# Patient Record
Sex: FEMALE | Race: WHITE | Employment: FULL TIME | URBAN - METROPOLITAN AREA
[De-identification: names, ages, dates, MRNs, and addresses within clinical notes are randomized per-mention and may not be internally consistent; named-entity substitution may affect disease eponyms.]

---

## 2017-01-03 RX ORDER — METOPROLOL SUCCINATE 25 MG/1
TABLET, EXTENDED RELEASE ORAL
Qty: 90 TABLET | Refills: 3 | Status: SHIPPED | OUTPATIENT
Start: 2017-01-03 | End: 2017-12-29 | Stop reason: SDUPTHER

## 2017-01-26 ENCOUNTER — ANTI-COAG VISIT (OUTPATIENT)
Dept: PHARMACY | Facility: CLINIC | Age: 57
End: 2017-01-26

## 2017-01-26 LAB — INR BLD: 3

## 2017-02-10 ENCOUNTER — OFFICE VISIT (OUTPATIENT)
Dept: FAMILY MEDICINE CLINIC | Age: 57
End: 2017-02-10

## 2017-02-10 VITALS
SYSTOLIC BLOOD PRESSURE: 118 MMHG | BODY MASS INDEX: 34.15 KG/M2 | TEMPERATURE: 98.3 F | WEIGHT: 200 LBS | DIASTOLIC BLOOD PRESSURE: 60 MMHG | HEIGHT: 64 IN | HEART RATE: 62 BPM | RESPIRATION RATE: 16 BRPM

## 2017-02-10 DIAGNOSIS — J06.9 VIRAL UPPER RESPIRATORY TRACT INFECTION: ICD-10-CM

## 2017-02-10 DIAGNOSIS — H10.31 ACUTE BACTERIAL CONJUNCTIVITIS OF RIGHT EYE: Primary | ICD-10-CM

## 2017-02-10 PROCEDURE — 99214 OFFICE O/P EST MOD 30 MIN: CPT | Performed by: FAMILY MEDICINE

## 2017-02-10 RX ORDER — SULFACETAMIDE SODIUM 100 MG/ML
1 SOLUTION/ DROPS OPHTHALMIC 4 TIMES DAILY
Qty: 1 BOTTLE | Refills: 0 | Status: SHIPPED | OUTPATIENT
Start: 2017-02-10 | End: 2017-02-15

## 2017-02-23 ENCOUNTER — ANTI-COAG VISIT (OUTPATIENT)
Dept: PHARMACY | Facility: CLINIC | Age: 57
End: 2017-02-23

## 2017-02-23 LAB — INR BLD: 3.6

## 2017-03-02 ENCOUNTER — OFFICE VISIT (OUTPATIENT)
Dept: FAMILY MEDICINE CLINIC | Age: 57
End: 2017-03-02

## 2017-03-02 VITALS
WEIGHT: 202 LBS | BODY MASS INDEX: 34.49 KG/M2 | HEART RATE: 72 BPM | HEIGHT: 64 IN | DIASTOLIC BLOOD PRESSURE: 70 MMHG | TEMPERATURE: 98.1 F | RESPIRATION RATE: 18 BRPM | SYSTOLIC BLOOD PRESSURE: 120 MMHG

## 2017-03-02 DIAGNOSIS — E78.5 HYPERLIPIDEMIA WITH TARGET LDL LESS THAN 130: Primary | ICD-10-CM

## 2017-03-02 DIAGNOSIS — Z79.01 CHRONIC ANTICOAGULATION: ICD-10-CM

## 2017-03-02 DIAGNOSIS — R73.9 HYPERGLYCEMIA: ICD-10-CM

## 2017-03-02 DIAGNOSIS — Z95.2 S/P AVR (AORTIC VALVE REPLACEMENT): ICD-10-CM

## 2017-03-02 PROCEDURE — 99214 OFFICE O/P EST MOD 30 MIN: CPT | Performed by: FAMILY MEDICINE

## 2017-03-16 DIAGNOSIS — E78.5 HYPERLIPIDEMIA WITH TARGET LDL LESS THAN 130: ICD-10-CM

## 2017-03-16 DIAGNOSIS — Z79.01 CHRONIC ANTICOAGULATION: ICD-10-CM

## 2017-03-16 DIAGNOSIS — R73.9 HYPERGLYCEMIA: ICD-10-CM

## 2017-03-16 LAB
A/G RATIO: 2.1 (ref 1.1–2.2)
ALBUMIN SERPL-MCNC: 4.2 G/DL (ref 3.4–5)
ALP BLD-CCNC: 66 U/L (ref 40–129)
ALT SERPL-CCNC: 15 U/L (ref 10–40)
ANION GAP SERPL CALCULATED.3IONS-SCNC: 14 MMOL/L (ref 3–16)
AST SERPL-CCNC: 17 U/L (ref 15–37)
BASOPHILS ABSOLUTE: 0.1 K/UL (ref 0–0.2)
BASOPHILS RELATIVE PERCENT: 1.4 %
BILIRUB SERPL-MCNC: 1.2 MG/DL (ref 0–1)
BUN BLDV-MCNC: 13 MG/DL (ref 7–20)
CALCIUM SERPL-MCNC: 9.3 MG/DL (ref 8.3–10.6)
CHLORIDE BLD-SCNC: 105 MMOL/L (ref 99–110)
CHOLESTEROL, TOTAL: 138 MG/DL (ref 0–199)
CO2: 27 MMOL/L (ref 21–32)
CREAT SERPL-MCNC: 0.8 MG/DL (ref 0.6–1.1)
EOSINOPHILS ABSOLUTE: 0.9 K/UL (ref 0–0.6)
EOSINOPHILS RELATIVE PERCENT: 11.2 %
ESTIMATED AVERAGE GLUCOSE: 105.4 MG/DL
GFR AFRICAN AMERICAN: >60
GFR NON-AFRICAN AMERICAN: >60
GLOBULIN: 2 G/DL
GLUCOSE BLD-MCNC: 86 MG/DL (ref 70–99)
HBA1C MFR BLD: 5.3 %
HCT VFR BLD CALC: 45.7 % (ref 36–48)
HDLC SERPL-MCNC: 50 MG/DL (ref 40–60)
HEMOGLOBIN: 14.8 G/DL (ref 12–16)
LDL CHOLESTEROL CALCULATED: 59 MG/DL
LYMPHOCYTES ABSOLUTE: 2 K/UL (ref 1–5.1)
LYMPHOCYTES RELATIVE PERCENT: 25.7 %
MCH RBC QN AUTO: 28.4 PG (ref 26–34)
MCHC RBC AUTO-ENTMCNC: 32.3 G/DL (ref 31–36)
MCV RBC AUTO: 87.9 FL (ref 80–100)
MONOCYTES ABSOLUTE: 0.5 K/UL (ref 0–1.3)
MONOCYTES RELATIVE PERCENT: 6.5 %
NEUTROPHILS ABSOLUTE: 4.4 K/UL (ref 1.7–7.7)
NEUTROPHILS RELATIVE PERCENT: 55.2 %
PDW BLD-RTO: 14.5 % (ref 12.4–15.4)
PLATELET # BLD: 206 K/UL (ref 135–450)
PMV BLD AUTO: 10.3 FL (ref 5–10.5)
POTASSIUM SERPL-SCNC: 4.5 MMOL/L (ref 3.5–5.1)
RBC # BLD: 5.2 M/UL (ref 4–5.2)
SODIUM BLD-SCNC: 146 MMOL/L (ref 136–145)
TOTAL PROTEIN: 6.2 G/DL (ref 6.4–8.2)
TRIGL SERPL-MCNC: 145 MG/DL (ref 0–150)
VLDLC SERPL CALC-MCNC: 29 MG/DL
WBC # BLD: 7.9 K/UL (ref 4–11)

## 2017-03-17 RX ORDER — WARFARIN SODIUM 5 MG/1
TABLET ORAL
Qty: 30 TABLET | Refills: 6 | Status: SHIPPED | OUTPATIENT
Start: 2017-03-17 | End: 2017-06-22 | Stop reason: SDUPTHER

## 2017-03-29 ENCOUNTER — ANTI-COAG VISIT (OUTPATIENT)
Dept: PHARMACY | Facility: CLINIC | Age: 57
End: 2017-03-29

## 2017-03-29 LAB — INR BLD: 3.2

## 2017-04-28 ENCOUNTER — ANTI-COAG VISIT (OUTPATIENT)
Dept: PHARMACY | Facility: CLINIC | Age: 57
End: 2017-04-28

## 2017-04-28 LAB — INR BLD: 2.7

## 2017-05-25 ENCOUNTER — ANTI-COAG VISIT (OUTPATIENT)
Dept: PHARMACY | Facility: CLINIC | Age: 57
End: 2017-05-25

## 2017-05-25 LAB — INR BLD: 3.3

## 2017-06-12 ENCOUNTER — OFFICE VISIT (OUTPATIENT)
Dept: CARDIOLOGY CLINIC | Age: 57
End: 2017-06-12

## 2017-06-12 VITALS
HEIGHT: 64 IN | SYSTOLIC BLOOD PRESSURE: 122 MMHG | OXYGEN SATURATION: 96 % | WEIGHT: 210 LBS | DIASTOLIC BLOOD PRESSURE: 78 MMHG | HEART RATE: 76 BPM | BODY MASS INDEX: 35.85 KG/M2

## 2017-06-12 DIAGNOSIS — Z98.890 S/P AORTIC ANEURYSM REPAIR: ICD-10-CM

## 2017-06-12 DIAGNOSIS — E78.2 MIXED HYPERLIPIDEMIA: ICD-10-CM

## 2017-06-12 DIAGNOSIS — Z95.2 S/P AVR (AORTIC VALVE REPLACEMENT): ICD-10-CM

## 2017-06-12 DIAGNOSIS — Z86.79 S/P AORTIC ANEURYSM REPAIR: ICD-10-CM

## 2017-06-12 DIAGNOSIS — Q23.1 BICUSPID AORTIC VALVE: ICD-10-CM

## 2017-06-12 DIAGNOSIS — I35.0 NONRHEUMATIC AORTIC VALVE STENOSIS: Primary | ICD-10-CM

## 2017-06-12 DIAGNOSIS — I10 BENIGN ESSENTIAL HTN: ICD-10-CM

## 2017-06-12 DIAGNOSIS — I71.21 ASCENDING AORTIC ANEURYSM: ICD-10-CM

## 2017-06-12 PROCEDURE — 99214 OFFICE O/P EST MOD 30 MIN: CPT | Performed by: INTERNAL MEDICINE

## 2017-06-12 PROCEDURE — 93000 ELECTROCARDIOGRAM COMPLETE: CPT | Performed by: INTERNAL MEDICINE

## 2017-06-16 ENCOUNTER — HOSPITAL ENCOUNTER (OUTPATIENT)
Dept: CT IMAGING | Age: 57
Discharge: HOME | End: 2017-06-16
Attending: INTERNAL MEDICINE | Admitting: INTERNAL MEDICINE

## 2017-06-16 DIAGNOSIS — I71.21 ASCENDING AORTIC ANEURYSM: ICD-10-CM

## 2017-06-16 DIAGNOSIS — Z95.2 S/P AVR (AORTIC VALVE REPLACEMENT): ICD-10-CM

## 2017-06-16 DIAGNOSIS — Z98.890 OTHER SPECIFIED POSTPROCEDURAL STATES: ICD-10-CM

## 2017-06-16 DIAGNOSIS — Z98.890 S/P AORTIC ANEURYSM REPAIR: ICD-10-CM

## 2017-06-16 DIAGNOSIS — Z86.79 S/P AORTIC ANEURYSM REPAIR: ICD-10-CM

## 2017-06-22 ENCOUNTER — ANTI-COAG VISIT (OUTPATIENT)
Dept: PHARMACY | Facility: CLINIC | Age: 57
End: 2017-06-22

## 2017-06-22 LAB — INR BLD: 3.1

## 2017-06-27 ENCOUNTER — TELEPHONE (OUTPATIENT)
Dept: FAMILY MEDICINE CLINIC | Age: 57
End: 2017-06-27

## 2017-07-19 ENCOUNTER — ANTI-COAG VISIT (OUTPATIENT)
Dept: PHARMACY | Facility: CLINIC | Age: 57
End: 2017-07-19

## 2017-07-19 LAB
INR BLD: 3
PROTIME: 36.5 SECONDS

## 2017-07-28 RX ORDER — ATORVASTATIN CALCIUM 40 MG/1
TABLET, FILM COATED ORAL
Qty: 90 TABLET | Refills: 3 | Status: SHIPPED | OUTPATIENT
Start: 2017-07-28 | End: 2018-07-26 | Stop reason: SDUPTHER

## 2017-08-18 ENCOUNTER — ANTI-COAG VISIT (OUTPATIENT)
Dept: PHARMACY | Facility: CLINIC | Age: 57
End: 2017-08-18

## 2017-08-18 LAB — INR BLD: 3.4

## 2017-09-07 ENCOUNTER — OFFICE VISIT (OUTPATIENT)
Dept: FAMILY MEDICINE CLINIC | Age: 57
End: 2017-09-07

## 2017-09-07 VITALS
BODY MASS INDEX: 36.37 KG/M2 | SYSTOLIC BLOOD PRESSURE: 122 MMHG | DIASTOLIC BLOOD PRESSURE: 70 MMHG | HEART RATE: 70 BPM | RESPIRATION RATE: 16 BRPM | WEIGHT: 213 LBS | TEMPERATURE: 97.9 F | HEIGHT: 64 IN

## 2017-09-07 DIAGNOSIS — R40.0 DAYTIME SLEEPINESS: ICD-10-CM

## 2017-09-07 DIAGNOSIS — R53.83 MALAISE AND FATIGUE: ICD-10-CM

## 2017-09-07 DIAGNOSIS — Z95.2 S/P AVR (AORTIC VALVE REPLACEMENT): ICD-10-CM

## 2017-09-07 DIAGNOSIS — Z00.00 WELL ADULT HEALTH CHECK: Primary | ICD-10-CM

## 2017-09-07 DIAGNOSIS — E78.5 HYPERLIPIDEMIA WITH TARGET LDL LESS THAN 130: ICD-10-CM

## 2017-09-07 DIAGNOSIS — Z79.01 CHRONIC ANTICOAGULATION: ICD-10-CM

## 2017-09-07 DIAGNOSIS — Z23 NEED FOR ZOSTER VACCINATION: ICD-10-CM

## 2017-09-07 DIAGNOSIS — J45.20 MILD INTERMITTENT ASTHMA WITHOUT COMPLICATION: ICD-10-CM

## 2017-09-07 DIAGNOSIS — R06.09 DYSPNEA ON EXERTION: ICD-10-CM

## 2017-09-07 DIAGNOSIS — R73.9 HYPERGLYCEMIA: ICD-10-CM

## 2017-09-07 DIAGNOSIS — R06.83 SNORES: ICD-10-CM

## 2017-09-07 DIAGNOSIS — R53.81 MALAISE AND FATIGUE: ICD-10-CM

## 2017-09-07 DIAGNOSIS — I69.30 HISTORY OF CVA WITH RESIDUAL DEFICIT: ICD-10-CM

## 2017-09-07 DIAGNOSIS — K21.9 GASTROESOPHAGEAL REFLUX DISEASE WITHOUT ESOPHAGITIS: ICD-10-CM

## 2017-09-07 LAB
ANION GAP SERPL CALCULATED.3IONS-SCNC: 12 MMOL/L (ref 3–16)
BASOPHILS ABSOLUTE: 0.1 K/UL (ref 0–0.2)
BASOPHILS RELATIVE PERCENT: 1.8 %
BUN BLDV-MCNC: 15 MG/DL (ref 7–20)
CALCIUM SERPL-MCNC: 8.8 MG/DL (ref 8.3–10.6)
CHLORIDE BLD-SCNC: 107 MMOL/L (ref 99–110)
CO2: 27 MMOL/L (ref 21–32)
CREAT SERPL-MCNC: 0.8 MG/DL (ref 0.6–1.1)
EOSINOPHILS ABSOLUTE: 0.8 K/UL (ref 0–0.6)
EOSINOPHILS RELATIVE PERCENT: 12.6 %
ESTIMATED AVERAGE GLUCOSE: 119.8 MG/DL
GFR AFRICAN AMERICAN: >60
GFR NON-AFRICAN AMERICAN: >60
GLUCOSE BLD-MCNC: 89 MG/DL (ref 70–99)
HBA1C MFR BLD: 5.8 %
HCT VFR BLD CALC: 43.3 % (ref 36–48)
HEMOGLOBIN: 14.2 G/DL (ref 12–16)
LYMPHOCYTES ABSOLUTE: 1.6 K/UL (ref 1–5.1)
LYMPHOCYTES RELATIVE PERCENT: 23.7 %
MCH RBC QN AUTO: 28.8 PG (ref 26–34)
MCHC RBC AUTO-ENTMCNC: 32.8 G/DL (ref 31–36)
MCV RBC AUTO: 87.6 FL (ref 80–100)
MONOCYTES ABSOLUTE: 0.5 K/UL (ref 0–1.3)
MONOCYTES RELATIVE PERCENT: 7 %
NEUTROPHILS ABSOLUTE: 3.6 K/UL (ref 1.7–7.7)
NEUTROPHILS RELATIVE PERCENT: 54.9 %
PDW BLD-RTO: 14.4 % (ref 12.4–15.4)
PLATELET # BLD: 191 K/UL (ref 135–450)
PMV BLD AUTO: 10.2 FL (ref 5–10.5)
POTASSIUM SERPL-SCNC: 4 MMOL/L (ref 3.5–5.1)
RBC # BLD: 4.94 M/UL (ref 4–5.2)
SODIUM BLD-SCNC: 146 MMOL/L (ref 136–145)
TSH SERPL DL<=0.05 MIU/L-ACNC: 2.5 UIU/ML (ref 0.27–4.2)
WBC # BLD: 6.6 K/UL (ref 4–11)

## 2017-09-07 PROCEDURE — 90736 HZV VACCINE LIVE SUBQ: CPT | Performed by: FAMILY MEDICINE

## 2017-09-07 PROCEDURE — 90471 IMMUNIZATION ADMIN: CPT | Performed by: FAMILY MEDICINE

## 2017-09-07 PROCEDURE — 99396 PREV VISIT EST AGE 40-64: CPT | Performed by: FAMILY MEDICINE

## 2017-09-07 RX ORDER — CETIRIZINE HYDROCHLORIDE 10 MG/1
10 TABLET ORAL PRN
COMMUNITY

## 2017-09-07 ASSESSMENT — PATIENT HEALTH QUESTIONNAIRE - PHQ9
2. FEELING DOWN, DEPRESSED OR HOPELESS: 0
SUM OF ALL RESPONSES TO PHQ9 QUESTIONS 1 & 2: 0
1. LITTLE INTEREST OR PLEASURE IN DOING THINGS: 0
SUM OF ALL RESPONSES TO PHQ QUESTIONS 1-9: 0

## 2017-09-15 ENCOUNTER — ANTI-COAG VISIT (OUTPATIENT)
Dept: PHARMACY | Facility: CLINIC | Age: 57
End: 2017-09-15

## 2017-09-15 LAB — INR BLD: 2.9

## 2017-09-28 ENCOUNTER — HOSPITAL ENCOUNTER (OUTPATIENT)
Dept: VASCULAR LAB | Age: 57
Discharge: HOME | End: 2017-09-28
Attending: SURGERY | Admitting: SURGERY

## 2017-10-04 ENCOUNTER — OFFICE VISIT (OUTPATIENT)
Dept: FAMILY MEDICINE CLINIC | Age: 57
End: 2017-10-04

## 2017-10-04 VITALS
HEIGHT: 64 IN | BODY MASS INDEX: 36.19 KG/M2 | DIASTOLIC BLOOD PRESSURE: 66 MMHG | WEIGHT: 212 LBS | SYSTOLIC BLOOD PRESSURE: 116 MMHG | RESPIRATION RATE: 18 BRPM | TEMPERATURE: 98 F | HEART RATE: 67 BPM

## 2017-10-04 DIAGNOSIS — M77.51 TENDONITIS OF ANKLE, RIGHT: Primary | ICD-10-CM

## 2017-10-04 DIAGNOSIS — Z23 NEEDS FLU SHOT: ICD-10-CM

## 2017-10-04 PROCEDURE — 90630 INFLUENZA, QUADV, 18-64 YRS, ID, PF, MICRO INJ, 0.1ML (FLUZONE QUADV, PF): CPT | Performed by: FAMILY MEDICINE

## 2017-10-04 PROCEDURE — 90471 IMMUNIZATION ADMIN: CPT | Performed by: FAMILY MEDICINE

## 2017-10-04 PROCEDURE — 99213 OFFICE O/P EST LOW 20 MIN: CPT | Performed by: FAMILY MEDICINE

## 2017-10-04 NOTE — PATIENT INSTRUCTIONS
facing a doorway. Tie a loop in one end of the tubing. Put your foot through the loop so that the tubing goes around the arch of your foot. Tie a knot in the other end of the tubing and shut the knot in the door. Move backward until there is tension in the tubing. Keeping your knee straight, pull your foot toward your body, stretching the tubing. Slowly return to the starting position. Do 3 sets of 10. Resisted plantar flexion: Sit with your leg outstretched and loop the middle section of the tubing around the ball of your foot. Hold the ends of the tubing in both hands. Gently press the ball of your foot down and point your toes, stretching the tubing. Return to the starting position. Do 3 sets of 10. Resisted inversion: Sit with your legs out straight and cross your uninjured leg over your injured ankle. Wrap the tubing around the ball of your injured foot and then loop it around your uninjured foot so that the tubing is anchored there at one end. Hold the other end of the tubing in your hand. Turn your injured foot inward and upward. This will stretch the tubing. Return to the starting position. Do 3 sets of 10. Resisted eversion: Sit with both legs stretched out in front of you, with your feet about a shoulder's width apart. Tie a loop in one end of the tubing. Put your injured foot through the loop so that the tubing goes around the arch of that foot and wraps around the outside of the uninjured foot. Hold onto the other end of the tubing with your hand to provide tension. Turn your injured foot up and out. Make sure you keep your uninjured foot still so that it will allow the tubing to stretch as you move your injured foot. Return to the starting position. Do 3 sets of 10. You may do the rest of the exercises when you can stand on your injured ankle without pain. Heel raises: Balance yourself while standing behind a chair or counter.  Raise your body up onto your toes and hold it for 5 seconds, then

## 2017-10-04 NOTE — PROGRESS NOTES
CHART REVIEW  Health Maintenance   Topic Date Due    Flu vaccine (1) 09/01/2017    Breast cancer screen  10/20/2018    Cervical cancer screen  11/20/2018    DTaP/Tdap/Td vaccine (2 - Td) 09/03/2019    Colon cancer screen colonoscopy  10/21/2021    Lipid screen  03/16/2022    Pneumococcal med risk  Completed    Hepatitis C screen  Completed    HIV screen  Completed      Patient Active Problem List   Diagnosis    History of CVA with residual deficit- 2010, vision change    Screening for malignant neoplasm of cervix    Postmenopausal status    Paralytic strabismus, third or oculomotor nerve palsy, total    Mild intermittent asthma without complication    Hyperlipidemia with target LDL less than 130    GERD (gastroesophageal reflux disease)    Allergic rhinitis    Screening mammogram, encounter for    Screen for colon cancer    Well adult health check    S/P AVR (aortic valve replacement) 2/16    Chronic anticoagulation for AVR- INR 2.5-3.5    Hyperglycemia    Family history of colon cancer in mother    Daytime sleepiness    Snores     Cholesterol, Total   Date Value Ref Range Status   03/16/2017 138 0 - 199 mg/dL Final     LDL Calculated   Date Value Ref Range Status   03/16/2017 59 <100 mg/dL Final     HDL   Date Value Ref Range Status   03/16/2017 50 40 - 60 mg/dL Final     Triglycerides   Date Value Ref Range Status   03/16/2017 145 0 - 150 mg/dL Final     Lab Results   Component Value Date    GLUCOSE 89 09/07/2017     Lab Results   Component Value Date     (H) 09/07/2017    K 4.0 09/07/2017    CREATININE 0.8 09/07/2017     Lab Results   Component Value Date    WBC 6.6 09/07/2017    HGB 14.2 09/07/2017    HCT 43.3 09/07/2017    MCV 87.6 09/07/2017     09/07/2017     Lab Results   Component Value Date    ALT 15 03/16/2017    AST 17 03/16/2017    ALKPHOS 66 03/16/2017    BILITOT 1.2 (H) 03/16/2017     TSH (uIU/mL)   Date Value   09/07/2017 2.50     Lab Results   Component Value Date    LABA1C 5.8 09/07/2017     The 10-year ASCVD risk score Milton Estrada DC, [facility] 2013) is: 1.9%    Values used to calculate the score:      Age: 59 years      Sex: Female      Is Non- : No      Diabetic: No      Tobacco smoker: No      Systolic Blood Pressure: 956 mmHg      Is BP treated: No      HDL Cholesterol: 50 mg/dL      Total Cholesterol: 138 mg/dL    VISIT NOTE   Subjective:     Chief Complaint   Patient presents with    Ankle Pain     R ankle pain x1 week     Hemant Jeffrey is a 59 y.o. female who presents  R ankle lateral increasing pain x 1 week, had to walk on toes so calf sore now  Associated with minimal swell, limping  Denies injury, bruise  Worse with 1st thing in AM  Better with as day goes on  Tried tylenol    Patient's medications, allergies, past medical, surgical, social and family histories were reviewed and updated as appropriate (see above)    Review of Systems  As above     Objective:    PHYSICAL EXAM   /66 (Site: Right Arm, Position: Sitting, Cuff Size: Large Adult)  Pulse 67  Temp 98 °F (36.7 °C) (Oral)   Resp 18  Ht 5' 4\" (1.626 m)  Wt 212 lb (96.2 kg)  LMP 02/09/2006  BMI 36.39 kg/m2  Blood pressure is Excellent. BP Readings from Last 5 Encounters:   10/04/17 116/66   09/07/17 122/70   06/12/17 122/78   03/02/17 120/70   02/10/17 118/60     Weight is unchanged. Wt Readings from Last 5 Encounters:   10/04/17 212 lb (96.2 kg)   09/07/17 213 lb (96.6 kg)   06/12/17 210 lb (95.3 kg)   03/02/17 202 lb (91.6 kg)   02/10/17 200 lb (90.7 kg)      GENERAL:   · well-developed, well-nourished, alert, no distress. MUSCULOSKEL:    · Gait normal, assistive device: none  · Normal ROM, non-tender to palpation, strength normal, no instability noted in right ankle     Assessment and Plan:     1. Tendonitis of ankle, right     2.  Needs flu shot  INFLUENZA, QUADV, 18-64 YRS, ID, PF, MICRO INJ, 0.1ML (FLUZONE QUADV, PF)     Use heat 20 minutes on painful

## 2017-10-04 NOTE — MR AVS SNAPSHOT
Standing soleus stretch: Stand facing a wall with your hands at about chest level. With both knees slightly bent and the injured foot back, gently lean into the wall until you feel a stretch in your lower calf. Once again, angle the toes of your injured foot slightly inward and keep your heel down on the floor. Hold this for 15 to 30 seconds. Return to the starting position. Repeat 3 times. You can do the next 5 exercises when your ankle swelling has stopped increasing. Ankle range of motion: Sitting or lying down with your legs straight and your knee toward the ceiling, move your ankle up and down, in and out, and in circles. Only move your ankle. Don't move your leg. Repeat 10 times in each direction. Push hard in all directions. Resisted dorsiflexion: Sit with your injured leg out straight and your foot facing a doorway. Tie a loop in one end of the tubing. Put your foot through the loop so that the tubing goes around the arch of your foot. Tie a knot in the other end of the tubing and shut the knot in the door. Move backward until there is tension in the tubing. Keeping your knee straight, pull your foot toward your body, stretching the tubing. Slowly return to the starting position. Do 3 sets of 10. Resisted plantar flexion: Sit with your leg outstretched and loop the middle section of the tubing around the ball of your foot. Hold the ends of the tubing in both hands. Gently press the ball of your foot down and point your toes, stretching the tubing. Return to the starting position. Do 3 sets of 10. Resisted inversion: Sit with your legs out straight and cross your uninjured leg over your injured ankle. Wrap the tubing around the ball of your injured foot and then loop it around your uninjured foot so that the tubing is anchored there at one end. Hold the other end of the tubing in your hand. Turn your injured foot inward and upward.  This will stretch the 7. Enter your Password Reset Question and Answer. This can be used at a later time if you forget your password. 8. Enter your e-mail address. You will receive e-mail notification when new information is available in [address] 9. Click Sign Up. You can now view your medical record. Additional Information  If you have questions, please contact the physician practice where you receive care. Remember, [facility] [employer] [facility] is NOT to be used for urgent needs. For medical emergencies, dial 911. For questions regarding your [employer] account call [phone]. If you have a clinical question, please call your doctor's office.

## 2017-10-13 ENCOUNTER — ANTI-COAG VISIT (OUTPATIENT)
Dept: PHARMACY | Facility: CLINIC | Age: 57
End: 2017-10-13

## 2017-10-13 LAB — INR BLD: 2.9

## 2017-11-01 ENCOUNTER — HOSPITAL ENCOUNTER (OUTPATIENT)
Dept: OTHER | Age: 57
Discharge: HOME | End: 2017-11-30
Attending: INTERNAL MEDICINE | Admitting: INTERNAL MEDICINE

## 2017-11-10 ENCOUNTER — ANTI-COAG VISIT (OUTPATIENT)
Dept: PHARMACY | Facility: CLINIC | Age: 57
End: 2017-11-10

## 2017-11-10 LAB — INR BLD: 3.5

## 2017-12-01 ENCOUNTER — HOSPITAL ENCOUNTER (OUTPATIENT)
Dept: OTHER | Age: 57
Discharge: HOME | End: 2017-12-31
Attending: INTERNAL MEDICINE | Admitting: INTERNAL MEDICINE

## 2017-12-08 ENCOUNTER — ANTI-COAG VISIT (OUTPATIENT)
Dept: PHARMACY | Facility: CLINIC | Age: 57
End: 2017-12-08

## 2017-12-08 LAB — INTERNATIONAL NORMALIZATION RATIO, POC: 3.6

## 2017-12-08 NOTE — PROGRESS NOTES
Ms. Taryn Lozano is a 59 y.o.  female with history of Heart Valve Replacement who presents today for anticoagulation monitoring and adjustment. Patient verifies current dosing regimen. Patient denies s/s bleeding/bruising/swelling/SOB. No blood in urine or stool. No dietary changes. No changes in medication/OTC agents/Herbals. No change in alcohol use. No missed doses. No Procedures scheduled in the future at this time. Lab Results   Component Value Date    INR 3.6 12/08/2017    INR 3.5 11/10/2017    INR 2.9 10/13/2017     Coumadin dose: Continue Warfarin 5mg daily EXCEPT 2.5mg every Monday, Wednesday, and Friday. Recheck INR in 4 weeks. Patient reminded to call the Anticoagulation Clinic with any signs or symptoms of bleeding or with any medication changes. Patient given instructions utilizing the teach back method. After visit summary printed and reviewed with patient.       Medications reviewed and updated on home medication list Yes  Warfarin dose updated on patient home medication list  Yes

## 2017-12-12 ENCOUNTER — OFFICE VISIT (OUTPATIENT)
Dept: CARDIOLOGY CLINIC | Age: 57
End: 2017-12-12

## 2017-12-12 VITALS
BODY MASS INDEX: 36.02 KG/M2 | WEIGHT: 211 LBS | HEIGHT: 64 IN | HEART RATE: 84 BPM | DIASTOLIC BLOOD PRESSURE: 64 MMHG | SYSTOLIC BLOOD PRESSURE: 118 MMHG | OXYGEN SATURATION: 98 %

## 2017-12-12 DIAGNOSIS — E78.2 MIXED HYPERLIPIDEMIA: ICD-10-CM

## 2017-12-12 DIAGNOSIS — Z79.01 CHRONIC ANTICOAGULATION: ICD-10-CM

## 2017-12-12 DIAGNOSIS — Z95.2 S/P AVR (AORTIC VALVE REPLACEMENT): ICD-10-CM

## 2017-12-12 DIAGNOSIS — I71.21 ASCENDING AORTIC ANEURYSM: ICD-10-CM

## 2017-12-12 DIAGNOSIS — I35.0 NONRHEUMATIC AORTIC VALVE STENOSIS: Primary | ICD-10-CM

## 2017-12-12 DIAGNOSIS — I10 ESSENTIAL HYPERTENSION: ICD-10-CM

## 2017-12-12 PROCEDURE — 99214 OFFICE O/P EST MOD 30 MIN: CPT | Performed by: INTERNAL MEDICINE

## 2017-12-12 NOTE — PROGRESS NOTES
current exam.  Mild adjacent soft tissue   thickening is likely postoperative.       Moderate hiatal hernia.       2.1 cm splenic artery aneurysm.       Emphysema.  A 2 mm lingular pulmonary nodule is new as compared to prior, for   which follow-up recommendations are as below. 2/2016 Angiogram  1. Normal right dominant coronary arteries with no evidence of atherosclerotic disease. 2. Normal left ventricular systolic function with an elevated ejection fraction of 60%. 3. Severe aortic stenosis with a mean gradient of 50.9 mmHg. The valve is heavily calcified. Event monitor 5/2016  No sustained arrhythmia. 12 beat run of PSVT. Assessment/Plan:     1) Aortic stenosis secondary to bicuspid valve s/p AVR 2/16/16. Echocardiogram EF 60%. Mechanical valve well seated. Will periodically repeat an echo (last one was 4/2016). She plans to call before the next appt to have the repeat ECHO scheduled. Continue warfarin and ASA. Warfarin managed by the coumadin clinic. 2) Ascending aortic aneurysm s/p Bentall procedure. Repeat CT chest (6/2017) shows normal caliber aorta. 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 Currently denies any new neurologic symptoms. 4) Essential hypertension. Goal BP <120/80 with aneurysm. Continue medical therapy. 5) Hyperlipidemia. Continue high intensity statin. 6) COPD. Chest CT shows emphysema. She denies dyspnea. Follow up in 1 year. Thank you very much for allowing me to participate in the care of your patient. Please do not hesitate to contact me if you have any questions. Sincerely,  Muriel Almeida.  [employer], [address] [place] [address], [employer] [address]  Ph: [phone]  Fax: [phone]

## 2017-12-29 RX ORDER — METOPROLOL SUCCINATE 25 MG/1
TABLET, EXTENDED RELEASE ORAL
Qty: 90 TABLET | Refills: 3 | Status: SHIPPED | OUTPATIENT
Start: 2017-12-29 | End: 2018-12-24 | Stop reason: SDUPTHER

## 2018-01-01 ENCOUNTER — HOSPITAL ENCOUNTER (OUTPATIENT)
Dept: OTHER | Age: 58
Discharge: HOME | End: 2018-01-31
Attending: INTERNAL MEDICINE | Admitting: INTERNAL MEDICINE

## 2018-01-04 ENCOUNTER — ANTI-COAG VISIT (OUTPATIENT)
Dept: PHARMACY | Facility: CLINIC | Age: 58
End: 2018-01-04

## 2018-01-04 LAB — INTERNATIONAL NORMALIZATION RATIO, POC: 3

## 2018-02-01 ENCOUNTER — ANTI-COAG VISIT (OUTPATIENT)
Dept: PHARMACY | Facility: CLINIC | Age: 58
End: 2018-02-01

## 2018-02-01 ENCOUNTER — HOSPITAL ENCOUNTER (OUTPATIENT)
Dept: OTHER | Age: 58
Discharge: HOME | End: 2018-02-28
Attending: INTERNAL MEDICINE | Admitting: INTERNAL MEDICINE

## 2018-02-01 LAB — INTERNATIONAL NORMALIZATION RATIO, POC: 3.3

## 2018-03-01 ENCOUNTER — HOSPITAL ENCOUNTER (OUTPATIENT)
Dept: OTHER | Age: 58
Discharge: HOME | End: 2018-03-31
Attending: INTERNAL MEDICINE | Admitting: INTERNAL MEDICINE

## 2018-03-02 ENCOUNTER — ANTI-COAG VISIT (OUTPATIENT)
Dept: PHARMACY | Facility: CLINIC | Age: 58
End: 2018-03-02

## 2018-03-02 LAB — INTERNATIONAL NORMALIZATION RATIO, POC: 4.7

## 2018-03-02 NOTE — PROGRESS NOTES
Ms. Clary Melgar is a 59 y.o.  female with history of Heart Valve Replacement who presents today for anticoagulation monitoring and adjustment. Patient verifies current dosing regimen. Patient denies s/s bleeding/bruising/swelling/SOB. No blood in urine or stool. No changes in medication/OTC agents/Herbals. No missed doses. No Procedures scheduled in the future at this time. Lab Results   Component Value Date    INR 4.7 03/02/2018    INR 3.3 02/01/2018    INR 3 01/04/2018       Patient appears well. Jaskaranelidabulmaro Antonieta reports that she went out to dinner last evening and had a drink with alcohol and cranberry juice, which would account for her INR being elevated this morning. I will have her hold her Warfarin 2 days and then resume her normal dose. Patient reminded to call the Anticoagulation Clinic with any medication changes. Patient given instructions utilizing the teach back method. After visit summary printed and reviewed with patient. Warfarin dose updated.

## 2018-03-07 ENCOUNTER — OFFICE VISIT (OUTPATIENT)
Dept: FAMILY MEDICINE CLINIC | Age: 58
End: 2018-03-07

## 2018-03-07 VITALS
HEIGHT: 64 IN | TEMPERATURE: 98.3 F | SYSTOLIC BLOOD PRESSURE: 120 MMHG | DIASTOLIC BLOOD PRESSURE: 80 MMHG | BODY MASS INDEX: 36.02 KG/M2 | RESPIRATION RATE: 16 BRPM | HEART RATE: 62 BPM | WEIGHT: 211 LBS

## 2018-03-07 DIAGNOSIS — R73.9 HYPERGLYCEMIA: ICD-10-CM

## 2018-03-07 DIAGNOSIS — Z95.2 S/P AVR (AORTIC VALVE REPLACEMENT): ICD-10-CM

## 2018-03-07 DIAGNOSIS — E78.5 HYPERLIPIDEMIA WITH TARGET LDL LESS THAN 130: ICD-10-CM

## 2018-03-07 DIAGNOSIS — Z79.01 CHRONIC ANTICOAGULATION: ICD-10-CM

## 2018-03-07 DIAGNOSIS — E78.5 HYPERLIPIDEMIA WITH TARGET LDL LESS THAN 130: Primary | ICD-10-CM

## 2018-03-07 DIAGNOSIS — J45.20 MILD INTERMITTENT ASTHMA WITHOUT COMPLICATION: ICD-10-CM

## 2018-03-07 LAB
A/G RATIO: 2.1 (ref 1.1–2.2)
ALBUMIN SERPL-MCNC: 4.2 G/DL (ref 3.4–5)
ALP BLD-CCNC: 70 U/L (ref 40–129)
ALT SERPL-CCNC: 14 U/L (ref 10–40)
ANION GAP SERPL CALCULATED.3IONS-SCNC: 14 MMOL/L (ref 3–16)
AST SERPL-CCNC: 18 U/L (ref 15–37)
BILIRUB SERPL-MCNC: 1.5 MG/DL (ref 0–1)
BUN BLDV-MCNC: 15 MG/DL (ref 7–20)
CALCIUM SERPL-MCNC: 8.7 MG/DL (ref 8.3–10.6)
CHLORIDE BLD-SCNC: 110 MMOL/L (ref 99–110)
CHOLESTEROL, TOTAL: 129 MG/DL (ref 0–199)
CO2: 26 MMOL/L (ref 21–32)
CREAT SERPL-MCNC: 0.7 MG/DL (ref 0.6–1.1)
ESTIMATED AVERAGE GLUCOSE: 108.3 MG/DL
GFR AFRICAN AMERICAN: >60
GFR NON-AFRICAN AMERICAN: >60
GLOBULIN: 2 G/DL
GLUCOSE BLD-MCNC: 98 MG/DL (ref 70–99)
HBA1C MFR BLD: 5.4 %
HDLC SERPL-MCNC: 53 MG/DL (ref 40–60)
LDL CHOLESTEROL CALCULATED: 54 MG/DL
POTASSIUM SERPL-SCNC: 4.2 MMOL/L (ref 3.5–5.1)
SODIUM BLD-SCNC: 150 MMOL/L (ref 136–145)
TOTAL PROTEIN: 6.2 G/DL (ref 6.4–8.2)
TRIGL SERPL-MCNC: 110 MG/DL (ref 0–150)
VLDLC SERPL CALC-MCNC: 22 MG/DL

## 2018-03-07 PROCEDURE — 99214 OFFICE O/P EST MOD 30 MIN: CPT | Performed by: FAMILY MEDICINE

## 2018-03-07 RX ORDER — ALBUTEROL SULFATE 90 UG/1
2 AEROSOL, METERED RESPIRATORY (INHALATION) EVERY 6 HOURS PRN
Qty: 1 INHALER | Refills: 3 | Status: SHIPPED | OUTPATIENT
Start: 2018-03-07 | End: 2021-11-03 | Stop reason: ALTCHOICE

## 2018-03-07 NOTE — PROGRESS NOTES
CARDIOVASCULAR VISIT NOTE  CHART REVIEW  Health Maintenance   Topic Date Due    Shingles Vaccine (1 of 2 - 2 Dose Series) 06/22/2008    A1C test (Diabetic or Prediabetic)  09/07/2018    Cervical cancer screen  11/20/2018    DTaP/Tdap/Td vaccine (2 - Td) 09/03/2019    Breast cancer screen  10/27/2019    Colon cancer screen colonoscopy  10/21/2021    Lipid screen  03/16/2022    Flu vaccine  Completed    Pneumococcal med risk  Completed    Hepatitis C screen  Completed    HIV screen  Completed      Patient Active Problem List   Diagnosis    History of CVA with residual deficit- 2010, vision change    Screening for malignant neoplasm of cervix    Postmenopausal status    Paralytic strabismus, third or oculomotor nerve palsy, total    Mild intermittent asthma without complication    Hyperlipidemia with target LDL less than 130    GERD (gastroesophageal reflux disease)    Allergic rhinitis    Screening mammogram, encounter for    Screen for colon cancer    Well adult health check    S/P AVR (aortic valve replacement) 2/16    Chronic anticoagulation for AVR- INR 2.5-3.5    Hyperglycemia    Family history of colon cancer in mother    Daytime sleepiness    Snores     Current Outpatient Prescriptions   Medication Sig Dispense Refill    metoprolol succinate (TOPROL XL) 25 MG extended release tablet TAKE 1 TABLET DAILY 90 tablet 3    cetirizine (ZYRTEC ALLERGY) 10 MG tablet Take 10 mg by mouth daily      atorvastatin (LIPITOR) 40 MG tablet TAKE 1 TABLET NIGHTLY 90 tablet 3    warfarin (COUMADIN) 5 MG tablet Take 5 mg by mouth daily EXCEPT 2.5mg every Monday, Wednesday and Friday or as directed by [facility] Coumadin Service [phone]      fluticasone (FLONASE) 50 MCG/ACT nasal spray 2 sprays by Nasal route daily as needed       aspirin 81 MG chewable tablet Take 81 mg by mouth nightly        No current facility-administered medications for this visit.        Cholesterol, Total   Date Value Ref Range Status   2017 138 0 - 199 mg/dL Final     LDL Calculated   Date Value Ref Range Status   2017 59 <100 mg/dL Final     HDL   Date Value Ref Range Status   2017 50 40 - 60 mg/dL Final     Triglycerides   Date Value Ref Range Status   2017 145 0 - 150 mg/dL Final     Lab Results   Component Value Date    GLUCOSE 89 2017     Lab Results   Component Value Date     (H) 2017    K 4.0 2017    CREATININE 0.8 2017     Lab Results   Component Value Date    WBC 6.6 2017    HGB 14.2 2017    HCT 43.3 2017    MCV 87.6 2017     2017     Lab Results   Component Value Date    ALT 15 2017    AST 17 2017    ALKPHOS 66 2017    BILITOT 1.2 (H) 2017     TSH (uIU/mL)   Date Value   2017 2.50     Lab Results   Component Value Date    LABA1C 5.8 2017     The 10-year ASCVD risk score (Humera Serra et al., 2013) is: 2.1%    Values used to calculate the score:      Age: 59 years      Sex: Female      Is Non- : No      Diabetic: No      Tobacco smoker: No      Systolic Blood Pressure: 595 mmHg      Is BP treated: No      HDL Cholesterol: 50 mg/dL      Total Cholesterol: 138 mg/dL     Subjective:   HPI CHRONIC:   Chief Complaint   Patient presents with    Check-Up     6 month check up     Skin Tag     under left armpit     Patient here for follow-up of multiple chronic conditions includin. Hyperlipidemia with target LDL less than 130    2. Hyperglycemia    3. Mild intermittent asthma without complication    4. Chronic anticoagulation for AVR- INR 2.5-3.5    5. S/P AVR (aortic valve replacement)       · Following appropriate diet? Yes  · Exercise: walking intermittently  · Taking medicines daily as directed? Yes  · Any side effects of medications? No    ROS:  General ROS: fever? No,    night sweats? No  Ophthalmic ROS:blurry vision or decreased vision?  No  Endocrine complication  albuterol sulfate HFA (VENTOLIN HFA) 108 (90 Base) MCG/ACT inhaler   4. Chronic anticoagulation for AVR- INR 2.5-3.5     5. S/P AVR (aortic valve replacement) 2/16       RISK FACTORS AND COUNSELLING  · Behavioral Risks- :\"None identified\". Counseling provided on the following healthy behaviors: N/A. INSTRUCTIONS  · NEXT APPOINTMENT: Please schedule annual complete physical (30 minutes) in 6 months. · PLEASE TAKE THIS FORM TO CHECK-OUT WINDOW TO SCHEDULE NEXT VISIT.   · REFILL POLICY:  If not getting refills today, then PLEASE make next appointment on way out today. Will need to see that future appointment scheudled when pharmmcy contacts Dr. Dot Lundy for a refill. · PLEASE GET BLOODWORK DRAWN TODAY ON FIRST FLOOR in 170. Take orders with you. RESULTS- most blood tests back in couple days. We will call you if any problems. If bloodwork good, you will get letter in mail or notified thru [address] (if signed up) within 2 weeks. If you do not, please call office. · Please get flu vaccine when available in fall. Can get either at this office or at stores such as [employer] and [employer].    · Can try tying base of skin tag with dental floss

## 2018-03-29 ENCOUNTER — ANTI-COAG VISIT (OUTPATIENT)
Dept: PHARMACY | Facility: CLINIC | Age: 58
End: 2018-03-29

## 2018-03-29 LAB — INTERNATIONAL NORMALIZATION RATIO, POC: 3.2

## 2018-04-01 ENCOUNTER — HOSPITAL ENCOUNTER (OUTPATIENT)
Dept: OTHER | Age: 58
Discharge: HOME | End: 2018-04-30
Attending: INTERNAL MEDICINE | Admitting: INTERNAL MEDICINE

## 2018-04-12 RX ORDER — WARFARIN SODIUM 5 MG/1
TABLET ORAL
Qty: 90 TABLET | Refills: 6 | Status: SHIPPED | OUTPATIENT
Start: 2018-04-12 | End: 2018-05-25 | Stop reason: DRUGHIGH

## 2018-04-27 ENCOUNTER — ANTI-COAG VISIT (OUTPATIENT)
Dept: PHARMACY | Facility: CLINIC | Age: 58
End: 2018-04-27

## 2018-04-27 LAB — INTERNATIONAL NORMALIZATION RATIO, POC: 3.5

## 2018-05-01 ENCOUNTER — HOSPITAL ENCOUNTER (OUTPATIENT)
Dept: OTHER | Age: 58
Discharge: HOME | End: 2018-05-31
Attending: INTERNAL MEDICINE | Admitting: INTERNAL MEDICINE

## 2018-05-25 ENCOUNTER — ANTI-COAG VISIT (OUTPATIENT)
Dept: PHARMACY | Facility: CLINIC | Age: 58
End: 2018-05-25

## 2018-05-25 LAB — INR BLD: 4

## 2018-05-25 RX ORDER — WARFARIN SODIUM 5 MG/1
2.5 TABLET ORAL DAILY
COMMUNITY
End: 2019-07-15 | Stop reason: SDUPTHER

## 2018-06-01 ENCOUNTER — HOSPITAL ENCOUNTER (OUTPATIENT)
Dept: OTHER | Age: 58
Discharge: HOME | End: 2018-06-30
Attending: INTERNAL MEDICINE | Admitting: INTERNAL MEDICINE

## 2018-06-08 ENCOUNTER — OFFICE VISIT (OUTPATIENT)
Dept: FAMILY MEDICINE CLINIC | Age: 58
End: 2018-06-08

## 2018-06-08 VITALS
WEIGHT: 207 LBS | RESPIRATION RATE: 16 BRPM | BODY MASS INDEX: 35.53 KG/M2 | SYSTOLIC BLOOD PRESSURE: 120 MMHG | DIASTOLIC BLOOD PRESSURE: 70 MMHG | HEART RATE: 62 BPM

## 2018-06-08 DIAGNOSIS — M19.072 ARTHRITIS OF LEFT FOOT: Primary | ICD-10-CM

## 2018-06-08 PROCEDURE — 99213 OFFICE O/P EST LOW 20 MIN: CPT | Performed by: FAMILY MEDICINE

## 2018-06-22 ENCOUNTER — ANTI-COAG VISIT (OUTPATIENT)
Dept: PHARMACY | Facility: CLINIC | Age: 58
End: 2018-06-22

## 2018-06-22 LAB — INTERNATIONAL NORMALIZATION RATIO, POC: 4

## 2018-07-01 ENCOUNTER — HOSPITAL ENCOUNTER (OUTPATIENT)
Dept: OTHER | Age: 58
Discharge: HOME | End: 2018-07-31
Attending: INTERNAL MEDICINE | Admitting: INTERNAL MEDICINE

## 2018-07-03 ENCOUNTER — TELEPHONE (OUTPATIENT)
Dept: PHARMACY | Facility: CLINIC | Age: 58
End: 2018-07-03

## 2018-07-03 NOTE — TELEPHONE ENCOUNTER
Called Ms. Hernandez to let her know it was okay to take the antibiotic - it should not affect her INR.     Meg Lehman, PharmD, [facility]  Anticoagulation Service  [phone]

## 2018-07-20 ENCOUNTER — ANTI-COAG VISIT (OUTPATIENT)
Dept: PHARMACY | Facility: CLINIC | Age: 58
End: 2018-07-20

## 2018-07-20 LAB — INTERNATIONAL NORMALIZATION RATIO, POC: 3.9

## 2018-07-26 RX ORDER — ATORVASTATIN CALCIUM 40 MG/1
TABLET, FILM COATED ORAL
Qty: 90 TABLET | Refills: 3 | Status: SHIPPED | OUTPATIENT
Start: 2018-07-26 | End: 2019-03-08 | Stop reason: SDUPTHER

## 2018-08-01 ENCOUNTER — HOSPITAL ENCOUNTER (OUTPATIENT)
Dept: OTHER | Age: 58
Discharge: HOME | End: 2018-08-31
Attending: INTERNAL MEDICINE | Admitting: INTERNAL MEDICINE

## 2018-08-17 ENCOUNTER — ANTI-COAG VISIT (OUTPATIENT)
Dept: PHARMACY | Facility: CLINIC | Age: 58
End: 2018-08-17

## 2018-08-17 LAB — INTERNATIONAL NORMALIZATION RATIO, POC: 2.6

## 2018-08-31 ENCOUNTER — OFFICE VISIT (OUTPATIENT)
Dept: FAMILY MEDICINE CLINIC | Age: 58
End: 2018-08-31

## 2018-08-31 VITALS
RESPIRATION RATE: 16 BRPM | WEIGHT: 202 LBS | TEMPERATURE: 97.8 F | BODY MASS INDEX: 34.49 KG/M2 | HEART RATE: 62 BPM | DIASTOLIC BLOOD PRESSURE: 78 MMHG | HEIGHT: 64 IN | SYSTOLIC BLOOD PRESSURE: 128 MMHG

## 2018-08-31 DIAGNOSIS — Z95.2 S/P AVR (AORTIC VALVE REPLACEMENT): ICD-10-CM

## 2018-08-31 DIAGNOSIS — J30.9 ALLERGIC RHINITIS, UNSPECIFIED SEASONALITY, UNSPECIFIED TRIGGER: ICD-10-CM

## 2018-08-31 DIAGNOSIS — I69.30 HISTORY OF CVA WITH RESIDUAL DEFICIT: ICD-10-CM

## 2018-08-31 DIAGNOSIS — E78.5 HYPERLIPIDEMIA WITH TARGET LDL LESS THAN 130: ICD-10-CM

## 2018-08-31 DIAGNOSIS — Z00.00 WELL ADULT HEALTH CHECK: Primary | ICD-10-CM

## 2018-08-31 DIAGNOSIS — R73.9 HYPERGLYCEMIA: ICD-10-CM

## 2018-08-31 DIAGNOSIS — Z79.01 CHRONIC ANTICOAGULATION: ICD-10-CM

## 2018-08-31 DIAGNOSIS — Z23 NEED FOR VACCINATION FOR ZOSTER: ICD-10-CM

## 2018-08-31 LAB — HBA1C MFR BLD: 5.6 %

## 2018-08-31 PROCEDURE — 90750 HZV VACC RECOMBINANT IM: CPT | Performed by: FAMILY MEDICINE

## 2018-08-31 PROCEDURE — 90471 IMMUNIZATION ADMIN: CPT | Performed by: FAMILY MEDICINE

## 2018-08-31 PROCEDURE — 83036 HEMOGLOBIN GLYCOSYLATED A1C: CPT | Performed by: FAMILY MEDICINE

## 2018-08-31 PROCEDURE — 99396 PREV VISIT EST AGE 40-64: CPT | Performed by: FAMILY MEDICINE

## 2018-08-31 ASSESSMENT — PATIENT HEALTH QUESTIONNAIRE - PHQ9
SUM OF ALL RESPONSES TO PHQ QUESTIONS 1-9: 0
SUM OF ALL RESPONSES TO PHQ9 QUESTIONS 1 & 2: 0
SUM OF ALL RESPONSES TO PHQ QUESTIONS 1-9: 0
2. FEELING DOWN, DEPRESSED OR HOPELESS: 0
1. LITTLE INTEREST OR PLEASURE IN DOING THINGS: 0

## 2018-08-31 NOTE — PROGRESS NOTES
Take 2.5 mg by mouth daily EXCEPT 5mg every Monday and Friday or as directed by [facility] Coumadin Service [phone] Yes Historical Provider, MD   albuterol sulfate HFA (VENTOLIN HFA) 108 (90 Base) MCG/ACT inhaler Inhale 2 puffs into the lungs every 6 hours as needed for Wheezing Yes Maria E Qureshi MD   metoprolol succinate (TOPROL XL) 25 MG extended release tablet TAKE 1 TABLET DAILY Yes Shilpa Frausto, APRN - CNP   cetirizine (ZYRTEC ALLERGY) 10 MG tablet Take 10 mg by mouth daily Yes Historical Provider, MD   fluticasone (FLONASE) 50 MCG/ACT nasal spray 2 sprays by Nasal route daily as needed  Yes Historical Provider, MD   aspirin 81 MG chewable tablet Take 81 mg by mouth nightly  Yes Historical Provider, MD     Past Surgical History:   Procedure Laterality Date    AORTIC VALVE REPLACEMENT  2/16/16    +asc aortic aneurysm repair - 21mm [address] [place]ato valved conduit Gill Fulling)    ARTERIAL ANEURYSM REPAIR  2001    craniotomy. internal carotid artery. clipped.  WISDOM TOOTH EXTRACTION       Social History     Social History    Marital status:      Spouse name: N/A    Number of children: N/A    Years of education: N/A     Occupational History    Not on file.      Social History Main Topics    Smoking status: Former Smoker     Packs/day: 1.00     Years: 21.00     Quit date: 1/1/1999    Smokeless tobacco: Never Used      Comment: started age 23    Alcohol use 0.6 oz/week     1 Glasses of wine per week      Comment: 1 per month     Drug use: No      Comment: never    Sexual activity: Yes     Partners: Male     Birth control/ protection: Post-menopausal     Other Topics Concern    Not on file     Social History Narrative    Self-breast exams: yes    Exercise: walking 3x/wk    Seatbelt use: Always    Living will: no, copy requested    Sexual activity: single partner, contraception - post menopausal status           Family History   Problem Relation Age of Onset    High Blood Pressure Father     Heart

## 2018-08-31 NOTE — PATIENT INSTRUCTIONS
a few minutes before you go to bed   Untucking the bed covers at the foot of your bed   Activities that might help relieve night leg cramps include:   Flexing your foot up toward your head   Massaging the cramped muscle with your hands or with ice   Walking or jiggling the leg   Taking a hot shower or warm bath

## 2018-09-01 ENCOUNTER — HOSPITAL ENCOUNTER (OUTPATIENT)
Dept: OTHER | Age: 58
Discharge: HOME | End: 2018-09-01
Attending: INTERNAL MEDICINE | Admitting: INTERNAL MEDICINE

## 2018-09-21 ENCOUNTER — ANTI-COAG VISIT (OUTPATIENT)
Dept: PHARMACY | Facility: CLINIC | Age: 58
End: 2018-09-21

## 2018-09-21 LAB — INTERNATIONAL NORMALIZATION RATIO, POC: 2.1

## 2018-10-04 ENCOUNTER — TELEPHONE (OUTPATIENT)
Dept: CARDIOLOGY CLINIC | Age: 58
End: 2018-10-04

## 2018-10-04 DIAGNOSIS — Z95.2 S/P AVR: Primary | ICD-10-CM

## 2018-10-05 ENCOUNTER — ANTI-COAG VISIT (OUTPATIENT)
Dept: PHARMACY | Age: 58
End: 2018-10-05
Payer: COMMERCIAL

## 2018-10-05 LAB — INTERNATIONAL NORMALIZATION RATIO, POC: 3.2

## 2018-10-05 PROCEDURE — 99211 OFF/OP EST MAY X REQ PHY/QHP: CPT

## 2018-10-05 PROCEDURE — 85610 PROTHROMBIN TIME: CPT

## 2018-10-05 NOTE — PROGRESS NOTES
Ms. Miguel Angel Hendrickson is a 59 y.o.  female with history of Heart Valve Replacement who presents today for anticoagulation monitoring and adjustment. Patient verifies current dosing regimen  Patient denies s/s bleeding/bruising/swelling/SOB  No blood in urine or stool. No dietary changes. No changes in medication/OTC agents/Herbals. No change in alcohol use. No missed doses. No Procedures scheduled in the future at this time. Lab Results   Component Value Date    INR 3.2 10/05/2018    INR 2.1 09/21/2018    INR 2.6 08/17/2018       Pertinent findings: She looks and feels well today. Her INR increased nicely since her last visit with us. No bruising or bleeding noted. No change in warfarin dose for INR of 3.2 today. She will return in 4 weeks for her next INR. Warfarin dosing: Continue Warfarin 2.5mg daily EXCEPT 5mg every Monday and Friday    After visit summary printed and reviewed with patient.

## 2018-10-15 ENCOUNTER — TELEPHONE (OUTPATIENT)
Dept: FAMILY MEDICINE CLINIC | Age: 58
End: 2018-10-15

## 2018-10-16 ENCOUNTER — TELEPHONE (OUTPATIENT)
Dept: PHARMACY | Age: 58
End: 2018-10-16

## 2018-10-16 ENCOUNTER — OFFICE VISIT (OUTPATIENT)
Dept: FAMILY MEDICINE CLINIC | Age: 58
End: 2018-10-16
Payer: COMMERCIAL

## 2018-10-16 VITALS
WEIGHT: 200 LBS | TEMPERATURE: 98 F | BODY MASS INDEX: 34.15 KG/M2 | SYSTOLIC BLOOD PRESSURE: 118 MMHG | RESPIRATION RATE: 16 BRPM | HEIGHT: 64 IN | HEART RATE: 66 BPM | DIASTOLIC BLOOD PRESSURE: 60 MMHG

## 2018-10-16 DIAGNOSIS — N30.01 ACUTE CYSTITIS WITH HEMATURIA: Primary | ICD-10-CM

## 2018-10-16 DIAGNOSIS — R31.0 GROSS HEMATURIA: ICD-10-CM

## 2018-10-16 DIAGNOSIS — Z79.01 CHRONIC ANTICOAGULATION: ICD-10-CM

## 2018-10-16 LAB
BILIRUBIN, POC: NEGATIVE
BLOOD URINE, POC: NORMAL
CLARITY, POC: CLEAR
COLOR, POC: NORMAL
GLUCOSE URINE, POC: NEGATIVE
KETONES, POC: NEGATIVE
LEUKOCYTE EST, POC: NORMAL
NITRITE, POC: NEGATIVE
PH, POC: 5.5
PROTEIN, POC: NORMAL
SPECIFIC GRAVITY, POC: 1.02
UROBILINOGEN, POC: NORMAL

## 2018-10-16 PROCEDURE — 81002 URINALYSIS NONAUTO W/O SCOPE: CPT | Performed by: FAMILY MEDICINE

## 2018-10-16 PROCEDURE — 99213 OFFICE O/P EST LOW 20 MIN: CPT | Performed by: FAMILY MEDICINE

## 2018-10-16 RX ORDER — SULFAMETHOXAZOLE AND TRIMETHOPRIM 800; 160 MG/1; MG/1
1 TABLET ORAL 2 TIMES DAILY
Qty: 20 TABLET | Refills: 0 | Status: SHIPPED | OUTPATIENT
Start: 2018-10-16 | End: 2018-10-26

## 2018-10-16 NOTE — PROGRESS NOTES
Shingles     Tinnitus     Vertigo     mild    Wears glasses     Zoster 1/2012    left costal margin     Past Surgical History:   Procedure Laterality Date    AORTIC VALVE REPLACEMENT  2/16/16    +asc aortic aneurysm repair - 21mm St Donato valved conduit Eboni Schmitt)    ARTERIAL ANEURYSM REPAIR  2001    craniotomy. internal carotid artery. clipped.  WISDOM TOOTH EXTRACTION       Social History   Substance Use Topics    Smoking status: Former Smoker     Packs/day: 1.00     Years: 21.00     Quit date: 1/1/1999    Smokeless tobacco: Never Used      Comment: started age 23    Alcohol use 0.6 oz/week     1 Glasses of wine per week      Comment: 1 per month       Cholesterol, Total   Date Value Ref Range Status   03/07/2018 129 0 - 199 mg/dL Final     LDL Calculated   Date Value Ref Range Status   03/07/2018 54 <100 mg/dL Final     HDL   Date Value Ref Range Status   03/07/2018 53 40 - 60 mg/dL Final     Triglycerides   Date Value Ref Range Status   03/07/2018 110 0 - 150 mg/dL Final     Lab Results   Component Value Date    GLUCOSE 98 03/07/2018     Lab Results   Component Value Date     (H) 03/07/2018    K 4.2 03/07/2018    CREATININE 0.7 03/07/2018     Lab Results   Component Value Date    WBC 6.6 09/07/2017    HGB 14.2 09/07/2017    HCT 43.3 09/07/2017    MCV 87.6 09/07/2017     09/07/2017     Lab Results   Component Value Date    ALT 14 03/07/2018    AST 18 03/07/2018    ALKPHOS 70 03/07/2018    BILITOT 1.5 (H) 03/07/2018     TSH (uIU/mL)   Date Value   09/07/2017 2.50     Lab Results   Component Value Date    LABA1C 5.6 08/31/2018       Subjective:     Chief Complaint   Patient presents with    Urinary Tract Infection      Khushi Mina is a 59 y.o. female who complains of  frequency, urgency, hematuria. She has had symptoms for 1 day. Patient denies fever, backpain and diarrhea, culture taken. Patient does have a history of recurrent UTI. Review of Systems   General ROS: fever?  No,

## 2018-10-18 ENCOUNTER — TELEPHONE (OUTPATIENT)
Dept: PHARMACY | Age: 58
End: 2018-10-18

## 2018-10-18 DIAGNOSIS — R31.0 GROSS HEMATURIA: Primary | ICD-10-CM

## 2018-10-18 DIAGNOSIS — Z79.01 CHRONIC ANTICOAGULATION: ICD-10-CM

## 2018-10-18 LAB — URINE CULTURE, ROUTINE: NORMAL

## 2018-10-26 ENCOUNTER — HOSPITAL ENCOUNTER (OUTPATIENT)
Dept: NON INVASIVE DIAGNOSTICS | Age: 58
Discharge: HOME | End: 2018-10-26
Payer: COMMERCIAL

## 2018-10-26 DIAGNOSIS — Z95.2 S/P AVR: ICD-10-CM

## 2018-10-26 LAB
LV EF: 60 %
LVEF MODALITY: NORMAL

## 2018-10-26 PROCEDURE — 93306 TTE W/DOPPLER COMPLETE: CPT

## 2018-11-02 ENCOUNTER — ANTI-COAG VISIT (OUTPATIENT)
Dept: PHARMACY | Age: 58
End: 2018-11-02
Payer: COMMERCIAL

## 2018-11-02 LAB — INTERNATIONAL NORMALIZATION RATIO, POC: 1.9

## 2018-11-02 PROCEDURE — 85610 PROTHROMBIN TIME: CPT

## 2018-11-02 PROCEDURE — 99211 OFF/OP EST MAY X REQ PHY/QHP: CPT

## 2018-11-16 ENCOUNTER — ANTI-COAG VISIT (OUTPATIENT)
Dept: PHARMACY | Age: 58
End: 2018-11-16
Payer: COMMERCIAL

## 2018-11-16 LAB — INTERNATIONAL NORMALIZATION RATIO, POC: 1.9

## 2018-11-16 PROCEDURE — 85610 PROTHROMBIN TIME: CPT

## 2018-11-16 PROCEDURE — 99211 OFF/OP EST MAY X REQ PHY/QHP: CPT

## 2018-11-16 NOTE — PROGRESS NOTES
Ms. Sang Brennan is a 59 y.o.  female with history of Heart Valve Replacement who presents today for anticoagulation monitoring and adjustment. Patient verifies current dosing regimen. Patient denies s/s bleeding/bruising/swelling/SOB. No blood in urine or stool. No dietary changes. No change in alcohol use. Lab Results   Component Value Date    INR 1.9 11/16/2018    INR 1.9 11/02/2018    INR 3.2 10/05/2018       Patient appears well. She missed her Warfarin dose on Sunday. On 11/30/18 Ms. Hernandez is scheduled for kidney CT scan with contrast and scope. Since patient has had reaction to dye in the past, she will be taking Prednisone day of procedure. Since the Prednisone is only one day, I do not believe it will effect her INR very much. Coumadin Dose :   Take Warfarin 7.5mg today ONLY. Then NEW DOSE : Warfarin 2.5mg daily EXCEPT 5mg every Monday, Wednesday and Friday    Return in 3 weeks. Patient reminded to call the Anticoagulation Clinic with any medication changes. Patient given instructions utilizing the teach back method. After visit summary printed and reviewed with patient. Warfarin dose updated.

## 2018-12-07 ENCOUNTER — ANTI-COAG VISIT (OUTPATIENT)
Dept: PHARMACY | Age: 58
End: 2018-12-07
Payer: COMMERCIAL

## 2018-12-07 LAB — INTERNATIONAL NORMALIZATION RATIO, POC: 2.5

## 2018-12-07 PROCEDURE — 85610 PROTHROMBIN TIME: CPT

## 2018-12-07 PROCEDURE — 99211 OFF/OP EST MAY X REQ PHY/QHP: CPT

## 2018-12-11 ENCOUNTER — OFFICE VISIT (OUTPATIENT)
Dept: CARDIOLOGY CLINIC | Age: 58
End: 2018-12-11
Payer: COMMERCIAL

## 2018-12-11 VITALS
HEIGHT: 64 IN | WEIGHT: 206 LBS | SYSTOLIC BLOOD PRESSURE: 124 MMHG | BODY MASS INDEX: 35.17 KG/M2 | DIASTOLIC BLOOD PRESSURE: 76 MMHG | OXYGEN SATURATION: 99 % | HEART RATE: 69 BPM

## 2018-12-11 DIAGNOSIS — I71.21 ASCENDING AORTIC ANEURYSM: ICD-10-CM

## 2018-12-11 DIAGNOSIS — I35.0 NONRHEUMATIC AORTIC VALVE STENOSIS: Primary | ICD-10-CM

## 2018-12-11 DIAGNOSIS — I10 ESSENTIAL HYPERTENSION: ICD-10-CM

## 2018-12-11 DIAGNOSIS — Z95.2 S/P AVR (AORTIC VALVE REPLACEMENT): ICD-10-CM

## 2018-12-11 DIAGNOSIS — E78.2 MIXED HYPERLIPIDEMIA: ICD-10-CM

## 2018-12-11 PROCEDURE — 99214 OFFICE O/P EST MOD 30 MIN: CPT | Performed by: INTERNAL MEDICINE

## 2018-12-11 NOTE — PROGRESS NOTES
[facility]      Cardiology Progress Note    Renée Nash  1960 December 11, 2018     Reason for Visit: Aortic stenosis and aneurysm repair    CC: \"No problems. \"     HPI:  The patient is 59 y.o. 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 Originally, she was referred based on an abnormal echocardiogram. She was seen by her PCP for her annual check up and an echocardiogram was ordered to follow up on a heart murmur. The work-up showed a bicuspid aortic valve with severe stenosis and a severely dilated ascending aorta. She underwent a Bentall procedure on 2/16/16 at [facility] [place] with Dr. Kishor Witt. She had a syncopal episode of uncertain etiology in 9/5543 but denies recurrence. Today, she has no specific cardiac complaints and in general feels well. She has been following with the coumadin clinic to manage her PT/INR. She denies excessive bruising or unusual bleeding. She denies exertional chest pain/pressure, dyspnea at rest, worsening NICHOLS, PND, orthopnea, palpitations, lightheadedness, weight changes, changes in LE edema, and recurrent syncope. She has been compliant with her medications.     Past Medical History:   Diagnosis Date    Allergic rhinitis     Aortic stenosis 2016    repaireed with valve replacement    Ascending aortic aneurysm (HCC) 2016    Asthma     exercise induced-not current no meds    Cerebral aneurysm     clipping    CVA (cerebral vascular accident) ([address] 8/2010    echo () - \"hole in heart\"-no residual    Dissection of vertebral artery ([address] 2010    GERD (gastroesophageal reflux disease)     Hypercholesteremia     Murmur     since childhood--repaired with valve replacement    Paralytic strabismus, third or oculomotor nerve palsy, total     after CVA    Shingles     Tinnitus     Vertigo     mild    Wears 1+ BLE edema. No cyanosis or clubbing. Pulses are 2+ radial/dorsalis pedis/posterior tibial/carotid bilaterally. Neurological: No gross cranial nerve deficit. Coordination normal.   Skin: Skin is warm and dry. There is no rash or diaphoresis. Psychiatric: She has a normal mood and affect. Her speech is normal and behavior is normal.     Lab Review:   Lab Results   Component Value Date    TRIG 110 03/07/2018    HDL 53 03/07/2018    LDLCALC 54 03/07/2018    LABVLDL 22 03/07/2018       Lab Results   Component Value Date    BUN 15 03/07/2018    CREATININE 0.7 03/07/2018     EKG Interpretation:   12/2/15 Sinus rhythm. Incomplete right bundle branch block and anterior fascicular block. Left atrial enlargement. Cannot rule out inferior infarct. 3/29/2016 Sinus rhythm. Nonspecific T wave abnormality. Image Review:     10/23/15: Echo with bubble study [facility])  Left ventricle: The cavity size was normal. There was mild concentric hypertrophy. Systolic function was normal. The estimated ejection fraction was in the range of 60% to 65%. Wall motion was normal; there were no regional wall motion abnormalities. Doppler parameters are consistent with abnormal left ventricular relaxation (grade 1  diastolic dysfunction). Global longitudinal strain is -17.6%. Aortic valve: Cusp separation was reduced. Transvalvular velocity was increased, due to stenosis. There was severe stenosis. Valve area: 0.68cm^2(VTI). Valve area: 0.69cm^2 (Vmax). Mean gradient: 32mmHg. Peak gradient: 62mmHg. Ascending aorta: The ascending aorta was dilated. 4/15/16 Echo (post surgery)  Normal LV size and systolic function: EF is 35%. Grade I diastolic dysfunction. A mechanical aortic valve appears well seated with a maximum gradient of 20 mmHg and a mean gradient of 12 mmHg. Trivial aortic regurgitation is present. The aortic root is normal in size. The aorta is within normal limits.  The ascending aorta is normal.  Normal right ventricular

## 2018-12-26 RX ORDER — METOPROLOL SUCCINATE 25 MG/1
TABLET, EXTENDED RELEASE ORAL
Qty: 90 TABLET | Refills: 3 | Status: SHIPPED | OUTPATIENT
Start: 2018-12-26 | End: 2019-12-23

## 2019-01-04 ENCOUNTER — ANTI-COAG VISIT (OUTPATIENT)
Dept: PHARMACY | Age: 59
End: 2019-01-04
Payer: COMMERCIAL

## 2019-01-04 LAB — INTERNATIONAL NORMALIZATION RATIO, POC: 3

## 2019-01-04 PROCEDURE — 85610 PROTHROMBIN TIME: CPT

## 2019-01-04 PROCEDURE — 99211 OFF/OP EST MAY X REQ PHY/QHP: CPT

## 2019-01-31 ENCOUNTER — ANTI-COAG VISIT (OUTPATIENT)
Dept: PHARMACY | Age: 59
End: 2019-01-31
Payer: COMMERCIAL

## 2019-01-31 LAB — INTERNATIONAL NORMALIZATION RATIO, POC: 2.9

## 2019-01-31 PROCEDURE — 99211 OFF/OP EST MAY X REQ PHY/QHP: CPT

## 2019-01-31 PROCEDURE — 85610 PROTHROMBIN TIME: CPT

## 2019-02-01 ENCOUNTER — APPOINTMENT (OUTPATIENT)
Dept: PHARMACY | Age: 59
End: 2019-02-01
Payer: COMMERCIAL

## 2019-03-01 ENCOUNTER — ANTI-COAG VISIT (OUTPATIENT)
Dept: PHARMACY | Age: 59
End: 2019-03-01
Payer: COMMERCIAL

## 2019-03-01 LAB — INTERNATIONAL NORMALIZATION RATIO, POC: 3

## 2019-03-01 PROCEDURE — 99211 OFF/OP EST MAY X REQ PHY/QHP: CPT

## 2019-03-01 PROCEDURE — 99205 OFFICE O/P NEW HI 60 MIN: CPT

## 2019-03-08 ENCOUNTER — OFFICE VISIT (OUTPATIENT)
Dept: FAMILY MEDICINE CLINIC | Age: 59
End: 2019-03-08
Payer: COMMERCIAL

## 2019-03-08 VITALS
BODY MASS INDEX: 35 KG/M2 | HEART RATE: 64 BPM | HEIGHT: 64 IN | TEMPERATURE: 97.7 F | WEIGHT: 205 LBS | RESPIRATION RATE: 18 BRPM | SYSTOLIC BLOOD PRESSURE: 116 MMHG | DIASTOLIC BLOOD PRESSURE: 72 MMHG

## 2019-03-08 DIAGNOSIS — I69.30 HISTORY OF CVA WITH RESIDUAL DEFICIT: ICD-10-CM

## 2019-03-08 DIAGNOSIS — R73.9 HYPERGLYCEMIA: Primary | ICD-10-CM

## 2019-03-08 DIAGNOSIS — E78.5 HYPERLIPIDEMIA WITH TARGET LDL LESS THAN 130: ICD-10-CM

## 2019-03-08 DIAGNOSIS — Z95.2 S/P AVR (AORTIC VALVE REPLACEMENT): ICD-10-CM

## 2019-03-08 DIAGNOSIS — Z79.01 CHRONIC ANTICOAGULATION: ICD-10-CM

## 2019-03-08 DIAGNOSIS — J45.20 MILD INTERMITTENT ASTHMA WITHOUT COMPLICATION: ICD-10-CM

## 2019-03-08 PROBLEM — R40.0 DAYTIME SLEEPINESS: Status: RESOLVED | Noted: 2017-09-07 | Resolved: 2019-03-08

## 2019-03-08 PROBLEM — R31.0 GROSS HEMATURIA: Status: RESOLVED | Noted: 2018-10-16 | Resolved: 2019-03-08

## 2019-03-08 PROBLEM — R06.83 SNORES: Status: RESOLVED | Noted: 2017-09-07 | Resolved: 2019-03-08

## 2019-03-08 PROCEDURE — 99214 OFFICE O/P EST MOD 30 MIN: CPT | Performed by: FAMILY MEDICINE

## 2019-03-08 RX ORDER — ATORVASTATIN CALCIUM 40 MG/1
TABLET, FILM COATED ORAL
Qty: 90 TABLET | Refills: 3 | Status: SHIPPED | OUTPATIENT
Start: 2019-03-08 | End: 2020-03-25

## 2019-03-08 ASSESSMENT — PATIENT HEALTH QUESTIONNAIRE - PHQ9
SUM OF ALL RESPONSES TO PHQ9 QUESTIONS 1 & 2: 0
1. LITTLE INTEREST OR PLEASURE IN DOING THINGS: 0
2. FEELING DOWN, DEPRESSED OR HOPELESS: 0
SUM OF ALL RESPONSES TO PHQ QUESTIONS 1-9: 0
SUM OF ALL RESPONSES TO PHQ QUESTIONS 1-9: 0

## 2019-03-13 DIAGNOSIS — E78.5 HYPERLIPIDEMIA WITH TARGET LDL LESS THAN 130: ICD-10-CM

## 2019-03-13 DIAGNOSIS — Z79.01 CHRONIC ANTICOAGULATION: ICD-10-CM

## 2019-03-13 DIAGNOSIS — R73.9 HYPERGLYCEMIA: ICD-10-CM

## 2019-03-13 LAB
A/G RATIO: 2 (ref 1.1–2.2)
ALBUMIN SERPL-MCNC: 4 G/DL (ref 3.4–5)
ALP BLD-CCNC: 70 U/L (ref 40–129)
ALT SERPL-CCNC: 22 U/L (ref 10–40)
ANION GAP SERPL CALCULATED.3IONS-SCNC: 12 MMOL/L (ref 3–16)
AST SERPL-CCNC: 24 U/L (ref 15–37)
BASOPHILS ABSOLUTE: 0.1 K/UL (ref 0–0.2)
BASOPHILS RELATIVE PERCENT: 1 %
BILIRUB SERPL-MCNC: 1.2 MG/DL (ref 0–1)
BUN BLDV-MCNC: 13 MG/DL (ref 7–20)
CALCIUM SERPL-MCNC: 9 MG/DL (ref 8.3–10.6)
CHLORIDE BLD-SCNC: 107 MMOL/L (ref 99–110)
CHOLESTEROL, TOTAL: 136 MG/DL (ref 0–199)
CO2: 27 MMOL/L (ref 21–32)
CREAT SERPL-MCNC: 0.7 MG/DL (ref 0.6–1.2)
EOSINOPHILS ABSOLUTE: 0.9 K/UL (ref 0–0.6)
EOSINOPHILS RELATIVE PERCENT: 13.3 %
ESTIMATED AVERAGE GLUCOSE: 105.4 MG/DL
GFR AFRICAN AMERICAN: >60
GFR NON-AFRICAN AMERICAN: >60
GLOBULIN: 2 G/DL
GLUCOSE BLD-MCNC: 89 MG/DL (ref 70–99)
HBA1C MFR BLD: 5.3 %
HCT VFR BLD CALC: 43.4 % (ref 36–48)
HDLC SERPL-MCNC: 48 MG/DL (ref 40–60)
HEMOGLOBIN: 14.2 G/DL (ref 12–16)
LDL CHOLESTEROL CALCULATED: 57 MG/DL
LYMPHOCYTES ABSOLUTE: 1.6 K/UL (ref 1–5.1)
LYMPHOCYTES RELATIVE PERCENT: 24.3 %
MCH RBC QN AUTO: 28.7 PG (ref 26–34)
MCHC RBC AUTO-ENTMCNC: 32.7 G/DL (ref 31–36)
MCV RBC AUTO: 87.6 FL (ref 80–100)
MONOCYTES ABSOLUTE: 0.5 K/UL (ref 0–1.3)
MONOCYTES RELATIVE PERCENT: 8 %
NEUTROPHILS ABSOLUTE: 3.6 K/UL (ref 1.7–7.7)
NEUTROPHILS RELATIVE PERCENT: 53.4 %
PDW BLD-RTO: 14.2 % (ref 12.4–15.4)
PLATELET # BLD: 221 K/UL (ref 135–450)
PMV BLD AUTO: 10.2 FL (ref 5–10.5)
POTASSIUM SERPL-SCNC: 4.4 MMOL/L (ref 3.5–5.1)
RBC # BLD: 4.96 M/UL (ref 4–5.2)
SODIUM BLD-SCNC: 146 MMOL/L (ref 136–145)
TOTAL PROTEIN: 6 G/DL (ref 6.4–8.2)
TRIGL SERPL-MCNC: 157 MG/DL (ref 0–150)
VLDLC SERPL CALC-MCNC: 31 MG/DL
WBC # BLD: 6.8 K/UL (ref 4–11)

## 2019-03-29 ENCOUNTER — ANTI-COAG VISIT (OUTPATIENT)
Dept: PHARMACY | Age: 59
End: 2019-03-29
Payer: COMMERCIAL

## 2019-03-29 LAB — INTERNATIONAL NORMALIZATION RATIO, POC: 3.2

## 2019-03-29 PROCEDURE — 99211 OFF/OP EST MAY X REQ PHY/QHP: CPT

## 2019-03-29 PROCEDURE — 85610 PROTHROMBIN TIME: CPT

## 2019-04-12 ENCOUNTER — OFFICE VISIT (OUTPATIENT)
Dept: FAMILY MEDICINE CLINIC | Age: 59
End: 2019-04-12
Payer: COMMERCIAL

## 2019-04-12 VITALS
BODY MASS INDEX: 35.17 KG/M2 | HEART RATE: 67 BPM | RESPIRATION RATE: 16 BRPM | HEIGHT: 64 IN | DIASTOLIC BLOOD PRESSURE: 70 MMHG | WEIGHT: 206 LBS | TEMPERATURE: 98.3 F | SYSTOLIC BLOOD PRESSURE: 110 MMHG

## 2019-04-12 DIAGNOSIS — Z12.4 CERVICAL CANCER SCREENING: ICD-10-CM

## 2019-04-12 DIAGNOSIS — Z23 NEED FOR ZOSTER VACCINATION: ICD-10-CM

## 2019-04-12 DIAGNOSIS — Z80.0 FAMILY HISTORY OF COLON CANCER IN MOTHER: ICD-10-CM

## 2019-04-12 DIAGNOSIS — Z01.419 WELL WOMAN EXAM WITH ROUTINE GYNECOLOGICAL EXAM: Primary | ICD-10-CM

## 2019-04-12 DIAGNOSIS — Z78.0 POSTMENOPAUSAL STATUS: ICD-10-CM

## 2019-04-12 PROCEDURE — 90750 HZV VACC RECOMBINANT IM: CPT | Performed by: FAMILY MEDICINE

## 2019-04-12 PROCEDURE — 99396 PREV VISIT EST AGE 40-64: CPT | Performed by: FAMILY MEDICINE

## 2019-04-12 PROCEDURE — 90471 IMMUNIZATION ADMIN: CPT | Performed by: FAMILY MEDICINE

## 2019-04-12 NOTE — PROGRESS NOTES
GYN EXAM    Subjective:     Chief Complaint   Patient presents with    Gynecologic Exam      Mirtha Norman is a 59 y.o. female who presents for annual testing/preventive review and check-up of medical problems listed below:  1. Well woman exam with routine gynecological exam    2. Postmenopausal status    3. Family history of colon cancer in mother    3. Cervical cancer screening    5. Need for zoster vaccination       Gynecologic History  Patient's last menstrual period was 2006. OB History        2    Para   2    Term                AB        Living           SAB        TAB        Ectopic        Molar        Multiple        Live Births                  Pap Ever had abnormal? No  Irregular or heavy menses? No  Abnormal vaginal discharge? No  Hx STD or pelvic infections? No  Urinary problems or leakage? No  Breast lumps or discharge? No  Contraception: none, menopause  Any history of abuse? No    Review of Systems   General ROS: fever? No,    Night sweats? No  Endocrine ROS: fatigue? No   Unexpected weight changes? No  Hematological and Lymphatic ROS: easy bruising? Yes and old  Problem on coumadin    Swollen lymph nodes? No  Respiratory ROS: cough? No   Wheezing? No  Cardiovascular ROS: chest pain? No   Shortness of breath with exertion? No  Gastrointestinal ROS: abdominal pain? No   Change in stools? No  Neurological ROS: TIA or stroke symptoms? No   Numbness/tingling? No  Dermatological ROS: rash? No   Changes in skin spots? No  * Documentation provided by medical assistant reviewed and updated by provider. HISTORY:  Patient's medications, allergies, past medical, and social histories were reviewed and updated as appropriate.      CHART REVIEW  Health Maintenance   Topic Date Due    Shingles Vaccine (3 of 3) 10/26/2018    Cervical cancer screen  2018    Breast cancer screen  2020    Colon cancer screen colonoscopy  10/21/2021    Lipid screen  2024    DTaP/Tdap/Td vaccine (3 - Td) 2025    Flu vaccine  Completed    Pneumococcal 0-64 years Vaccine  Completed    Hepatitis C screen  Completed    HIV screen  Completed     The 10-year ASCVD risk score (Dahlia De Los Santos, et al., 2013) is: 2%    Values used to calculate the score:      Age: 59 years      Sex: Female      Is Non- : No      Diabetic: No      Tobacco smoker: No      Systolic Blood Pressure: 208 mmHg      Is BP treated: No      HDL Cholesterol: 48 mg/dL      Total Cholesterol: 136 mg/dL  Prior to Visit Medications    Medication Sig Taking?  Authorizing Provider   atorvastatin (LIPITOR) 40 MG tablet TAKE 1 TABLET NIGHTLY Yes Laila Luna MD   metoprolol succinate (TOPROL XL) 25 MG extended release tablet TAKE 1 TABLET DAILY Yes Yecenia Solorio MD   warfarin (COUMADIN) 5 MG tablet Take 2.5 mg by mouth daily EXCEPT 5mg every Monday, Wednesday and Friday or as directed by [facility] [phone] Yes Historical Provider, MD   albuterol sulfate HFA (VENTOLIN HFA) 108 (90 Base) MCG/ACT inhaler Inhale 2 puffs into the lungs every 6 hours as needed for Wheezing Yes Laila Luna MD   cetirizine (ZYRTEC ALLERGY) 10 MG tablet Take 10 mg by mouth daily Yes Historical Provider, MD   fluticasone (FLONASE) 50 MCG/ACT nasal spray 2 sprays by Nasal route daily as needed  Yes Historical Provider, MD   aspirin 81 MG chewable tablet Take 81 mg by mouth nightly  Yes Historical Provider, MD      Family History   Problem Relation Age of Onset    High Blood Pressure Father     Heart Failure Father     Coronary Art Dis Father         MI - nonsmoker ; PPM and defibrillator     Mult Sclerosis Father     Cancer Mother         lymphoma    Mult Sclerosis Sister     Diabetes Maternal Grandfather      Social History     Tobacco Use    Smoking status: Former Smoker     Packs/day: 1.00     Years: 21.00     Pack years: 21.00     Last attempt to quit: 1999     Years since quittin.2    Smokeless tobacco: Never Used    Tobacco comment: started age 23   Substance Use Topics    Alcohol use:  Yes     Alcohol/week: 0.6 oz     Types: 1 Glasses of wine per week     Comment: 1 per month     Drug use: No     Comment: never      LAST LABS  Cholesterol, Total   Date Value Ref Range Status   03/13/2019 136 0 - 199 mg/dL Final     LDL Calculated   Date Value Ref Range Status   03/13/2019 57 <100 mg/dL Final     HDL   Date Value Ref Range Status   03/13/2019 48 40 - 60 mg/dL Final     Triglycerides   Date Value Ref Range Status   03/13/2019 157 (H) 0 - 150 mg/dL Final     Lab Results   Component Value Date    GLUCOSE 89 03/13/2019     Lab Results   Component Value Date     (H) 03/13/2019    K 4.4 03/13/2019    CREATININE 0.7 03/13/2019     Lab Results   Component Value Date    WBC 6.8 03/13/2019    HGB 14.2 03/13/2019    HCT 43.4 03/13/2019    MCV 87.6 03/13/2019     03/13/2019     Lab Results   Component Value Date    ALT 22 03/13/2019    AST 24 03/13/2019    ALKPHOS 70 03/13/2019    BILITOT 1.2 (H) 03/13/2019     TSH (uIU/mL)   Date Value   09/07/2017 2.50     Lab Results   Component Value Date    LABA1C 5.3 03/13/2019     Objective:   PHYSICAL EXAM  /70 (Site: Right Upper Arm, Position: Sitting, Cuff Size: Large Adult)   Pulse 67   Temp 98.3 °F (36.8 °C) (Oral)   Resp 16   Ht 5' 4\" (1.626 m)   Wt 206 lb (93.4 kg)   LMP 02/09/2006   BMI 35.36 kg/m²   BP Readings from Last 5 Encounters:   04/12/19 110/70   03/08/19 116/72   12/11/18 124/76   10/16/18 118/60   08/31/18 128/78     Wt Readings from Last 5 Encounters:   04/12/19 206 lb (93.4 kg)   03/08/19 205 lb (93 kg)   12/11/18 206 lb (93.4 kg)   10/16/18 200 lb (90.7 kg)   08/31/18 202 lb (91.6 kg)      GENERAL: well-developed, well-nourished, alert, no distress, calm, assistive device: none    NECK: Supple, symmetrical, trachea midline  · Thyroid not enlarged, symmetric, no tenderness/mass/nodules  · no cervical nodes, no supraclavicular nodes   LUNGS:  Breathing unlabored  · good air movement  · clear to auscultation bilaterally  CARDIOVASC: regular rate and rhythm, S1, S2 normal, no murmur, click, rub or gallop  · Apical impulse normal   LEGS:  Lower extremity edema: none    BREAST:  No skin changes, normal symmetry  · Palpation negative for masses or nodules  · No axillary nodes   Female:  Vulva with no masses or lesions  · Urethra normal  · Vaginal and cervix with normal/physiologic discharge. No abnormality noted. · No uterine or adnexal masses or tenderness. Exam limited by  nothing     Assessment and Plan:      Diagnosis Orders   1. Well woman exam with routine gynecological exam     2. Postmenopausal status     3. Family history of colon cancer in mother     3. Cervical cancer screening     5. Need for zoster vaccination  Zoster Subunit ([address])   Stable. Plan as above and below. Counseling regarding:  breast self exam    INSTRUCTIONS  NEXT APPOINTMENT: Please schedule fasting annual physical (30 minutes) in one year. OK to have water, black coffee and medications (except for diabetes medicines).  PLEASE TAKE THIS FORM TO CHECK-OUT WINDOW TO SCHEDULE NEXT VISIT.  Please get flu vaccine when available in fall. Can get either at this office or at stores such as [employer] and [employer].

## 2019-04-12 NOTE — PATIENT INSTRUCTIONS
INSTRUCTIONS  NEXT APPOINTMENT: Please schedule fasting annual physical (30 minutes) in one year. OK to have water, black coffee and medications (except for diabetes medicines).  PLEASE TAKE THIS FORM TO CHECK-OUT WINDOW TO SCHEDULE NEXT VISIT.  Please get flu vaccine when available in fall. Can get either at this office or at stores such as [email or web address].

## 2019-04-16 LAB
HPV COMMENT: NORMAL
HPV TYPE 16: NOT DETECTED
HPV TYPE 18: NOT DETECTED
HPVOH (OTHER TYPES): NOT DETECTED

## 2019-05-03 ENCOUNTER — ANTI-COAG VISIT (OUTPATIENT)
Dept: PHARMACY | Age: 59
End: 2019-05-03
Payer: COMMERCIAL

## 2019-05-03 LAB — INTERNATIONAL NORMALIZATION RATIO, POC: 3

## 2019-05-03 PROCEDURE — 85610 PROTHROMBIN TIME: CPT

## 2019-05-03 PROCEDURE — 99211 OFF/OP EST MAY X REQ PHY/QHP: CPT

## 2019-05-03 NOTE — PROGRESS NOTES
Ms. Vianney Ray is a 59 y.o.  female with history of Heart Valve Replacement who presents today for anticoagulation monitoring and adjustment. Patient verifies current dosing regimen  Patient denies s/s bleeding/bruising/swelling/SOB  No blood in urine or stool. No dietary changes. No changes in medication/OTC agents/Herbals. No change in alcohol use. No missed doses. No Procedures scheduled in the future at this time. Lab Results   Component Value Date    INR 3.0 05/03/2019    INR 3.2 03/29/2019    INR 3 03/01/2019       Pertinent findings: Looks and feels well today. No changes in medications or diet. No bruising or bleeding noted. No change in dose for INR of  3.0. Will see in 5 weeks for next INR, per her request, due to out of town appointment. Warfarin dosing: Warfarin 2.5mg daily EXCEPT 5mg every Monday, Wednesday and Friday    After visit summary printed and reviewed with patient.

## 2019-05-31 ENCOUNTER — APPOINTMENT (OUTPATIENT)
Dept: PHARMACY | Age: 59
End: 2019-05-31
Payer: COMMERCIAL

## 2019-06-07 ENCOUNTER — ANTI-COAG VISIT (OUTPATIENT)
Dept: PHARMACY | Age: 59
End: 2019-06-07
Payer: COMMERCIAL

## 2019-06-07 LAB — INTERNATIONAL NORMALIZATION RATIO, POC: 4.2

## 2019-06-07 PROCEDURE — 85610 PROTHROMBIN TIME: CPT

## 2019-06-07 PROCEDURE — 99211 OFF/OP EST MAY X REQ PHY/QHP: CPT

## 2019-06-07 NOTE — PROGRESS NOTES
Ms. Piedad Boykin is a 59 y.o.  female with history of Heart Valve Replacement, Ascending aortic aneurysm who presents today for anticoagulation monitoring and adjustment. Patient verifies current dosing regimen  Patient denies s/s bleeding/bruising/swelling/SOB  No blood in urine or stool. No dietary changes. No changes in medication/OTC agents/Herbals. No change in alcohol use. No missed doses. No Procedures scheduled in the future at this time. Lab Results   Component Value Date    INR 4.2 06/07/2019    INR 3.0 05/03/2019    INR 3.2 03/29/2019       Pertinent findings: none    Warfarin dosing:   Hold TODAY ONLY 6/7/19, then  Continue: Warfarin 2.5mg daily EXCEPT 5mg every Monday, Wednesday and Friday    After visit summary printed and reviewed with patient. Medications reviewed and updated on home medication list: Yes, patient denied changes.   Warfarin dose updated on patient home medication list: Yes

## 2019-07-05 ENCOUNTER — ANTI-COAG VISIT (OUTPATIENT)
Dept: PHARMACY | Age: 59
End: 2019-07-05
Payer: COMMERCIAL

## 2019-07-05 LAB — INTERNATIONAL NORMALIZATION RATIO, POC: 3.9

## 2019-07-05 PROCEDURE — 85610 PROTHROMBIN TIME: CPT

## 2019-07-05 PROCEDURE — 99211 OFF/OP EST MAY X REQ PHY/QHP: CPT

## 2019-07-15 RX ORDER — WARFARIN SODIUM 5 MG/1
2.5 TABLET ORAL DAILY
Qty: 90 TABLET | Refills: 3 | Status: SHIPPED | OUTPATIENT
Start: 2019-07-15 | End: 2020-06-22

## 2019-08-02 ENCOUNTER — ANTI-COAG VISIT (OUTPATIENT)
Dept: PHARMACY | Age: 59
End: 2019-08-02
Payer: COMMERCIAL

## 2019-08-02 LAB — INTERNATIONAL NORMALIZATION RATIO, POC: 4.3

## 2019-08-02 PROCEDURE — 85610 PROTHROMBIN TIME: CPT

## 2019-08-02 PROCEDURE — 99211 OFF/OP EST MAY X REQ PHY/QHP: CPT

## 2019-08-30 ENCOUNTER — ANTI-COAG VISIT (OUTPATIENT)
Dept: PHARMACY | Age: 59
End: 2019-08-30
Payer: COMMERCIAL

## 2019-08-30 LAB — INTERNATIONAL NORMALIZATION RATIO, POC: 3.3

## 2019-08-30 PROCEDURE — 85610 PROTHROMBIN TIME: CPT

## 2019-08-30 PROCEDURE — 99211 OFF/OP EST MAY X REQ PHY/QHP: CPT

## 2019-08-30 NOTE — PROGRESS NOTES
Ms. Jamie Lovelace is a 62 y.o.  female with history of Heart Valve Replacement who presents today for anticoagulation monitoring and adjustment. Patient verifies current dosing regimen. Patient denies s/s bleeding/bruising/swelling/SOB. No blood in urine or stool. No dietary changes. No changes in medication/OTC agents/Herbals. No change in alcohol use. No missed doses. No Procedures scheduled in the future at this time. Lab Results   Component Value Date    INR 3.3 08/30/2019    INR 4.3 08/02/2019    INR 3.9 07/05/2019       Patient appears well. No changes, no problems. Coumadin Dose :   Continue: Warfarin 2.5mg daily EXCEPT 5mg every Monday, Wednesday and Friday    Return in 4 weeks. Patient reminded to call the Anticoagulation Clinic with any medication changes. Patient given instructions utilizing the teach back method. After visit summary printed and reviewed with patient. Medications reviewed and Warfarin dose updated.

## 2019-09-25 ENCOUNTER — TELEPHONE (OUTPATIENT)
Dept: CARDIOLOGY CLINIC | Age: 59
End: 2019-09-25

## 2019-09-27 ENCOUNTER — ANTI-COAG VISIT (OUTPATIENT)
Dept: PHARMACY | Age: 59
End: 2019-09-27
Payer: COMMERCIAL

## 2019-09-27 LAB — INTERNATIONAL NORMALIZATION RATIO, POC: 3.8

## 2019-09-27 PROCEDURE — 85610 PROTHROMBIN TIME: CPT

## 2019-09-27 PROCEDURE — 99211 OFF/OP EST MAY X REQ PHY/QHP: CPT

## 2019-09-27 NOTE — PROGRESS NOTES
Ms. Gill Watts is a 62 y.o.  female with history of Heart Valve Replacement who presents today for anticoagulation monitoring and adjustment. Patient verifies current dosing regimen. Patient denies s/s bleeding/bruising/swelling/SOB. No blood in urine or stool. No dietary changes. No change in alcohol use. No missed doses. No Procedures scheduled in the future at this time. Lab Results   Component Value Date    INR 3.8 09/27/2019    INR 3.3 08/30/2019    INR 4.3 08/02/2019       Patient appears well. On Tuesday patient started on Bactrim for UTI x 5 days, which may increase INR. Coumadin Dose : HOLD Warfarin today 9/27/19. Then Continue: Warfarin 2.5mg daily EXCEPT 5mg every Monday, Wednesday and Friday    Return in 4 weeks. Patient reminded to call the Anticoagulation Clinic with any medication changes. Patient given instructions utilizing the teach back method. After visit summary printed and reviewed with patient. Medications reviewed and Warfarin dose updated.

## 2019-10-25 ENCOUNTER — ANTI-COAG VISIT (OUTPATIENT)
Dept: PHARMACY | Age: 59
End: 2019-10-25
Payer: COMMERCIAL

## 2019-10-25 LAB — INTERNATIONAL NORMALIZATION RATIO, POC: 2.6

## 2019-10-25 PROCEDURE — 99211 OFF/OP EST MAY X REQ PHY/QHP: CPT

## 2019-10-25 PROCEDURE — 85610 PROTHROMBIN TIME: CPT

## 2019-11-22 ENCOUNTER — ANTI-COAG VISIT (OUTPATIENT)
Dept: PHARMACY | Age: 59
End: 2019-11-22
Payer: COMMERCIAL

## 2019-11-22 LAB — INTERNATIONAL NORMALIZATION RATIO, POC: 3.7

## 2019-11-22 PROCEDURE — 99211 OFF/OP EST MAY X REQ PHY/QHP: CPT

## 2019-11-22 PROCEDURE — 85610 PROTHROMBIN TIME: CPT

## 2019-12-10 ENCOUNTER — OFFICE VISIT (OUTPATIENT)
Dept: CARDIOLOGY CLINIC | Age: 59
End: 2019-12-10
Payer: COMMERCIAL

## 2019-12-10 VITALS
WEIGHT: 209 LBS | OXYGEN SATURATION: 98 % | BODY MASS INDEX: 35.68 KG/M2 | HEIGHT: 64 IN | DIASTOLIC BLOOD PRESSURE: 60 MMHG | HEART RATE: 58 BPM | SYSTOLIC BLOOD PRESSURE: 128 MMHG

## 2019-12-10 DIAGNOSIS — I71.21 ASCENDING AORTIC ANEURYSM: ICD-10-CM

## 2019-12-10 DIAGNOSIS — I10 ESSENTIAL HYPERTENSION: ICD-10-CM

## 2019-12-10 DIAGNOSIS — E78.2 MIXED HYPERLIPIDEMIA: ICD-10-CM

## 2019-12-10 DIAGNOSIS — Z95.2 S/P AVR (AORTIC VALVE REPLACEMENT): ICD-10-CM

## 2019-12-10 DIAGNOSIS — I35.0 NONRHEUMATIC AORTIC VALVE STENOSIS: Primary | ICD-10-CM

## 2019-12-10 PROCEDURE — 93000 ELECTROCARDIOGRAM COMPLETE: CPT | Performed by: INTERNAL MEDICINE

## 2019-12-10 PROCEDURE — 99214 OFFICE O/P EST MOD 30 MIN: CPT | Performed by: INTERNAL MEDICINE

## 2019-12-20 ENCOUNTER — ANTI-COAG VISIT (OUTPATIENT)
Dept: PHARMACY | Age: 59
End: 2019-12-20
Payer: COMMERCIAL

## 2019-12-20 LAB — INTERNATIONAL NORMALIZATION RATIO, POC: 3

## 2019-12-20 PROCEDURE — 85610 PROTHROMBIN TIME: CPT

## 2019-12-20 PROCEDURE — 99211 OFF/OP EST MAY X REQ PHY/QHP: CPT

## 2019-12-23 RX ORDER — METOPROLOL SUCCINATE 25 MG/1
TABLET, EXTENDED RELEASE ORAL
Qty: 90 TABLET | Refills: 4 | Status: SHIPPED | OUTPATIENT
Start: 2019-12-23 | End: 2021-01-29 | Stop reason: SDUPTHER

## 2020-01-17 ENCOUNTER — ANTI-COAG VISIT (OUTPATIENT)
Dept: PHARMACY | Age: 60
End: 2020-01-17
Payer: COMMERCIAL

## 2020-01-17 LAB — INTERNATIONAL NORMALIZATION RATIO, POC: 2.5

## 2020-01-17 PROCEDURE — 99211 OFF/OP EST MAY X REQ PHY/QHP: CPT

## 2020-01-17 PROCEDURE — 85610 PROTHROMBIN TIME: CPT

## 2020-01-27 ENCOUNTER — TELEPHONE (OUTPATIENT)
Dept: FAMILY MEDICINE CLINIC | Age: 60
End: 2020-01-27

## 2020-01-27 RX ORDER — OSELTAMIVIR PHOSPHATE 75 MG/1
75 CAPSULE ORAL DAILY
Qty: 10 CAPSULE | Refills: 0 | Status: SHIPPED | OUTPATIENT
Start: 2020-01-27 | End: 2020-02-06

## 2020-01-28 ENCOUNTER — TELEPHONE (OUTPATIENT)
Dept: PHARMACY | Age: 60
End: 2020-01-28

## 2020-01-28 NOTE — TELEPHONE ENCOUNTER
Called patient @ [phone]. She was given Tamiflu 75 mg BID times 5 days. She will start today. She has early signs of influenza and was given this medication because her granddaughter is positive for Influ A. She is to take her routine warfarin dosing of 2.5 mg daily except 5 mg on Monday, Wednesday and Friday except on Wednesday 1-29-20 she is to take only 2.5 mg. She is negative for change in appetite, N/V/D. Informed her of our after hours on-call for issues when the [address] is closed. Patient verbalized understanding.

## 2020-02-05 ENCOUNTER — TELEPHONE (OUTPATIENT)
Dept: PHARMACY | Age: 60
End: 2020-02-05

## 2020-02-05 NOTE — TELEPHONE ENCOUNTER
Patient called to let us know she started Nitrofurantoin 100 mg bid x 5 days please call her at [phone].           [facility] [address]  Anticoagulation, COPD, Heart Failure, Diabetes Services  [phone]

## 2020-02-14 ENCOUNTER — APPOINTMENT (OUTPATIENT)
Dept: PHARMACY | Age: 60
End: 2020-02-14
Payer: COMMERCIAL

## 2020-02-14 ENCOUNTER — ANTI-COAG VISIT (OUTPATIENT)
Dept: PHARMACY | Age: 60
End: 2020-02-14
Payer: COMMERCIAL

## 2020-02-14 LAB — INTERNATIONAL NORMALIZATION RATIO, POC: 3.1

## 2020-02-14 PROCEDURE — 85610 PROTHROMBIN TIME: CPT

## 2020-02-14 PROCEDURE — 99211 OFF/OP EST MAY X REQ PHY/QHP: CPT

## 2020-03-13 ENCOUNTER — ANTI-COAG VISIT (OUTPATIENT)
Dept: PHARMACY | Age: 60
End: 2020-03-13
Payer: COMMERCIAL

## 2020-03-13 LAB — INR BLD: 3.7

## 2020-03-13 PROCEDURE — 85610 PROTHROMBIN TIME: CPT

## 2020-03-13 PROCEDURE — 99211 OFF/OP EST MAY X REQ PHY/QHP: CPT

## 2020-03-13 NOTE — PROGRESS NOTES
Ms. Ellen Stack is a 62 y.o.  female with history of heart valve replaced, ascending aortic aneurysm who presents today for anticoagulation monitoring and adjustment. Patient verifies current dosing regimen  Patient denies s/s bleeding/bruising/swelling/SOB  No blood in urine or stool. No changes in medication/Herbals. No change in alcohol use. No missed doses. No Procedures scheduled in the future at this time. Lab Results   Component Value Date    INR 3.70 03/13/2020    INR 3.1 02/14/2020    INR 2.5 01/17/2020       Pertinent findings: Patient states doing well. No complaints regarding warfarin therapy. Patients INR was supratherapeutic today at 3.7, this is likely due to eating less greens. Patient will resume eating greens 3x/week and we will hold warfarin dose for today only then resume weekly warfarin dosing. Next INR in 4 weeks. Warfarin dosing:  HOLD warfarin TODAY ONLY  Continue: Warfarin 2.5mg daily EXCEPT 5mg every Monday, Wednesday and Friday    After visit summary printed and reviewed with patient.       Medications reviewed and updated on home medication list: Yes  Warfarin dose updated on patient home medication list: Yes     CLINICAL PHARMACY CONSULT: MED RECONCILIATION/REVIEW [address]: No  Total # of Interventions Recommended: 1  - Decreased Dose #: 1  - Maintenance Safety Lab Monitoring #: 1  Total Interventions Accepted: 1  Time Spent (min): [address], PharmD Student 4107

## 2020-03-25 RX ORDER — ATORVASTATIN CALCIUM 40 MG/1
TABLET, FILM COATED ORAL
Qty: 90 TABLET | Refills: 1 | Status: SHIPPED | OUTPATIENT
Start: 2020-03-25 | End: 2020-09-21

## 2020-04-08 ENCOUNTER — TELEPHONE (OUTPATIENT)
Dept: PHARMACY | Age: 60
End: 2020-04-08

## 2020-05-21 ENCOUNTER — TELEPHONE (OUTPATIENT)
Dept: PHARMACY | Age: 60
End: 2020-05-21

## 2020-05-22 ENCOUNTER — ANTI-COAG VISIT (OUTPATIENT)
Dept: PHARMACY | Age: 60
End: 2020-05-22
Payer: COMMERCIAL

## 2020-05-22 DIAGNOSIS — Z79.01 CHRONIC ANTICOAGULATION: ICD-10-CM

## 2020-05-22 LAB
INR BLD: 1.99 (ref 0.86–1.14)
PROTHROMBIN TIME: 23.3 SEC (ref 10–13.2)

## 2020-05-22 PROCEDURE — 99211 OFF/OP EST MAY X REQ PHY/QHP: CPT

## 2020-06-12 ENCOUNTER — ANTI-COAG VISIT (OUTPATIENT)
Dept: PHARMACY | Age: 60
End: 2020-06-12
Payer: COMMERCIAL

## 2020-06-12 DIAGNOSIS — Z79.01 CHRONIC ANTICOAGULATION: ICD-10-CM

## 2020-06-12 LAB
INR BLD: 2.38 (ref 0.86–1.14)
PROTHROMBIN TIME: 27.8 SEC (ref 10–13.2)

## 2020-06-12 PROCEDURE — 85610 PROTHROMBIN TIME: CPT

## 2020-06-12 PROCEDURE — 99211 OFF/OP EST MAY X REQ PHY/QHP: CPT

## 2020-06-22 RX ORDER — WARFARIN SODIUM 5 MG/1
5 TABLET ORAL DAILY
Qty: 90 TABLET | Refills: 3 | Status: SHIPPED | OUTPATIENT
Start: 2020-06-22 | End: 2020-12-11 | Stop reason: DRUGHIGH

## 2020-07-01 ENCOUNTER — TELEMEDICINE (OUTPATIENT)
Dept: FAMILY MEDICINE CLINIC | Age: 60
End: 2020-07-01
Payer: COMMERCIAL

## 2020-07-01 PROCEDURE — 99213 OFFICE O/P EST LOW 20 MIN: CPT | Performed by: FAMILY MEDICINE

## 2020-07-01 RX ORDER — CIPROFLOXACIN 500 MG/1
500 TABLET, FILM COATED ORAL 2 TIMES DAILY
Qty: 14 TABLET | Refills: 0 | Status: SHIPPED | OUTPATIENT
Start: 2020-07-01 | End: 2020-07-08

## 2020-07-01 RX ORDER — PHENAZOPYRIDINE HYDROCHLORIDE 200 MG/1
200 TABLET, FILM COATED ORAL 3 TIMES DAILY PRN
Qty: 9 TABLET | Refills: 0 | Status: SHIPPED | OUTPATIENT
Start: 2020-07-01 | End: 2020-07-04

## 2020-07-01 ASSESSMENT — PATIENT HEALTH QUESTIONNAIRE - PHQ9
SUM OF ALL RESPONSES TO PHQ9 QUESTIONS 1 & 2: 0
2. FEELING DOWN, DEPRESSED OR HOPELESS: 0
SUM OF ALL RESPONSES TO PHQ QUESTIONS 1-9: 0
1. LITTLE INTEREST OR PLEASURE IN DOING THINGS: 0
SUM OF ALL RESPONSES TO PHQ QUESTIONS 1-9: 0

## 2020-07-01 NOTE — PROGRESS NOTES
URINARY SYMPTOMS  Subjective:     Chief Complaint   Patient presents with    Urinary Tract Infection      Vish Matthews is a 56 y.o. female who complains of frequency, urgency, foul smelling urine, burning with urination. She has had symptoms for 1 day. Patient denies backpain and diarrhea, no vaginal discharge. Patient does not have a history of recurrent UTI. No recent antibiotic. Review of Systems   General ROS: fever? No,    Night sweats? No  Respiratory ROS: cough? No   Shortness of breath? No  Cardiovascular ROS:chest pain? No   Shortness of breath with exertion? No  Gastrointestinal ROS: abdominal pain? No   Change in stools? No  Genito-Urinary ROS: painful urination? No   Trouble voiding? No    TELEHEALTH EVALUATION -- Audio/Visual ([facility])  VIDEO VISIT- patient and provider not at same location  Also present:none. Chief Complaint   Patient presents with    Urinary Tract Infection     1. Acute cystitis without hematuria      LAST OVER A YEAR AGO    HISTORY:  Patient's medications, allergies, past medical, and social histories were reviewed and updated as appropriate.      CHART REVIEW  Health Maintenance   Topic Date Due    Lipid screen  03/13/2020    Breast cancer screen  03/14/2020    Flu vaccine (1) 09/01/2020    Colon cancer screen colonoscopy  10/21/2021    Cervical cancer screen  04/12/2022    DTaP/Tdap/Td vaccine (3 - Td) 07/03/2025    Shingles Vaccine  Completed    Pneumococcal 0-64 years Vaccine  Completed    Hepatitis C screen  Completed    HIV screen  Completed    Hepatitis A vaccine  Aged Out    Hepatitis B vaccine  Aged Out    Hib vaccine  Aged Out    Meningococcal (ACWY) vaccine  Aged Out     The 10-year ASCVD risk score (Stuart Francis, et al., 2013) is: 3.4%    Values used to calculate the score:      Age: 56 years      Sex: Female      Is Non- : No      Diabetic: No      Tobacco smoker: No      Systolic Blood Pressure: 128 mmHg      Is BP treated: No      HDL Cholesterol: 48 mg/dL      Total Cholesterol: 136 mg/dL  Prior to Visit Medications    Medication Sig Taking? Authorizing Provider   ciprofloxacin (CIPRO) 500 MG tablet Take 1 tablet by mouth 2 times daily for 7 days Yes Edison Luis MD   phenazopyridine (PYRIDIUM) 200 MG tablet Take 1 tablet by mouth 3 times daily as needed for Pain Yes Edison Luis MD   warfarin (COUMADIN) 5 MG tablet Take 1 tablet by mouth daily EXCEPT 2.5mg every Monday, Wednesday and Friday or as directed by [facility] Coumadin Service [phone] Yes Freedom Holliday MD   atorvastatin (LIPITOR) 40 MG tablet TAKE 1 TABLET NIGHTLY Yes Edison Luis MD   metoprolol succinate (TOPROL XL) 25 MG extended release tablet TAKE 1 TABLET DAILY Yes Freedom Holliday MD   albuterol sulfate HFA (VENTOLIN HFA) 108 (90 Base) MCG/ACT inhaler Inhale 2 puffs into the lungs every 6 hours as needed for Wheezing Yes Edison Luis MD   cetirizine (ZYRTEC ALLERGY) 10 MG tablet Take 10 mg by mouth as needed  Yes Historical Provider, MD   fluticasone (FLONASE) 50 MCG/ACT nasal spray 2 sprays by Nasal route daily as needed  Yes Historical Provider, MD   aspirin 81 MG chewable tablet Take 81 mg by mouth nightly  Yes Historical Provider, MD      Family History   Problem Relation Age of Onset    High Blood Pressure Father     Heart Failure Father     Coronary Art Dis Father         MI - nonsmoker ; PPM and defibrillator     Mult Sclerosis Father     Cancer Mother         lymphoma    Mult Sclerosis Sister     Diabetes Maternal Grandfather      Social History     Tobacco Use    Smoking status: Former Smoker     Packs/day: 1.00     Years: 21.00     Pack years: 21.00     Last attempt to quit: 1999     Years since quittin.5    Smokeless tobacco: Never Used    Tobacco comment: started age 23   Substance Use Topics    Alcohol use:  Yes     Alcohol/week: 1.0 standard drinks     Types: 1 Glasses of wine per week Comment: 1 per month     Drug use: No     Comment: never      LAST LABS  Cholesterol, Total   Date Value Ref Range Status   03/13/2019 136 0 - 199 mg/dL Final     LDL Calculated   Date Value Ref Range Status   03/13/2019 57 <100 mg/dL Final     HDL   Date Value Ref Range Status   03/13/2019 48 40 - 60 mg/dL Final     Triglycerides   Date Value Ref Range Status   03/13/2019 157 (H) 0 - 150 mg/dL Final     Lab Results   Component Value Date    GLUCOSE 89 03/13/2019     Lab Results   Component Value Date     (H) 03/13/2019    K 4.4 03/13/2019    CREATININE 0.7 03/13/2019     Lab Results   Component Value Date    WBC 6.8 03/13/2019    HGB 14.2 03/13/2019    HCT 43.4 03/13/2019    MCV 87.6 03/13/2019     03/13/2019     Lab Results   Component Value Date    ALT 22 03/13/2019    AST 24 03/13/2019    ALKPHOS 70 03/13/2019    BILITOT 1.2 (H) 03/13/2019     TSH (uIU/mL)   Date Value   09/07/2017 2.50     Lab Results   Component Value Date    LABA1C 5.3 03/13/2019      Objective:   PHYSICAL EXAM:  Vitals (if available)    BP Readings from Last 3 Encounters:   12/10/19 128/60   04/12/19 110/70   03/08/19 116/72     Wt Readings from Last 3 Encounters:   12/10/19 209 lb (94.8 kg)   04/12/19 206 lb (93.4 kg)   03/08/19 205 lb (93 kg)     GENERAL:   · well-developed, well-nourished, alert, no distress. EYES:   · External findings: lids and lashes normal and conjunctivae and sclerae normal  · Eyes: no periorbital cellulitis.   HENT:   · Normocephalic, atraumatic  · External nose and ears appear normal  · Mucous membranes are moist  · Hearing grossly normal.     NECK: No visible masses  LUNGS:    · Respiratory effort normal.  · No visualized signs of difficulty breathing or respiratory distress  SKIN: warm and dry  · No significant exanthematous lesions or discoloration noted on facial skin  PSYCH:    · Alert and oriented, able to follow commands  · Normal reasoning, insight good  · Normal affect  · No memory disturbance noted  NEURO:   No Facial Asymmetry (Cranial nerve 7 motor function) (limited exam to video visit)      No gaze palsy      Assessment and Plan:      Diagnosis Orders   1. Acute cystitis without hematuria  ciprofloxacin (CIPRO) 500 MG tablet    phenazopyridine (PYRIDIUM) 200 MG tablet   Plan below. INSTRUCTIONS  Please finish taking ALL of the antibiotics. Get protime tomorrow and tell them about cipro. My need dose adjustment    Virtual Visit (video visit) encounter employed to address concerns as mentioned above. A caregiver was present when appropriate. Due to this being a TeleHealth encounter (During PDQNQ-78 public health emergency), evaluation of the following organ systems was limited. Pursuant to the emergency declaration under the [address] [place] [address] authority and the [employer] and [employer] Act, this Virtual Visit was conducted with patient's (and/or legal guardian's) consent, to reduce the patient's risk of exposure to COVID-19 and provide necessary medical care. The patient (and/or legal guardian) has also been advised to contact this office for worsening conditions or problems, and seek emergency medical treatment and/or call 911 if deemed necessary. Services were provided through a video synchronous discussion virtually to substitute for in-person clinic visit. Patient and provider were located at their individual homes. minutes: 5-10 minutes were spent on the digital evaluation and management of this patient. --Al Nowak MD on 7/1/2020 at 2:34 PM    An electronic signature was used to authenticate this note.

## 2020-07-02 ENCOUNTER — ANTI-COAG VISIT (OUTPATIENT)
Dept: PHARMACY | Age: 60
End: 2020-07-02
Payer: COMMERCIAL

## 2020-07-02 VITALS — TEMPERATURE: 96.8 F

## 2020-07-02 LAB — INTERNATIONAL NORMALIZATION RATIO, POC: 2.5

## 2020-07-02 PROCEDURE — 85610 PROTHROMBIN TIME: CPT

## 2020-07-02 PROCEDURE — 99211 OFF/OP EST MAY X REQ PHY/QHP: CPT

## 2020-07-02 NOTE — PROGRESS NOTES
Ms. Genaro Pemberton is a 56 y.o.  female with history of Heart Valve Replacement. Ms. Genaro Pemberton had an INR test today. Results were reviewed and appropriate warfarin management was completed. THIS VISIT WAS COMPLETED AS:   []   A VIRTUAL VISIT VIA TELEPHONE IN EFFORTS TO REDUCE THE SPREAD OF COVID-19.  []   A DRIVE-THRU VISIT IN EFFORTS TO REDUCE THE SPREAD OF COVID-19. [x]   AN IN PERSON VISIT. PROTOCOLS WERE FOLLOWED WITH PRECAUTIONS TO REDUCE THE SPREAD OF COVID-19. Patient verifies current dosing regimen  Patient denies s/s bleeding/bruising/swelling/SOB  No blood in urine or stool. No dietary changes. No change in alcohol use. No missed doses. No Procedures scheduled in the future at this time. Lab Results   Component Value Date    INR 2.5 07/02/2020    INR 2.38 (H) 06/12/2020    INR 1.99 (H) 05/22/2020       Pertinent findings: started a 7 day course of Cipro yesterday which can raise INR. We will reduce her dose for the next 6 days and then resume previous dosing. She will return in 2 weeks to ensure her dose returned to a therapeutic level. Warfarin dosing: Take warfarin 2.5mg daily EXCEPT 5mg on Saturday 7/4 and Tuesday 7/7.  After Cipro is finished resume Warfarin 5mg daily EXCEPT 2.5mg every Monday, Wednesday and Friday      Next INR: 7/17/20       Medications reviewed and updated on home medication list: No:   Warfarin dose reviewed and/or updated on the patient 's home medication list: No:        CLINICAL PHARMACY CONSULT: MED RECONCILIATION/REVIEW [address]    PHSO: No  Total # of Interventions Recommended: 1  - Decreased Dose #: 1  Total Interventions Accepted: 1  Time Spent (min): 15

## 2020-07-14 RX ORDER — AMOXICILLIN 500 MG/1
CAPSULE ORAL
Qty: 4 CAPSULE | Refills: 0 | Status: SHIPPED | OUTPATIENT
Start: 2020-07-14 | End: 2021-02-26 | Stop reason: SDUPTHER

## 2020-07-17 ENCOUNTER — ANTI-COAG VISIT (OUTPATIENT)
Dept: PHARMACY | Age: 60
End: 2020-07-17
Payer: COMMERCIAL

## 2020-07-17 VITALS — TEMPERATURE: 97.3 F

## 2020-07-17 LAB — INTERNATIONAL NORMALIZATION RATIO, POC: 3.6

## 2020-07-17 PROCEDURE — 85610 PROTHROMBIN TIME: CPT

## 2020-07-17 PROCEDURE — 99211 OFF/OP EST MAY X REQ PHY/QHP: CPT

## 2020-07-17 NOTE — PROGRESS NOTES
Ms. Winston Melo is a 56 y.o.  female. Ms. Winston Melo had an INR test today. Results were reviewed and appropriate warfarin management was completed. THIS VISIT WAS COMPLETED AS:   []    A VIRTUAL VISIT VIA TELEPHONE IN EFFORTS TO REDUCE THE SPREAD OF COVID-19.  []    A DRIVE-THRU VISIT IN EFFORTS TO REDUCE THE SPREAD OF COVID-19. [x]    AN IN PERSON VISIT. PROTOCOLS WERE FOLLOWED WITH PRECAUTIONS TO REDUCE THE SPREAD OF COVID-19. Patient verifies current dosing regimen: Yes     Medications reviewed and updated on the patient 's home medication list: No: no changes     Lab Results   Component Value Date    INR 3.6 07/17/2020    INR 2.5 07/02/2020    INR 2.38 (H) 06/12/2020       Patient Findings     Negatives:   Signs/symptoms of bleeding, Missed doses, Change in medications, Change in diet/appetite, Bruising    Comments:   Eats broccoli about 2-3 times per week but has cut back on it in the past week. Has agreed to continue eating 2-3 times per week. Anticoagulation Summary  As of 7/17/2020    INR goal:   2.5-3.5   TTR:   60.7 % (4.4 y)   INR used for dosing:   3.6! (7/17/2020)   Warfarin maintenance plan:   2.5 mg (5 mg x 0.5) every Mon, Wed, Fri; 5 mg (5 mg x 1) all other days   Weekly warfarin total:   27.5 mg   Plan last modified:   Kristyn Anand RPH (6/12/2020)   Next INR check:   8/13/2020   Priority:   Maintenance   Target end date:    Indefinite    Indications    Heart valve replaced (Resolved) [Z95.2]  Ascending aortic aneurysm ([address] (Resolved) [I71.2]             Anticoagulation Episode Summary     INR check location:       Preferred lab:       Send INR reminders to:   [facility]AFF    Comments:   EPIC ascending aortic aneurysm repair 2/2016          Anticoagulation Care Providers     Provider Role Specialty Phone number    Isabella Fowler MD Referring Cardiology [phone]    Quynh Garcia MD  Family Medicine [phone]          Warfarin plan: Her goal INR is 2.5-3.5 and she is 3.6 today. She wants to continue eating broccoli so we will keep her dose the same and follow up in 4 weeks. Reminded her to call with any medication changes especially antibiotics or steroids. Description    CONTINUE: Warfarin 5mg daily EXCEPT 2.5mg every Monday, Wednesday and Friday    Enjoy a small serving of greens (usually broccoli) three times a week. Call with medications changes, especially antibiotics and steroids and including any over-the-counter medications or herbal products. Call if you stop any medications.            CLINICAL PHARMACY CONSULT: MED RECONCILIATION/REVIEW ADDENDUM    For Pharmacy Admin Tracking Only    PHSO: No  Total # of Interventions Recommended: 0      Total Interventions Accepted: 0  Time Spent (min): 15

## 2020-08-13 ENCOUNTER — ANTI-COAG VISIT (OUTPATIENT)
Dept: PHARMACY | Age: 60
End: 2020-08-13
Payer: COMMERCIAL

## 2020-08-13 LAB — INTERNATIONAL NORMALIZATION RATIO, POC: 5.8

## 2020-08-13 PROCEDURE — 85610 PROTHROMBIN TIME: CPT

## 2020-08-13 PROCEDURE — 99211 OFF/OP EST MAY X REQ PHY/QHP: CPT

## 2020-08-20 ENCOUNTER — ANTI-COAG VISIT (OUTPATIENT)
Dept: PHARMACY | Age: 60
End: 2020-08-20
Payer: COMMERCIAL

## 2020-08-20 VITALS — TEMPERATURE: 97.2 F

## 2020-08-20 LAB — INTERNATIONAL NORMALIZATION RATIO, POC: 2.6

## 2020-08-20 PROCEDURE — 99211 OFF/OP EST MAY X REQ PHY/QHP: CPT

## 2020-08-20 PROCEDURE — 85610 PROTHROMBIN TIME: CPT

## 2020-08-20 NOTE — PROGRESS NOTES
Ms. Garrett Dobson is a 56 y.o.  female. Ms. Garrett Dobson had an INR test today. Results were reviewed and appropriate warfarin management was completed. THIS VISIT WAS COMPLETED AS:   []    A VIRTUAL VISIT VIA TELEPHONE IN EFFORTS TO REDUCE THE SPREAD OF COVID-19.  []    A DRIVE-THRU VISIT IN EFFORTS TO REDUCE THE SPREAD OF COVID-19. [x]    AN IN PERSON VISIT. PROTOCOLS WERE FOLLOWED WITH PRECAUTIONS TO REDUCE THE SPREAD OF COVID-19. Patient verifies current dosing regimen: Yes     Warfarin medication reviewed and updated on the patient 's home medication list: Yes   All other medications reviewed and updated on the patient 's home medication list: No: no new medications     Lab Results   Component Value Date    INR 2.6 2020    INR 5.8 2020    INR 3.6 2020       Patient Findings     Negatives:   Signs/symptoms of thrombosis, Signs/symptoms of bleeding, Laboratory test error suspected, Change in health, Change in alcohol use, Change in activity, Upcoming invasive procedure, Emergency department visit, Upcoming dental procedure, Missed doses, Extra doses, Change in medications, Change in diet/appetite, Hospital admission, Bruising, Other complaints          Anticoagulation Summary  As of 2020    INR goal:   2.5-3.5   TTR:   59.5 % (4.5 y)   INR used for dosin.6 (2020)   Warfarin maintenance plan:   2.5 mg (5 mg x 0.5) every Mon, Wed, Fri; 5 mg (5 mg x 1) all other days   Weekly warfarin total:   27.5 mg   Plan last modified:   Kristyn Anand, [address] [facility] (2020)   Next INR check:   2020   Priority:   Maintenance   Target end date:    Indefinite    Indications    Heart valve replaced (Resolved) [Z95.2]  Ascending aortic aneurysm ([facility]) (Resolved) [I71.2]             Anticoagulation Episode Summary     INR check location:       Preferred lab:       Send INR reminders to:   [facility] STAFF    Comments:   EPIC ascending aortic aneurysm repair 2/2016          Anticoagulation Care Providers     Provider Role Specialty Phone number    Beverley Ly MD Referring Cardiology [phone]    Albert Ormond, MD  Family Medicine [phone]          Warfarin plan:   Patient states doing well. No complaints regarding warfarin therapy. No bruising/bleeding. No change in medication or diet. She has been sprinkling ground flax seed meal onto her yogurt in the morning and although it has high amounts of omega-3, I do not think it would be enough to increase her INR as it did prior to this visit. Description    CONTINUE: Warfarin 5mg daily EXCEPT 2.5mg every Monday, Wednesday and Friday    Enjoy a small serving of greens (usually broccoli) three times a week. Call with medications changes, especially antibiotics and steroids and including any over-the-counter medications or herbal products. Call if you stop any medications. Reviewed AVS with patient / caregiver.       CLINICAL PHARMACY CONSULT: MED RECONCILIATION/REVIEW ADDENDUM    For Pharmacy Admin Tracking Only    PHSO: No  Total # of Interventions Recommended: 0    - Maintenance Safety Lab Monitoring #: 1  Total Interventions Accepted: 0  Time Spent (min): [facility] [place] 8/20/2020  9:21 AM

## 2020-09-15 ENCOUNTER — TELEPHONE (OUTPATIENT)
Dept: ADMINISTRATIVE | Age: 60
End: 2020-09-15

## 2020-09-18 ENCOUNTER — ANTI-COAG VISIT (OUTPATIENT)
Dept: PHARMACY | Age: 60
End: 2020-09-18
Payer: COMMERCIAL

## 2020-09-18 LAB — INR BLD: 3.7

## 2020-09-18 PROCEDURE — 85610 PROTHROMBIN TIME: CPT

## 2020-09-18 PROCEDURE — 99211 OFF/OP EST MAY X REQ PHY/QHP: CPT

## 2020-09-18 NOTE — PROGRESS NOTES
Agree with just holding dose rather than lowering regimen at this time given she was in goal range last visit. Counseled extensively on the consistency of vitamin K in her diet and how this will help stabilize her INR.  She will try adding a giovanna salad to her diet on a couple days

## 2020-09-18 NOTE — PROGRESS NOTES
Ms. Frida Porter is a 56 y.o.  female. Ms. Frida Porter had an INR test today. Results were reviewed and appropriate warfarin management was completed. THIS VISIT WAS COMPLETED AS:   []    A VIRTUAL VISIT VIA TELEPHONE IN EFFORTS TO REDUCE THE SPREAD OF COVID-19.  []    A DRIVE-THRU VISIT IN EFFORTS TO REDUCE THE SPREAD OF COVID-19. [x]    AN IN PERSON VISIT. PROTOCOLS WERE FOLLOWED WITH PRECAUTIONS TO REDUCE THE SPREAD OF COVID-19. Patient verifies current dosing regimen: Yes     Warfarin medication reviewed and updated on the patient 's home medication list: Yes   All other medications reviewed and updated on the patient 's home medication list: No: No changes      Lab Results   Component Value Date    INR 3.70 09/18/2020    INR 2.6 08/20/2020    INR 5.8 08/13/2020       Patient Findings     Negatives:   Signs/symptoms of bleeding, Missed doses, Change in medications, Change in diet/appetite, Bruising          Anticoagulation Summary  As of 9/18/2020    INR goal:   2.5-3.5   TTR:   59.9 % (4.5 y)   INR used for dosing:   3.70! (9/18/2020)   Warfarin maintenance plan:   2.5 mg (5 mg x 0.5) every Mon, Wed, Fri; 5 mg (5 mg x 1) all other days   Weekly warfarin total:   27.5 mg   Plan last modified:   Kristyn Anand, [facility] (6/12/2020)   Next INR check:   10/16/2020   Priority:   Maintenance   Target end date: Indefinite    Indications    Heart valve replaced (Resolved) [Z95.2]  Ascending aortic aneurysm ([address] (Resolved) [I71.2]             Anticoagulation Episode Summary     INR check location:   Anticoagulation Clinic    Preferred lab:       Send INR reminders to:   [address] [place] [address] 58    Comments:   EPIC ascending aortic aneurysm repair 2/2016          Anticoagulation Care Providers     Provider Role Specialty Phone number    Sylvia Laguna MD Referring Cardiology [phone]    Taryn Simpson MD  Family Medicine [phone]          Warfarin plan:   INR today above goal (3.7). Pt states she is doing well and is not experiencing any bruising or bleeding. She is eating more onions which I do not believe is attributing to her elevated INR. Instructed to hold warfarin today then continue same regimen indicated below. Scheduled f/u in 4 weeks. Description    TODAY (9/18) Hold warfarin, then   CONTINUE: Warfarin 5mg daily EXCEPT 2.5mg every Monday, Wednesday and Friday    Enjoy a small serving of greens (usually broccoli) three times a week. Call with medications changes, especially antibiotics and steroids and including any over-the-counter medications or herbal products. Call if you stop any medications. Reviewed AVS with patient / caregiver.       CLINICAL PHARMACY CONSULT: MED RECONCILIATION/REVIEW ADDENDUM    For Pharmacy Admin Tracking Only    PHSO: No  Total # of Interventions Recommended: 1  - Decreased Dose #: 1  Total Interventions Accepted: 1  Time Spent (min): 15

## 2020-09-21 RX ORDER — ATORVASTATIN CALCIUM 40 MG/1
TABLET, FILM COATED ORAL
Qty: 90 TABLET | Refills: 0 | Status: SHIPPED | OUTPATIENT
Start: 2020-09-21 | End: 2020-12-21

## 2020-10-16 ENCOUNTER — ANTI-COAG VISIT (OUTPATIENT)
Dept: PHARMACY | Age: 60
End: 2020-10-16
Payer: COMMERCIAL

## 2020-10-16 LAB — INTERNATIONAL NORMALIZATION RATIO, POC: 3.8

## 2020-10-16 PROCEDURE — 85610 PROTHROMBIN TIME: CPT

## 2020-10-16 PROCEDURE — 99211 OFF/OP EST MAY X REQ PHY/QHP: CPT

## 2020-10-16 NOTE — PROGRESS NOTES
Ms. Kelley Mcdaniel is a 56 y.o.  female with history of Heart Valve Replacement. Ms. Kelley Mcdaniel had an INR test today. Results were reviewed and appropriate warfarin management was completed. THIS VISIT WAS COMPLETED AS:   []   A VIRTUAL VISIT VIA TELEPHONE IN EFFORTS TO REDUCE THE SPREAD OF COVID-19.  []   A DRIVE-THRU VISIT IN EFFORTS TO REDUCE THE SPREAD OF COVID-19.  []   AN IN PERSON VISIT. PROTOCOLS WERE FOLLOWED WITH PRECAUTIONS TO REDUCE THE SPREAD OF COVID-19. Patient verifies current dosing regimen  Patient denies s/s bleeding/bruising/swelling/SOB  No blood in urine or stool. No dietary changes. No changes in medication/OTC agents/Herbals. No change in alcohol use. No missed doses. No Procedures scheduled in the future at this time.     Lab Results   Component Value Date    INR 3.8 10/16/2020    INR 3.70 09/18/2020    INR 2.6 08/20/2020       Pertinent findings: probably less greens in diet    Warfarin dosing: TODAY  Hold warfarin, then take 1/2 dose tomorrow(2.5mg) then  CONTINUE: Warfarin 5mg daily EXCEPT 2.5mg every Monday, Wednesday and Friday    Next INR: 11/13/20       Medications reviewed and updated on home medication list: Yes  Warfarin dose reviewed and/or updated on the patient 's home medication list: Yes       CLINICAL PHARMACY CONSULT: MED RECONCILIATION/REVIEW [address]  [address]. Tracking Only    PHSO: No  Total # of Interventions Recommended: 2  - Decreased Dose #: 1  - Maintenance Safety Lab Monitoring #: 1  Total Interventions Accepted: 2  Time Spent (min): 15

## 2020-11-12 ENCOUNTER — OFFICE VISIT (OUTPATIENT)
Dept: FAMILY MEDICINE CLINIC | Age: 60
End: 2020-11-12
Payer: COMMERCIAL

## 2020-11-12 VITALS
DIASTOLIC BLOOD PRESSURE: 68 MMHG | RESPIRATION RATE: 14 BRPM | WEIGHT: 208 LBS | OXYGEN SATURATION: 96 % | TEMPERATURE: 97 F | HEIGHT: 64 IN | BODY MASS INDEX: 35.51 KG/M2 | HEART RATE: 73 BPM | SYSTOLIC BLOOD PRESSURE: 112 MMHG

## 2020-11-12 DIAGNOSIS — E78.5 HYPERLIPIDEMIA WITH TARGET LDL LESS THAN 130: ICD-10-CM

## 2020-11-12 DIAGNOSIS — Z79.01 CHRONIC ANTICOAGULATION: ICD-10-CM

## 2020-11-12 DIAGNOSIS — I69.30 HISTORY OF CVA WITH RESIDUAL DEFICIT: ICD-10-CM

## 2020-11-12 LAB
A/G RATIO: 1.8 (ref 1.1–2.2)
ALBUMIN SERPL-MCNC: 4.1 G/DL (ref 3.4–5)
ALP BLD-CCNC: 85 U/L (ref 40–129)
ALT SERPL-CCNC: 20 U/L (ref 10–40)
ANION GAP SERPL CALCULATED.3IONS-SCNC: 11 MMOL/L (ref 3–16)
AST SERPL-CCNC: 22 U/L (ref 15–37)
BASOPHILS ABSOLUTE: 0.1 K/UL (ref 0–0.2)
BASOPHILS RELATIVE PERCENT: 1 %
BILIRUB SERPL-MCNC: 1.5 MG/DL (ref 0–1)
BUN BLDV-MCNC: 12 MG/DL (ref 7–20)
CALCIUM SERPL-MCNC: 8.9 MG/DL (ref 8.3–10.6)
CHLORIDE BLD-SCNC: 104 MMOL/L (ref 99–110)
CHOLESTEROL, TOTAL: 156 MG/DL (ref 0–199)
CO2: 26 MMOL/L (ref 21–32)
CREAT SERPL-MCNC: 0.7 MG/DL (ref 0.6–1.2)
EOSINOPHILS ABSOLUTE: 0.3 K/UL (ref 0–0.6)
EOSINOPHILS RELATIVE PERCENT: 5.2 %
GFR AFRICAN AMERICAN: >60
GFR NON-AFRICAN AMERICAN: >60
GLOBULIN: 2.3 G/DL
GLUCOSE BLD-MCNC: 88 MG/DL (ref 70–99)
HCT VFR BLD CALC: 45.5 % (ref 36–48)
HDLC SERPL-MCNC: 53 MG/DL (ref 40–60)
HEMOGLOBIN: 15 G/DL (ref 12–16)
LDL CHOLESTEROL CALCULATED: 76 MG/DL
LYMPHOCYTES ABSOLUTE: 1.6 K/UL (ref 1–5.1)
LYMPHOCYTES RELATIVE PERCENT: 24.4 %
MCH RBC QN AUTO: 29 PG (ref 26–34)
MCHC RBC AUTO-ENTMCNC: 32.9 G/DL (ref 31–36)
MCV RBC AUTO: 88.2 FL (ref 80–100)
MONOCYTES ABSOLUTE: 0.5 K/UL (ref 0–1.3)
MONOCYTES RELATIVE PERCENT: 7.3 %
NEUTROPHILS ABSOLUTE: 4 K/UL (ref 1.7–7.7)
NEUTROPHILS RELATIVE PERCENT: 62.1 %
PDW BLD-RTO: 14.3 % (ref 12.4–15.4)
PLATELET # BLD: 222 K/UL (ref 135–450)
PMV BLD AUTO: 10.2 FL (ref 5–10.5)
POTASSIUM SERPL-SCNC: 3.9 MMOL/L (ref 3.5–5.1)
RBC # BLD: 5.16 M/UL (ref 4–5.2)
SODIUM BLD-SCNC: 141 MMOL/L (ref 136–145)
TOTAL PROTEIN: 6.4 G/DL (ref 6.4–8.2)
TRIGL SERPL-MCNC: 133 MG/DL (ref 0–150)
VLDLC SERPL CALC-MCNC: 27 MG/DL
WBC # BLD: 6.4 K/UL (ref 4–11)

## 2020-11-12 PROCEDURE — 99396 PREV VISIT EST AGE 40-64: CPT | Performed by: FAMILY MEDICINE

## 2020-11-12 PROCEDURE — 90471 IMMUNIZATION ADMIN: CPT | Performed by: FAMILY MEDICINE

## 2020-11-12 PROCEDURE — 90686 IIV4 VACC NO PRSV 0.5 ML IM: CPT | Performed by: FAMILY MEDICINE

## 2020-11-12 NOTE — PROGRESS NOTES
PHYSICAL-VISIT NOTE   Subjective:     Chief Complaint   Patient presents with    Annual Exam       Surinder Paul is a 56 y.o. female who presents for annual testing/preventive review and check-up of medical problems listed below:  1. Well adult health check    2. History of CVA with residual deficit- 2010, vision change    3. 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 Chronic anticoagulation for AVR- INR 2.5-3.5    5. Hyperlipidemia with target LDL less than 130    6. Needs flu shot        Complaints: none    Working remotely    HISTORY:  Patient's medications, allergies, past medical, and social histories were reviewed and updated as appropriate. CHART REVIEW  Health Maintenance   Topic Date Due    Lipid screen  03/13/2020    Flu vaccine (1) 09/01/2020    Breast cancer screen  10/09/2021    Colon cancer screen colonoscopy  10/21/2021    Cervical cancer screen  04/12/2022    DTaP/Tdap/Td vaccine (3 - Td) 07/03/2025    Shingles Vaccine  Completed    Pneumococcal 0-64 years Vaccine  Completed    Hepatitis C screen  Completed    HIV screen  Completed    Hepatitis A vaccine  Aged Out    Hepatitis B vaccine  Aged Out    Hib vaccine  Aged Out    Meningococcal (ACWY) vaccine  Aged Out     The 10-year ASCVD risk score (Nery Rodriguez, et al., 2013) is: 2.6%    Values used to calculate the score:      Age: 56 years      Sex: Female      Is Non- : No      Diabetic: No      Tobacco smoker: No      Systolic Blood Pressure: 741 mmHg      Is BP treated: No      HDL Cholesterol: 48 mg/dL      Total Cholesterol: 136 mg/dL  Prior to Visit Medications    Medication Sig Taking? Authorizing Provider   atorvastatin (LIPITOR) 40 MG tablet TAKE 1 TABLET NIGHTLY *CALL FOR APPOINTMENT FOR PHYSICAL* Yes Chivo Mendoza MD   amoxicillin (AMOXIL) 500 MG capsule Take 4 capsules 30-60 minutes prior to dental appointment.  Yes Ryan Kay MD   warfarin (COUMADIN) 5 MG tablet Take 1 tablet by mouth daily EXCEPT 2.5mg every Monday, Wednesday and Friday or as directed by [id number] [facility] [place] Yes Annetta Reaves MD   metoprolol succinate (TOPROL XL) 25 MG extended release tablet TAKE 1 TABLET DAILY Yes Annetta Reaves MD   albuterol sulfate HFA (VENTOLIN HFA) 108 (90 Base) MCG/ACT inhaler Inhale 2 puffs into the lungs every 6 hours as needed for Wheezing Yes Singh Grande MD   cetirizine (ZYRTEC ALLERGY) 10 MG tablet Take 10 mg by mouth as needed  Yes Historical Provider, MD   fluticasone (FLONASE) 50 MCG/ACT nasal spray 2 sprays by Nasal route daily as needed  Yes Historical Provider, MD   aspirin 81 MG chewable tablet Take 81 mg by mouth nightly  Yes Historical Provider, MD      Family History   Problem Relation Age of Onset    High Blood Pressure Father     Heart Failure Father     Coronary Art Dis Father         MI - nonsmoker ; PPM and defibrillator     Mult Sclerosis Father     Cancer Mother         lymphoma    Mult Sclerosis Sister     Diabetes Maternal Grandfather      Social History     Tobacco Use    Smoking status: Former Smoker     Packs/day: 1.00     Years: 21.00     Pack years: 21.00     Last attempt to quit: 1999     Years since quittin.8    Smokeless tobacco: Never Used    Tobacco comment: started age 23   Substance Use Topics    Alcohol use:  Yes     Alcohol/week: 1.0 standard drinks     Types: 1 Glasses of wine per week     Comment: 1 per month     Drug use: No     Comment: never      LAST LABS  Cholesterol, Total   Date Value Ref Range Status   2019 136 0 - 199 mg/dL Final     LDL Calculated   Date Value Ref Range Status   2019 57 <100 mg/dL Final     HDL   Date Value Ref Range Status   2019 48 40 - 60 mg/dL Final     Triglycerides   Date Value Ref Range Status   2019 157 (H) 0 - 150 mg/dL Final     Lab Results   Component Value Date    GLUCOSE 89 2019     Lab Results   Component Value Date     (H) 03/13/2019    K 4.4 03/13/2019    CREATININE 0.7 03/13/2019     Lab Results   Component Value Date    WBC 6.8 03/13/2019    HGB 14.2 03/13/2019    HCT 43.4 03/13/2019    MCV 87.6 03/13/2019     03/13/2019     Lab Results   Component Value Date    ALT 22 03/13/2019    AST 24 03/13/2019    ALKPHOS 70 03/13/2019    BILITOT 1.2 (H) 03/13/2019     TSH (uIU/mL)   Date Value   09/07/2017 2.50     Lab Results   Component Value Date    LABA1C 5.3 03/13/2019     Objective:   PHYSICAL EXAM   /68   Pulse 73   Temp 97 °F (36.1 °C)   Resp 14   Ht 5' 4\" (1.626 m)   Wt 208 lb (94.3 kg)   LMP 02/09/2006   SpO2 96%   BMI 35.70 kg/m²   BP Readings from Last 5 Encounters:   11/12/20 112/68   12/10/19 128/60   04/12/19 110/70   03/08/19 116/72   12/11/18 124/76     Wt Readings from Last 5 Encounters:   11/12/20 208 lb (94.3 kg)   12/10/19 209 lb (94.8 kg)   04/12/19 206 lb (93.4 kg)   03/08/19 205 lb (93 kg)   12/11/18 206 lb (93.4 kg)      GENERAL:   · well-developed, well-nourished, alert, no distress. EYES:   · External findings: lids and lashes normal and conjunctivae and sclerae normal  · Eyes: no periorbital cellulitis. ENT:   · External nose and ears appear normal  · normal TM's and external ear canals both ears  · Pharynx: normal. Exudates: None  · Lips, mucosa, and tongue normal  · Hearing grossly normal.     NECK:   · Supple, symmetrical, trachea midline  · Thyroid not enlarged, symmetric, no tenderness/mass/nodules  LYMPH:  · no cervical nodes, no supraclavicular nodes  LUNGS:    · Breathing unlabored  · clear to auscultation bilaterally and good air movement  CARDIOVASC:   · regular rate and rhythm, S1, S2 normal. No murmur, click, rub or gallop  · audible from across room mechanical AVR  · Apical impulse normal  · LEGS:  Lower extremity edema: none    · No carotid bruits  ABDOMEN:   · Soft, non-tender, no masses  · No hepatosplenomegaly  · No hernias noted.   Exam limited by N/A  SKIN: warm and dry  · No rashes or suspicious lesions  · No nodules or induration  PSYCH:    · Alert and oriented  · Normal reasoning, insight good  · Facial expressions full, mood appropriate  · No memory disturbance noted  MUSCULOSKEL:    · Gait normal, assistive device: none  · No significant finger or nail findings  · Spine symmetric, no deformities, no kyphosis      Assessment and Plan:      Diagnosis Orders   1. Well adult health check     2. History of CVA with residual deficit- 2010, vision change  Comprehensive Metabolic Panel   3. 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 Chronic anticoagulation for AVR- INR 2.5-3.5  CBC Auto Differential   5. Hyperlipidemia with target LDL less than 130  LIPID PANEL    Comprehensive Metabolic Panel   6. Needs flu shot  INFLUENZA, QUADV, 3 YRS AND OLDER, IM PF, PREFILL SYR OR SDV, 0.5ML (AFLURIA QUADV, PF)   Stable. Plan as above and below. INSTRUCTIONS  NEXT APPOINTMENT: Please schedule fasting annual physical (30 minutes) in one year. OK to have water, black coffee and medications (except for diabetes medicines). · PLEASE TAKE THIS FORM TO CHECK-OUT WINDOW TO SCHEDULE NEXT VISIT. · PLEASE GET BLOODWORK DRAWN TODAY ON FIRST FLOOR in 170. Take orders with you. RESULTS- most blood tests back in couple days. We will call you if any problems. If bloodwork good, you will get letter in mail or notified thru [address] (if signed up) within 2 weeks. If you do not, please call office.    ·

## 2020-11-12 NOTE — PATIENT INSTRUCTIONS
INSTRUCTIONS  NEXT APPOINTMENT: Please schedule fasting annual physical (30 minutes) in one year. OK to have water, black coffee and medications (except for diabetes medicines). · PLEASE TAKE THIS FORM TO CHECK-OUT WINDOW TO SCHEDULE NEXT VISIT. · PLEASE GET BLOODWORK DRAWN TODAY ON FIRST FLOOR in 170. Take orders with you. RESULTS- most blood tests back in couple days. We will call you if any problems. If bloodwork good, you will get letter in mail or notified thru [address] (if signed up) within 2 weeks. If you do not, please call office.    Patient Education

## 2020-11-13 ENCOUNTER — ANTI-COAG VISIT (OUTPATIENT)
Dept: PHARMACY | Age: 60
End: 2020-11-13
Payer: COMMERCIAL

## 2020-11-13 VITALS — TEMPERATURE: 96.8 F

## 2020-11-13 LAB — INR BLD: 3.7

## 2020-11-13 PROCEDURE — 99211 OFF/OP EST MAY X REQ PHY/QHP: CPT

## 2020-11-13 PROCEDURE — 85610 PROTHROMBIN TIME: CPT

## 2020-11-13 NOTE — PROGRESS NOTES
Ms. Leo Washington is a 56 y.o.  female. Ms. Leo Washington had an INR test today. Results were reviewed and appropriate warfarin management was completed. THIS VISIT WAS COMPLETED AS:   []    A VIRTUAL VISIT VIA TELEPHONE IN EFFORTS TO REDUCE THE SPREAD OF COVID-19.  []    A DRIVE-THRU VISIT IN EFFORTS TO REDUCE THE SPREAD OF COVID-19. [x]    AN IN PERSON VISIT. PROTOCOLS WERE FOLLOWED WITH PRECAUTIONS TO REDUCE THE SPREAD OF COVID-19. Patient verifies current dosing regimen: Yes     Warfarin medication reviewed and updated on the patient 's home medication list: Yes   All other medications reviewed and updated on the patient 's home medication list: No: no changes     Lab Results   Component Value Date    INR 3.70 11/13/2020    INR 3.8 10/16/2020    INR 3.70 09/18/2020       Patient Findings     Negatives:   Signs/symptoms of bleeding, Change in medications, Change in diet/appetite, Bruising          Anticoagulation Summary  As of 11/13/2020    INR goal:   2.5-3.5   TTR:   58.0 % (4.7 y)   INR used for dosing:   3.70! (11/13/2020)   Warfarin maintenance plan:   2.5 mg (5 mg x 0.5) every Mon, Wed, Fri; 5 mg (5 mg x 1) all other days   Weekly warfarin total:   27.5 mg   Plan last modified:   Tamara Rios (11/13/2020)   Next INR check:   12/11/2020   Priority:   Maintenance   Target end date:    Indefinite    Indications    Heart valve replaced (Resolved) [Z95.2]  Ascending aortic aneurysm ([address] (Resolved) [I71.2]             Anticoagulation Episode Summary     INR check location:   Anticoagulation Clinic    Preferred lab:       Send INR reminders to:   [address] [facility]    Comments:   EPIC ascending aortic aneurysm repair 2/2016          Anticoagulation Care Providers     Provider Role Specialty Phone number    Akhil Joseph MD Referring Cardiology [phone]    Rosario Ham MD  Family Medicine [phone]          Warfarin plan:     Patient states she is doing well and reports no bruising or bleeding. Denies missed doses and reports no changes to her medications or diet. Her INR was 3.7 today, which is above her goal of 2.5-3.5. She has been above her INR goal for the last couple of visits. We will hold her warfarin dose today, then resume her previous dosing regimen. We will recheck her INR in 4 weeks. She plans to start eating spinach (in salad 2-3x/week or cooked 1x/week) instead of decreasing her weekly warfarin dose. If her INR is still high at her next visit, we may need to provide a prescription for the 2.5mg tablets to allow for smaller dose adjustments. Description    TODAY Hold warfarin, then  CONTINUE: Warfarin 5mg daily EXCEPT 2.5mg every Monday, Wednesday and Friday    Enjoy a small serving of greens (usually broccoli) three times a week. Start adding spinach to your salads 2-3 times times per week or eat cooked spinach once a week    Call with medications changes, especially antibiotics and steroids and including any over-the-counter medications or herbal products. Call if you stop any medications. Immunization History   Administered Date(s) Administered    Influenza Virus Vaccine 10/01/2016, 10/09/2018    Influenza, Intradermal, Preservative free 09/14/2012, 11/20/2015    Influenza, Intradermal, Quadrivalent, Preservative Free 10/04/2017    Influenza, Quadv, IM, PF (6 mo and older Fluzone, Flulaval, Fluarix, and 3 yrs and older Afluria) 11/12/2020    Pneumococcal Polysaccharide ([id number]) 09/18/2015    Polio IPV (IPOL) 09/03/2009    Tdap (Boostrix, Adacel) 09/03/2009, 07/03/2015    Zoster Live (Zostavax) 09/07/2017    Zoster Recombinant (Shingrix) 08/31/2018, 04/12/2019       Immunization history reviewed and updated:  Yes   Influenza vaccine given:  No: patient received 11-12-20     Reviewed AVS with patient / caregiver.       CLINICAL PHARMACY CONSULT: MED RECONCILIATION/REVIEW ADDENDUM    For Pharmacy Admin Tracking Only    PHSO:

## 2020-11-16 NOTE — PATIENT INSTRUCTIONS
Patient Education        DASH Diet: Care Instructions  Your Care Instructions     The DASH diet is an eating plan that can help lower your blood pressure. DASH stands for Dietary Approaches to Stop Hypertension. Hypertension is high blood pressure. The DASH diet focuses on eating foods that are high in calcium, potassium, and magnesium. These nutrients can lower blood pressure. The foods that are highest in these nutrients are fruits, vegetables, low-fat dairy products, nuts, seeds, and legumes. But taking calcium, potassium, and magnesium supplements instead of eating foods that are high in those nutrients does not have the same effect. The DASH diet also includes whole grains, fish, and poultry. The DASH diet is one of several lifestyle changes your doctor may recommend to lower your high blood pressure. Your doctor may also want you to decrease the amount of sodium in your diet. Lowering sodium while following the DASH diet can lower blood pressure even further than just the DASH diet alone. Follow-up care is a key part of your treatment and safety. Be sure to make and go to all appointments, and call your doctor if you are having problems. It's also a good idea to know your test results and keep a list of the medicines you take. How can you care for yourself at home? Following the DASH diet  · Eat 4 to 5 servings of fruit each day. A serving is 1 medium-sized piece of fruit, ½ cup chopped or canned fruit, 1/4 cup dried fruit, or 4 ounces (½ cup) of fruit juice. Choose fruit more often than fruit juice. · Eat 4 to 5 servings of vegetables each day. A serving is 1 cup of lettuce or raw leafy vegetables, ½ cup of chopped or cooked vegetables, or 4 ounces (½ cup) of vegetable juice. Choose vegetables more often than vegetable juice. · Get 2 to 3 servings of low-fat and fat-free dairy each day. A serving is 8 ounces of milk, 1 cup of yogurt, or 1 ½ ounces of cheese. · Eat 6 to 8 servings of grains each day. A serving is 1 slice of bread, 1 ounce of dry cereal, or ½ cup of cooked rice, pasta, or cooked cereal. Try to choose whole-grain products as much as possible. · Limit lean meat, poultry, and fish to 2 servings each day. A serving is 3 ounces, about the size of a deck of cards. · Eat 4 to 5 servings of nuts, seeds, and legumes (cooked dried beans, lentils, and split peas) each week. A serving is 1/3 cup of nuts, 2 tablespoons of seeds, or ½ cup of cooked beans or peas. · Limit fats and oils to 2 to 3 servings each day. A serving is 1 teaspoon of vegetable oil or 2 tablespoons of salad dressing. · Limit sweets and added sugars to 5 servings or less a week. A serving is 1 tablespoon jelly or jam, ½ cup sorbet, or 1 cup of lemonade. · Eat less than 2,300 milligrams (mg) of sodium a day. If you limit your sodium to 1,500 mg a day, you can lower your blood pressure even more. Tips for success  · Start small. Do not try to make dramatic changes to your diet all at once. You might feel that you are missing out on your favorite foods and then be more likely to not follow the plan. Make small changes, and stick with them. Once those changes become habit, add a few more changes. · Try some of the following:  ? Make it a goal to eat a fruit or vegetable at every meal and at snacks. This will make it easy to get the recommended amount of fruits and vegetables each day. ? Try yogurt topped with fruit and nuts for a snack or healthy dessert. ? Add lettuce, tomato, cucumber, and onion to sandwiches. ? Combine a ready-made pizza crust with low-fat mozzarella cheese and lots of vegetable toppings. Try using tomatoes, squash, spinach, broccoli, carrots, cauliflower, and onions. ? Have a variety of cut-up vegetables with a low-fat dip as an appetizer instead of chips and dip. ? Sprinkle sunflower seeds or chopped almonds over salads. Or try adding chopped walnuts or almonds to cooked vegetables.   ? Try some vegetarian meals using beans and peas. Add garbanzo or kidney beans to salads. Make burritos and tacos with mashed brooks beans or black beans. Where can you learn more? Go to https://chsarikaeb.[employer]. org and sign in to your [employer] account. Enter C965 in the [employer] box to learn more about \"DASH Diet: Care Instructions. \"     If you do not have an account, please click on the \"Sign Up Now\" link. Current as of: December 16, 2019               Content Version: 12.6  © 5659-0111 [employer]. Care instructions adapted under license by [facility] ([facility]). If you have questions about a medical condition or this instruction, always ask your healthcare professional. [address] any warranty or liability for your use of this information.

## 2020-11-16 NOTE — PROGRESS NOTES
[facility]      Cardiology Progress Note    Surinder Paul  1960 November 17, 2020     Primary care physician: Chivo Mendoza MD  Reason for Visit: Aortic stenosis and aneurysm repair    CC: \"Feeling good. \"    HPI:  The patient is 56 y.o. 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 Originally, she was referred based on an abnormal echocardiogram. She was seen by her PCP for her annual check up and an echocardiogram was ordered to follow up on a heart murmur. The work-up showed a bicuspid aortic valve with severe stenosis and a severely dilated ascending aorta. She underwent a Bentall procedure on 2/16/16 at [facility] with Dr. Loida Diaz. She had a syncopal episode of uncertain etiology in 3/8062 but denies recurrence. Today, she states overall she is doing well. She denies any new cardiac complaints and states she continues to remain active without any exertional symptoms. She denies any chest pains or worsening shortness of breath. She is following with the coumadin clinic to manage her PT/INR. She reports compliance with her medications and tolerating. She reports chronic LE edema that is unchanged. She denies excessive bruising or unusual bleeding. Patient denies exertional chest pain/pressure, dyspnea at rest, worsening NICHOLS, PND, orthopnea, palpitations, lightheadedness, weight changes, changes in LE edema, and syncope.     Past Medical History:   Diagnosis Date    Allergic rhinitis     Aortic stenosis 2016    repaireed with valve replacement    Ascending aortic aneurysm ([address] 2016    Asthma     exercise induced-not current no meds    Cerebral aneurysm     clipping    CVA (cerebral vascular accident) ([address] 8/2010    echo () - \"hole in heart\"-no residual    Dissection of vertebral artery ([address] 2010    GERD (gastroesophageal reflux disease)     Hypercholesteremia     Murmur     since childhood--repaired with valve replacement    Paralytic strabismus, third or oculomotor nerve palsy, total     after CVA    Shingles     Tinnitus     Vertigo     mild    Wears glasses     Zoster 2012    left costal margin     Past Surgical History:   Procedure Laterality Date    AORTIC VALVE REPLACEMENT  16    +asc aortic aneurysm repair - 21mm [address] valved conduit [place] [place])    ARTERIAL ANEURYSM REPAIR      craniotomy. internal carotid artery. clipped.  WISDOM TOOTH EXTRACTION       Family History   Problem Relation Age of Onset    High Blood Pressure Father     Heart Failure Father     Coronary Art Dis Father         MI - nonsmoker ; PPM and defibrillator     Mult Sclerosis Father     Cancer Mother         lymphoma    Mult Sclerosis Sister     Diabetes Maternal Grandfather      Social History     Tobacco Use    Smoking status: Former Smoker     Packs/day: 1.00     Years: 21.00     Pack years: 21.00     Last attempt to quit: 1999     Years since quittin.8    Smokeless tobacco: Never Used    Tobacco comment: started age 23   Substance Use Topics    Alcohol use: Yes     Alcohol/week: 1.0 standard drinks     Types: 1 Glasses of wine per week     Comment: 1 per month     Drug use: No     Comment: never     Allergies   Allergen Reactions    Dye [Iodides] Rash     Current Outpatient Medications   Medication Sig Dispense Refill    atorvastatin (LIPITOR) 40 MG tablet TAKE 1 TABLET NIGHTLY *CALL FOR APPOINTMENT FOR PHYSICAL* 90 tablet 0    amoxicillin (AMOXIL) 500 MG capsule Take 4 capsules 30-60 minutes prior to dental appointment.  4 capsule 0    warfarin (COUMADIN) 5 MG tablet Take 1 tablet by mouth daily EXCEPT 2.5mg every Monday, Wednesday and Friday or as directed by [facility] [phone] 90 tablet 3    metoprolol succinate (TOPROL XL) 25 MG extended release tablet TAKE 1 TABLET DAILY 90 tablet 4    albuterol sulfate HFA (VENTOLIN HFA) 108 (90 Base) MCG/ACT inhaler Inhale 2 puffs into the lungs every 6 hours as needed for Wheezing 1 Inhaler 3    cetirizine (ZYRTEC ALLERGY) 10 MG tablet Take 10 mg by mouth as needed       fluticasone (FLONASE) 50 MCG/ACT nasal spray 2 sprays by Nasal route daily as needed       aspirin 81 MG chewable tablet Take 81 mg by mouth nightly        No current facility-administered medications for this visit. Review of Systems:  · Constitutional: no unanticipated weight loss. There's been no change in energy level, sleep pattern, or activity level. No fevers, chills. · Eyes: No visual changes or diplopia. No scleral icterus. · ENT: No Headaches, hearing loss or vertigo. No mouth sores or sore throat. · Cardiovascular: as reviewed in HPI  · Respiratory: No cough or wheezing, no sputum production. No hematemesis. · Gastrointestinal: No abdominal pain, appetite loss, blood in stools. No change in bowel or bladder habits. · Genitourinary: No dysuria, trouble voiding, or hematuria. · Musculoskeletal:  No gait disturbance, no joint complaints. · Integumentary: No rash or pruritis. · Neurological: No headache, diplopia, change in muscle strength, numbness or tingling. · Psychiatric: No anxiety or depression. · Endocrine: No temperature intolerance. No excessive thirst, fluid intake, or urination. No tremor. · Hematologic/Lymphatic: No abnormal bruising or bleeding, blood clots or swollen lymph nodes. Allergic/Immunologic: No nasal congestion or hives.     Physical Exam:   /62 (Site: Left Upper Arm, Position: Sitting, Cuff Size: Medium Adult)   Pulse 80   Temp 98.4 °F (36.9 °C)   Ht 5' 4\" (1.626 m)   Wt 208 lb 6.4 oz (94.5 kg) Comment: with shoes  LMP 02/09/2006   SpO2 98%   BMI 35.77 kg/m²   Wt Readings from Last 3 Encounters:   11/17/20 208 lb 6.4 oz (94.5 kg)   11/12/20 208 lb (94.3 kg)   12/10/19 209 lb (94.8 kg)     Constitutional: She is oriented to person, place, and time. She appears well-developed and well-nourished. In no acute distress. Head: Normocephalic and atraumatic. Pupils equal and round. Neck: Neck supple. No JVP or carotid bruit appreciated. No mass and no thyromegaly present. No lymphadenopathy present. Cardiovascular: Normal rate. Exam reveals no gallop and no friction rub. Mechanical heart sounds. Pulmonary/Chest: Effort normal and breath sounds normal. No respiratory distress. She has no wheezes, rhonchi or rales. Abdominal: Soft, non-tender. Bowel sounds are normal. She exhibits no organomegaly, mass or bruit. Extremities: Trace - 1+ BLE edema. No cyanosis or clubbing. Pulses are 2+ radial/dorsalis pedis/posterior tibial/carotid bilaterally. Neurological: No gross cranial nerve deficit. Coordination normal.   Skin: Skin is warm and dry. There is no rash or diaphoresis. Psychiatric: She has a normal mood and affect. Her speech is normal and behavior is normal.     Lab Review:   Lab Results   Component Value Date    TRIG 133 11/12/2020    HDL 53 11/12/2020    LDLCALC 76 11/12/2020    LABVLDL 27 11/12/2020       Lab Results   Component Value Date    BUN 12 11/12/2020    CREATININE 0.7 11/12/2020     EKG Interpretation:   12/2/15 Sinus rhythm. Incomplete right bundle branch block and anterior fascicular block. Left atrial enlargement. Cannot rule out inferior infarct. 3/29/2016 Sinus rhythm. Nonspecific T wave abnormality. 12/10/19 Sinus bradycardia. Incomplete RBBB. Nonspecific T wave abnormality. 11/17/20 Sinus rhythm. Incomplete right bundle branch block. Left atrial enlargement. Image Review:     10/23/15: Echo with bubble study [facility])  Left ventricle: The cavity size was normal. There was mild concentric hypertrophy. Systolic function was normal. The estimated ejection fraction was in the range of 60% to 65%. Wall motion was normal; there were no regional wall motion abnormalities.  Doppler parameters are consistent with abnormal left ventricular relaxation (grade 1  diastolic dysfunction). Global longitudinal strain is -17.6%. Aortic valve: Cusp separation was reduced. Transvalvular velocity was increased, due to stenosis. There was severe stenosis. Valve area: 0.68cm^2(VTI). Valve area: 0.69cm^2 (Vmax). Mean gradient: 32mmHg. Peak gradient: 62mmHg. Ascending aorta: The ascending aorta was dilated. 4/15/16 Echo (post surgery)  Normal LV size and systolic function: EF is 51%. Grade I diastolic dysfunction. A mechanical aortic valve appears well seated with a maximum gradient of 20 mmHg and a mean gradient of 12 mmHg. Trivial aortic regurgitation is present. The aortic root is normal in size. The aorta is within normal limits. The ascending aorta is normal.  Normal right ventricular size. No evidence of tricuspid regurgitation. CT chest 6/2016  Status post repair of previously demonstrated large ascending aortic   aneurysm, with normal caliber on the current exam.  Mild adjacent soft tissue   thickening is likely postoperative. Moderate hiatal hernia. 2.1 cm splenic artery aneurysm. Emphysema.  A 2 mm lingular pulmonary nodule is new as compared to prior, for   which follow-up recommendations are as below. 2/2016 Angiogram  1. Normal right dominant coronary arteries with no evidence of atherosclerotic disease. 2. Normal left ventricular systolic function with an elevated ejection fraction of 60%. 3. Severe aortic stenosis with a mean gradient of 50.9 mmHg. The valve is heavily calcified. Event monitor 5/2016  No sustained arrhythmia. 12 beat run of PSVT. CT chest 6/16/17  Status post repair of previously demonstrated large ascending aortic   aneurysm, with normal caliber on the current exam.  Mild adjacent soft tissue   thickening is likely postoperative. Moderate hiatal hernia. 2.1 cm splenic artery aneurysm.    Emphysema.  A 2 mm lingular pulmonary nodule is new as compared to prior, for   which follow-up recommendations are as below. Echo 10/2018  Normal LV size and systolic function. Estimated ejection fraction is 60%. The left atrium is normal in size. A mechanical artificial aortic valve appears well seated with a a mean gradient of 15 mmHg. No evidence of aortic valve regurgitation. Normal right ventricular size and function. Trivial tricuspid regurgitation. Assessment/Plan:     1) Aortic stenosis secondary to bicuspid valve s/p AVR 2/16/16. Echocardiogram EF 60%. Mechanical valve well seated. Asymptomatic. Will periodically repeat an echo (last one was 10/2018). Continue warfarin and ASA. Warfarin managed by the coumadin clinic. Instructed to call with any worsening symptoms. 2) Ascending aortic aneurysm s/p Bentall procedure. Repeat CT chest (6/2017) shows normal caliber aorta. No acute intervention. 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 Currently denies any new neurologic symptoms. 4) Essential hypertension. Goal BP <120/80 with aneurysm. Continue medical therapy. 5) Hyperlipidemia. Continue high intensity statin. 6) COPD. Chest CT shows emphysema. She denies dyspnea. 7) Obesity. BMI 35.7. Encouraged weight loss and increase aerobic exercise as tolerated. Follow up in 1 year. Thank you very much for allowing me to participate in the care of your patient. Please do not hesitate to contact me if you have any questions. Sincerely,  Irlanda Cameron. Princess Stone, [address] [place] [address] Tony Hightower [place] [address]  Ph: [phone]  Fax: [phone]    This note was scribed in the presence of Dr Princess Bertha MD by Saranya Jensen RN. Physician Attestation: The scribes documentation has been prepared under my direction and personally reviewed by me in its entirety.  I confirm that the note above accurately reflects all work, treatment, procedures, and medical decision making performed by me. All portions of the note including but not limited to the chief complaint, history of present illness, physical exam, assessment and plan/medical decision making were personally reviewed, edited, and updated on the day of the visit.

## 2020-11-17 ENCOUNTER — OFFICE VISIT (OUTPATIENT)
Dept: CARDIOLOGY CLINIC | Age: 60
End: 2020-11-17
Payer: COMMERCIAL

## 2020-11-17 VITALS
HEART RATE: 80 BPM | TEMPERATURE: 98.4 F | HEIGHT: 64 IN | SYSTOLIC BLOOD PRESSURE: 118 MMHG | DIASTOLIC BLOOD PRESSURE: 62 MMHG | WEIGHT: 208.4 LBS | BODY MASS INDEX: 35.58 KG/M2 | OXYGEN SATURATION: 98 %

## 2020-11-17 PROCEDURE — 99214 OFFICE O/P EST MOD 30 MIN: CPT | Performed by: INTERNAL MEDICINE

## 2020-11-17 PROCEDURE — 93000 ELECTROCARDIOGRAM COMPLETE: CPT | Performed by: INTERNAL MEDICINE

## 2020-12-11 ENCOUNTER — ANTI-COAG VISIT (OUTPATIENT)
Dept: PHARMACY | Age: 60
End: 2020-12-11
Payer: COMMERCIAL

## 2020-12-11 VITALS — TEMPERATURE: 96.8 F

## 2020-12-11 LAB — INTERNATIONAL NORMALIZATION RATIO, POC: 4

## 2020-12-11 PROCEDURE — 85610 PROTHROMBIN TIME: CPT

## 2020-12-11 PROCEDURE — 99211 OFF/OP EST MAY X REQ PHY/QHP: CPT

## 2020-12-11 RX ORDER — WARFARIN SODIUM 2.5 MG/1
2.5 TABLET ORAL DAILY
Qty: 135 TABLET | Refills: 3 | Status: SHIPPED | OUTPATIENT
Start: 2020-12-11 | End: 2021-01-29 | Stop reason: SDUPTHER

## 2020-12-11 NOTE — PROGRESS NOTES
Ms. Yee Jimenez is a 56 y.o.  female. Ms. Yee Jimenez had an INR test today. Results were reviewed and appropriate warfarin management was completed. THIS VISIT WAS COMPLETED AS:   []    A VIRTUAL VISIT VIA TELEPHONE IN EFFORTS TO REDUCE THE SPREAD OF COVID-19.  []    A DRIVE-THRU VISIT IN EFFORTS TO REDUCE THE SPREAD OF COVID-19. [x]    AN IN PERSON VISIT. PROTOCOLS WERE FOLLOWED WITH PRECAUTIONS TO REDUCE THE SPREAD OF COVID-19. Patient verifies current dosing regimen: Yes     Warfarin medication reviewed and updated on the patient 's home medication list: Yes   All other medications reviewed and updated on the patient 's home medication list: Yes     Lab Results   Component Value Date    INR 4 2020    INR 3.70 2020    INR 3.8 10/16/2020           Anticoagulation Summary  As of 2020    INR goal:   2.5-3.5   TTR:   57.0 % (4.8 y)   INR used for dosin! (2020)   Warfarin maintenance plan:   5 mg (5 mg x 1) every Mon, Wed, Fri; 2.5 mg (5 mg x 0.5) all other days   Weekly warfarin total:   25 mg   Plan last modified:   Gustabo Mohs, [facility] (2020)   Next INR check:   2020   Priority:   Maintenance   Target end date: Indefinite    Indications    Heart valve replaced (Resolved) [Z95.2]  Ascending aortic aneurysm ([address] (Resolved) [I71.2]             Anticoagulation Episode Summary     INR check location:   Anticoagulation Clinic    Preferred lab:       Send INR reminders to:   [address] [place] [address] 60    Comments:   EPIC ascending aortic aneurysm repair 2016          Anticoagulation Care Providers     Provider Role Specialty Phone number    Annetta Reaves MD Referring Cardiology [phone]    Singh Grande MD  Family Medicine [phone]          Warfarin plan:   INR remains elevated this visit    She believes she is doing everything she can with vitamin K intake and has consistently gotten in her servings.      Other than decreased activity, cannot pinpoint why her INR is not going down. She states she walked at the park in the warmer months and is not doing it as much now. Talked about ways to enjoy utilizing her treadmill more that she admittedly does not use. May begin to use it twice weekly for 20 minutes    With her INR still running high, we will lower her dose this week to 25 mg for now. She will be due for a new refill on warfarin soon, so I sent in a new Rx for 2.5 mg tablets to allow for smaller dose changes in the future in case this drops her INR too far. Tracking board not updated to 2.5 mg yet because she still has plenty of 5 mg tabs to get her through to the next appt in 3 weeks. Update the tracking board then to reflect new 2.5 mg tabs     Description    TODAY Hold warfarin, then   NEW dose: Warfarin 2.5 mg daily EXCEPT 5 mg every Monday, Wednesday and Friday    Enjoy a small serving of greens (usually broccoli) three times a week. Start adding spinach to your salads 2-3 times times per week or eat cooked spinach once a week    Call with medications changes, especially antibiotics and steroids and including any over-the-counter medications or herbal products. Call if you stop any medications.          Immunization History   Administered Date(s) Administered    Influenza Virus Vaccine 10/01/2016, 10/09/2018    Influenza, Intradermal, Preservative free 09/14/2012, 11/20/2015    Influenza, Intradermal, Quadrivalent, Preservative Free 10/04/2017    Influenza, Quadv, IM, PF (6 mo and older Fluzone, Flulaval, Fluarix, and 3 yrs and older Afluria) 11/12/2020    Pneumococcal Polysaccharide ([id number]) 09/18/2015    Polio IPV (IPOL) 09/03/2009    Tdap (Boostrix, Adacel) 09/03/2009, 07/03/2015    Zoster Live (Zostavax) 09/07/2017    Zoster Recombinant (Shingrix) 08/31/2018, 04/12/2019       Immunization history reviewed and updated:  Yes   Influenza vaccine given:  No: recd     Reviewed AVS with patient / caregiver. CLINICAL PHARMACY CONSULT: MED RECONCILIATION/REVIEW ADDENDUM    For Pharmacy Admin Tracking Only    PHSO (orange banner): No  Total # of Interventions Recommended (warfarin changes): 2  (warfarin changes) - Decreased Dose #: 1  - Updated Order #: 1 Updated Order Reason(s): Other  (other medication updates)- Updated Order #: 0 Updated Order Reason(s):   (#1 for reviewing INR) - Maintenance Safety Lab Monitoring #: 1  Total Interventions Accepted (warfarin related):  2  Time Spent (min) (round up): 15

## 2020-12-21 RX ORDER — ATORVASTATIN CALCIUM 40 MG/1
TABLET, FILM COATED ORAL
Qty: 90 TABLET | Refills: 1 | Status: SHIPPED | OUTPATIENT
Start: 2020-12-21 | End: 2021-02-17 | Stop reason: SDUPTHER

## 2020-12-30 ENCOUNTER — ANTI-COAG VISIT (OUTPATIENT)
Dept: PHARMACY | Age: 60
End: 2020-12-30
Payer: COMMERCIAL

## 2020-12-30 VITALS — TEMPERATURE: 96.3 F

## 2020-12-30 PROBLEM — Z98.890 S/P ASCENDING AORTIC ANEURYSM REPAIR: Status: ACTIVE | Noted: 2020-12-30

## 2020-12-30 PROBLEM — Z86.79 S/P ASCENDING AORTIC ANEURYSM REPAIR: Status: ACTIVE | Noted: 2020-12-30

## 2020-12-30 LAB — INR BLD: 3

## 2020-12-30 PROCEDURE — 99211 OFF/OP EST MAY X REQ PHY/QHP: CPT

## 2020-12-30 PROCEDURE — 85610 PROTHROMBIN TIME: CPT

## 2020-12-30 NOTE — PROGRESS NOTES
Ms. Natali Michael is a 56 y.o.  female. Ms. Natali Michael had an INR test today. Results were reviewed and appropriate warfarin management was completed. THIS VISIT WAS COMPLETED AS:   []    A VIRTUAL VISIT VIA TELEPHONE IN EFFORTS TO REDUCE THE SPREAD OF COVID-19.  []    A DRIVE-THRU VISIT IN EFFORTS TO REDUCE THE SPREAD OF COVID-19. [x]    AN IN PERSON VISIT. PROTOCOLS WERE FOLLOWED WITH PRECAUTIONS TO REDUCE THE SPREAD OF COVID-19. Patient verifies current dosing regimen: Yes     Warfarin medication reviewed and updated on the patient 's home medication list: Yes   All other medications reviewed and updated on the patient 's home medication list: Yes     Lab Results   Component Value Date    INR 3.00 12/30/2020    INR 4 12/11/2020    INR 3.70 11/13/2020       Patient Findings     Negatives:  Signs/symptoms of bleeding, Change in health, Missed doses, Change in medications, Change in diet/appetite, Bruising          Anticoagulation Summary  As of 12/30/2020    INR goal:  2.5-3.5   TTR:  57.0 % (4.8 y)   INR used for dosing:  3.00 (12/30/2020)   Warfarin maintenance plan:  5 mg (5 mg x 1) every Mon, Wed, Fri; 2.5 mg (5 mg x 0.5) all other days   Weekly warfarin total:  25 mg   Plan last modified:  Kathy Goodman, [facility] (12/11/2020)   Next INR check:  2/5/2021   Priority:  Maintenance   Target end date:   Indefinite    Indications    Heart valve replaced (Resolved) [Z95.2]  Ascending aortic aneurysm ([address] (Resolved) [I71.2]             Anticoagulation Episode Summary     INR check location:  Anticoagulation Clinic    Preferred lab:      Send INR reminders to:  [facility]    Comments:  EPIC ascending aortic aneurysm repair 2/2016          Anticoagulation Care Providers     Provider Role Specialty Phone number    Nydia Hoffman MD Referring Cardiology [phone]    Torrie Meade MD  Family Medicine [phone]          Warfarin plan: Looks and feels well today. No changes in medications or diet. No bruising or bleeding noted. No change in dose for INR of  3.0. Will see her in 4 weeks for next INR. Description    CONTINUE: Warfarin 2.5 mg daily EXCEPT 5 mg every Monday, Wednesday and Friday    Enjoy a small serving of greens (usually broccoli) three times a week. Start adding spinach to your salads 2-3 times times per week or eat cooked spinach once a week    Call with medications changes, especially antibiotics and steroids and including any over-the-counter medications or herbal products. Call if you stop any medications. Immunization History   Administered Date(s) Administered    Influenza Virus Vaccine 10/01/2016, 10/09/2018    Influenza, Intradermal, Preservative free 09/14/2012, 11/20/2015    Influenza, Intradermal, Quadrivalent, Preservative Free 10/04/2017    Influenza, Quadv, IM, PF (6 mo and older Fluzone, Flulaval, Fluarix, and 3 yrs and older Afluria) 11/12/2020    Pneumococcal Polysaccharide ([id number]) 09/18/2015    Polio IPV (IPOL) 09/03/2009    Tdap (Boostrix, Adacel) 09/03/2009, 07/03/2015    Zoster Live (Zostavax) 09/07/2017    Zoster Recombinant (Shingrix) 08/31/2018, 04/12/2019       Immunization history reviewed and updated:  Yes   Influenza vaccine given:  No:      Reviewed AVS with patient / caregiver.       CLINICAL PHARMACY CONSULT: MED RECONCILIATION/REVIEW ADDENDUM    For Pharmacy Admin Tracking Only    PHSO (orange banner): No  Total # of Interventions Recommended (warfarin changes): 0  (warfarin changes)   (other medication updates)- Updated Order #: 0 Updated Order Reason(s):   (#1 for reviewing INR) - Maintenance Safety Lab Monitoring #: 1  Total Interventions Accepted (warfarin related): 0  Time Spent (min) (round up): 15

## 2021-01-29 RX ORDER — WARFARIN SODIUM 2.5 MG/1
2.5 TABLET ORAL DAILY
Qty: 135 TABLET | Refills: 3 | Status: SHIPPED | OUTPATIENT
Start: 2021-01-29 | End: 2021-02-04 | Stop reason: SDUPTHER

## 2021-01-29 RX ORDER — METOPROLOL SUCCINATE 25 MG/1
TABLET, EXTENDED RELEASE ORAL
Qty: 90 TABLET | Refills: 4 | Status: SHIPPED | OUTPATIENT
Start: 2021-01-29 | End: 2022-01-11

## 2021-02-04 RX ORDER — WARFARIN SODIUM 2.5 MG/1
TABLET ORAL
Qty: 135 TABLET | Refills: 3 | Status: SHIPPED | OUTPATIENT
Start: 2021-02-04 | End: 2021-05-07

## 2021-02-04 NOTE — TELEPHONE ENCOUNTER
Pt called now using the [employer]. They told the pt in order to use the mail service they need a call and or a fax of the order and dosage for her WARFARIN. Needs it to say brand name only.  [facility] # [phone] FAX # is [phone]      Ariana Damon # [phone]

## 2021-02-05 ENCOUNTER — ANTI-COAG VISIT (OUTPATIENT)
Dept: PHARMACY | Age: 61
End: 2021-02-05
Payer: COMMERCIAL

## 2021-02-05 DIAGNOSIS — Z95.2 S/P AVR (AORTIC VALVE REPLACEMENT): ICD-10-CM

## 2021-02-05 DIAGNOSIS — Z98.890 S/P ASCENDING AORTIC ANEURYSM REPAIR: ICD-10-CM

## 2021-02-05 DIAGNOSIS — Z86.79 S/P ASCENDING AORTIC ANEURYSM REPAIR: ICD-10-CM

## 2021-02-05 LAB — INTERNATIONAL NORMALIZATION RATIO, POC: 2.4

## 2021-02-05 PROCEDURE — 85610 PROTHROMBIN TIME: CPT

## 2021-02-05 PROCEDURE — 99211 OFF/OP EST MAY X REQ PHY/QHP: CPT

## 2021-02-05 NOTE — PROGRESS NOTES
Ms. Allan Graham is a 56 y.o.  female. Ms. Allan Graham had an INR test today. Results were reviewed and appropriate warfarin management was completed. THIS VISIT WAS COMPLETED AS:   []    A VIRTUAL VISIT VIA TELEPHONE IN EFFORTS TO REDUCE THE SPREAD OF COVID-19.  []    A DRIVE-THRU VISIT IN EFFORTS TO REDUCE THE SPREAD OF COVID-19. [x]    AN IN PERSON VISIT. PROTOCOLS WERE FOLLOWED WITH PRECAUTIONS TO REDUCE THE SPREAD OF COVID-19. Patient verifies current dosing regimen: Yes     Warfarin medication reviewed and updated on the patient 's home medication list: Yes   All other medications reviewed and updated on the patient 's home medication list: No: No Change     Lab Results   Component Value Date    INR 3.00 12/30/2020    INR 4 12/11/2020    INR 3.70 11/13/2020           Anticoagulation Summary  As of 2/5/2021    INR goal:  2.5-3.5   TTR:  57.0 % (4.8 y)   INR used for dosing:     Plan last modified:  Ayaan Manzanares, [facility] (12/11/2020)   Next INR check:     Target end date: Indefinite    Indications    S/P AVR (aortic valve replacement) 2/16 [Z95.2]  S/P ascending aortic aneurysm repair [D41.709  Z86.79]             Anticoagulation Episode Summary     INR check location:  Anticoagulation Clinic    Preferred lab:      Send INR reminders to:  [facility]    Comments:  EPIC. .. ascending aortic aneurysm repair as well as [place] mechanical AVR 2/2016. Anticoagulation Care Providers     Provider Role Specialty Phone number    Shady Ramos MD Referring Cardiology [phone]    Meir Richard MD  Family Medicine [phone]          Warfarin assessment / plan:   Looks and feels well today. No changes in medications or diet. No bruising or bleeding noted. Patient reports missing a dose on Tuesday 2/2 likely contributing to her low INR today 2/5. Instructed patient to increase today's dose to 7.5 mg from 5 mg based on INR of  2.4. Will see in 4 weeks for next INR. Immunization History   Administered Date(s) Administered    Influenza Virus Vaccine 10/01/2016, 10/09/2018    Influenza, Intradermal, Preservative free 09/14/2012, 11/20/2015    Influenza, Intradermal, Quadrivalent, Preservative Free 10/04/2017    Influenza, Quadv, IM, PF (6 mo and older Fluzone, Flulaval, Fluarix, and 3 yrs and older Afluria) 11/12/2020    Pneumococcal Polysaccharide ([id number]) 09/18/2015    Polio IPV (IPOL) 09/03/2009    Tdap (Boostrix, Adacel) 09/03/2009, 07/03/2015    Zoster Live (Zostavax) 09/07/2017    Zoster Recombinant (Shingrix) 08/31/2018, 04/12/2019       Reviewed AVS with patient / caregiver.       CLINICAL PHARMACY CONSULT: MED RECONCILIATION/REVIEW ADDENDUM    For Pharmacy Admin Tracking Only    PHSO (orange banner): No  Total # of Interventions Recommended (warfarin changes): 1  (warfarin changes) - Increased Dose #: 1  (#1 for reviewing INR) - Maintenance Safety Lab Monitoring #: 1  Total Interventions Accepted (warfarin related): 1  Time Spent (min) (round up): 15

## 2021-02-17 RX ORDER — ATORVASTATIN CALCIUM 40 MG/1
TABLET, FILM COATED ORAL
Qty: 90 TABLET | Refills: 1 | Status: SHIPPED | OUTPATIENT
Start: 2021-02-17 | End: 2021-08-03

## 2021-02-26 RX ORDER — AMOXICILLIN 500 MG/1
CAPSULE ORAL
Qty: 4 CAPSULE | Refills: 0 | Status: SHIPPED | OUTPATIENT
Start: 2021-02-26 | End: 2021-05-21

## 2021-02-26 NOTE — TELEPHONE ENCOUNTER
This is used prior to dental procedures and not routinely refilled. Please call the patient to see if she needs this.

## 2021-03-05 ENCOUNTER — ANTI-COAG VISIT (OUTPATIENT)
Dept: PHARMACY | Age: 61
End: 2021-03-05
Payer: COMMERCIAL

## 2021-03-05 VITALS — TEMPERATURE: 97 F

## 2021-03-05 DIAGNOSIS — Z98.890 S/P ASCENDING AORTIC ANEURYSM REPAIR: ICD-10-CM

## 2021-03-05 DIAGNOSIS — Z86.79 S/P ASCENDING AORTIC ANEURYSM REPAIR: ICD-10-CM

## 2021-03-05 DIAGNOSIS — Z95.2 S/P AVR (AORTIC VALVE REPLACEMENT): ICD-10-CM

## 2021-03-05 LAB — INR BLD: 3

## 2021-03-05 PROCEDURE — 85610 PROTHROMBIN TIME: CPT

## 2021-03-05 PROCEDURE — 99211 OFF/OP EST MAY X REQ PHY/QHP: CPT

## 2021-03-05 NOTE — PROGRESS NOTES
Ms. Gi Judd is a 56 y.o.  female. Ms. Gi Judd had an INR test today. Results were reviewed and appropriate warfarin management was completed. THIS VISIT WAS COMPLETED AS:   []    A VIRTUAL VISIT VIA TELEPHONE IN EFFORTS TO REDUCE THE SPREAD OF COVID-19.  []    A DRIVE-THRU VISIT IN EFFORTS TO REDUCE THE SPREAD OF COVID-19. [x]    AN IN PERSON VISIT. PROTOCOLS WERE FOLLOWED WITH PRECAUTIONS TO REDUCE THE SPREAD OF COVID-19. Patient verifies current dosing regimen: Yes     Warfarin medication reviewed and updated on the patient 's home medication list: Yes   All other medications reviewed and updated on the patient 's home medication list: Yes     Lab Results   Component Value Date    INR 3.00 03/05/2021    INR 2.4 02/05/2021    INR 3.00 12/30/2020       Patient Findings     Negatives:  Signs/symptoms of bleeding, Missed doses, Change in medications, Change in diet/appetite, Bruising          Anticoagulation Summary  As of 3/5/2021    INR goal:  2.5-3.5   TTR:  57.9 % (5 y)   INR used for dosing:  3.00 (3/5/2021)   Warfarin maintenance plan:  5 mg (2.5 mg x 2) every Mon, Wed, Fri; 2.5 mg (2.5 mg x 1) all other days   Weekly warfarin total:  25 mg   Plan last modified:  Machelle Corona, [facility] (2/5/2021)   Next INR check:  4/2/2021   Priority:  Maintenance   Target end date: Indefinite    Indications    S/P AVR (aortic valve replacement) 2/16 [Z95.2]  S/P ascending aortic aneurysm repair [S96.733  Z86.79]             Anticoagulation Episode Summary     INR check location:  Anticoagulation Clinic    Preferred lab:      Send INR reminders to:  [facility]    Comments:  EPIC. .. ascending aortic aneurysm repair as well as [place] mechanical AVR 2/2016.           Anticoagulation Care Providers     Provider Role Specialty Phone number    Kyle Ortiz MD Referring Cardiology [phone]    Lurdes Gtz MD  Family Medicine [phone]          Warfarin assessment / plan: Patient appears well, no complaints at today. Denies missing any doses or changes to her medications/diet. INR within goal at 3.0 today. No changes to warfarin therapy at this time. Will see back in 4 weeks    Description    CONTINUE: Warfarin 2.5 mg daily EXCEPT 5 mg every Monday, Wednesday and Friday     Enjoy a small serving of greens (usually broccoli) three times a week. Start adding spinach to your salads 2-3 times times per week or eat cooked spinach once a week    Call with medications changes, especially antibiotics and steroids and including any over-the-counter medications or herbal products. Call if you stop any medications. Immunization History   Administered Date(s) Administered    Influenza Virus Vaccine 10/01/2016, 10/09/2018    Influenza, Intradermal, Preservative free 09/14/2012, 11/20/2015    Influenza, Intradermal, Quadrivalent, Preservative Free 10/04/2017    Influenza, Quadv, IM, PF (6 mo and older Fluzone, Flulaval, Fluarix, and 3 yrs and older Afluria) 11/12/2020    Pneumococcal Polysaccharide ([id number]) 09/18/2015    Polio IPV (IPOL) 09/03/2009    Tdap (Boostrix, Adacel) 09/03/2009, 07/03/2015    Zoster Live (Zostavax) 09/07/2017    Zoster Recombinant (Shingrix) 08/31/2018, 04/12/2019         Reviewed AVS with patient / caregiver.       CLINICAL PHARMACY CONSULT: MED RECONCILIATION/REVIEW ADDENDUM    For Pharmacy Admin Tracking Only    PHSO (orange banner): No  Total # of Interventions Recommended (warfarin changes): 0  (other medication updates)- Updated Order #: 0 Updated Order Reason(s)  (#1 for reviewing INR) - Maintenance Safety Lab Monitoring #: 1  Total Interventions Accepted (warfarin related): 0  Time Spent (min) (round up): 15

## 2021-03-16 ENCOUNTER — IMMUNIZATION (OUTPATIENT)
Dept: PRIMARY CARE CLINIC | Age: 61
End: 2021-03-16
Payer: COMMERCIAL

## 2021-03-16 PROCEDURE — 91301 COVID-19, MODERNA VACCINE 100MCG/0.5ML DOSE: CPT

## 2021-03-16 PROCEDURE — 0011A COVID-19, MODERNA VACCINE 100MCG/0.5ML DOSE: CPT

## 2021-04-02 ENCOUNTER — ANTI-COAG VISIT (OUTPATIENT)
Dept: PHARMACY | Age: 61
End: 2021-04-02
Payer: COMMERCIAL

## 2021-04-02 DIAGNOSIS — Z86.79 S/P ASCENDING AORTIC ANEURYSM REPAIR: ICD-10-CM

## 2021-04-02 DIAGNOSIS — Z95.2 S/P AVR (AORTIC VALVE REPLACEMENT): ICD-10-CM

## 2021-04-02 DIAGNOSIS — Z98.890 S/P ASCENDING AORTIC ANEURYSM REPAIR: ICD-10-CM

## 2021-04-02 LAB — INTERNATIONAL NORMALIZATION RATIO, POC: 2.8

## 2021-04-02 PROCEDURE — 99211 OFF/OP EST MAY X REQ PHY/QHP: CPT

## 2021-04-02 PROCEDURE — G0463 HOSPITAL OUTPT CLINIC VISIT: HCPCS

## 2021-04-02 PROCEDURE — 85610 PROTHROMBIN TIME: CPT

## 2021-04-02 NOTE — PROGRESS NOTES
Ms. Jerry Laureano is a 56 y.o.  female. Ms. Jerry Laureano had an INR test today. Results were reviewed and appropriate warfarin management was completed. THIS VISIT WAS COMPLETED AS:   []    A VIRTUAL VISIT VIA TELEPHONE IN EFFORTS TO REDUCE THE SPREAD OF COVID-19.  []    A DRIVE-THRU VISIT IN EFFORTS TO REDUCE THE SPREAD OF COVID-19. [x]    AN IN PERSON VISIT. PROTOCOLS WERE FOLLOWED WITH PRECAUTIONS TO REDUCE THE SPREAD OF COVID-19. Patient verifies current dosing regimen: Yes     Warfarin medication reviewed and updated on the patient 's home medication list: Yes   All other medications reviewed and updated on the patient 's home medication list: No: no changes     Lab Results   Component Value Date    INR 2.8 2021    INR 3.00 2021    INR 2.4 2021       Patient Findings     Negatives:  Signs/symptoms of bleeding, Change in medications, Change in diet/appetite, Bruising          Anticoagulation Summary  As of 2021    INR goal:  2.5-3.5   TTR:  58.5 % (5.1 y)   INR used for dosin.8 (2021)   Warfarin maintenance plan:  5 mg (2.5 mg x 2) every Mon, Wed, Fri; 2.5 mg (2.5 mg x 1) all other days   Weekly warfarin total:  25 mg   Plan last modified:  Richelle Cortez RPH (2021)   Next INR check:  2021   Priority:  Maintenance   Target end date: Indefinite    Indications    S/P AVR (aortic valve replacement)  [Z95.2]  S/P ascending aortic aneurysm repair [H66.001  Z86.79]             Anticoagulation Episode Summary     INR check location:  Anticoagulation Clinic    Preferred lab:      Send INR reminders to:  [facility]    Comments:  EPIC. .. ascending aortic aneurysm repair as well as [place] mechanical AVR 2016.           Anticoagulation Care Providers     Provider Role Specialty Phone number    Francisco Miller MD Referring Cardiology [phone]    Emil Box MD  Family Medicine [phone]          Warfarin assessment / plan:

## 2021-04-13 ENCOUNTER — IMMUNIZATION (OUTPATIENT)
Dept: PRIMARY CARE CLINIC | Age: 61
End: 2021-04-13
Payer: COMMERCIAL

## 2021-04-13 PROCEDURE — 91301 COVID-19, MODERNA VACCINE 100MCG/0.5ML DOSE: CPT | Performed by: FAMILY MEDICINE

## 2021-04-13 PROCEDURE — 0012A COVID-19, MODERNA VACCINE 100MCG/0.5ML DOSE: CPT | Performed by: FAMILY MEDICINE

## 2021-05-07 ENCOUNTER — ANTI-COAG VISIT (OUTPATIENT)
Dept: PHARMACY | Age: 61
End: 2021-05-07
Payer: COMMERCIAL

## 2021-05-07 DIAGNOSIS — Z86.79 S/P ASCENDING AORTIC ANEURYSM REPAIR: ICD-10-CM

## 2021-05-07 DIAGNOSIS — Z98.890 S/P ASCENDING AORTIC ANEURYSM REPAIR: ICD-10-CM

## 2021-05-07 DIAGNOSIS — Z95.2 S/P AVR (AORTIC VALVE REPLACEMENT): ICD-10-CM

## 2021-05-07 LAB — INTERNATIONAL NORMALIZATION RATIO, POC: 3.7

## 2021-05-07 PROCEDURE — 85610 PROTHROMBIN TIME: CPT

## 2021-05-07 PROCEDURE — 99211 OFF/OP EST MAY X REQ PHY/QHP: CPT

## 2021-05-07 PROCEDURE — G0463 HOSPITAL OUTPT CLINIC VISIT: HCPCS

## 2021-05-07 RX ORDER — WARFARIN SODIUM 2.5 MG/1
2.5 TABLET ORAL DAILY
COMMUNITY
End: 2022-01-11

## 2021-05-07 NOTE — PROGRESS NOTES
INRs have been in range. I will have her take a lower dose today OR have a serving of cooked spinach today and take her regular 5mg dose and then resume her regular weekly warfarin dose. Description    Take warfarin 2.5mg tonight ONLY  THEN CONTINUE: Warfarin 2.5 mg daily EXCEPT 5 mg every Monday, Wednesday and Friday    If you were to have cooked spinach tonight, you could take your regular 5mg dose. Enjoy a small serving of greens (usually broccoli) three times a week. Start adding spinach to your salads 2-3 times times per week or eat cooked spinach once a week    Call with medications changes, especially antibiotics and steroids and including any over-the-counter medications or herbal products. Call if you stop any medications. Immunization History   Administered Date(s) Administered    COVID-19, Moderna, PF, 100mcg/0.5mL 03/16/2021, 04/13/2021    Influenza Virus Vaccine 10/01/2016, 10/09/2018    Influenza, Intradermal, Preservative free 09/14/2012, 11/20/2015    Influenza, Intradermal, Quadrivalent, Preservative Free 10/04/2017    Influenza, Quadv, IM, PF (6 mo and older Fluzone, Flulaval, Fluarix, and 3 yrs and older Afluria) 11/12/2020    Pneumococcal Polysaccharide ([id number]) 09/18/2015    Polio IPV (IPOL) 09/03/2009    Tdap (Boostrix, Adacel) 09/03/2009, 07/03/2015    Zoster Live (Zostavax) 09/07/2017    Zoster Recombinant (Shingrix) 08/31/2018, 04/12/2019         Reviewed AVS with patient / caregiver.       CLINICAL PHARMACY CONSULT: MED RECONCILIATION/REVIEW ADDENDUM    For Pharmacy Admin Tracking Only     Intervention Detail: Dose Adjustment: 1: reason: Therapy Optimization   Total # of Interventions Recommended: 1   Total # of Interventions Accepted: 1   Time Spent (min): 15

## 2021-05-21 ENCOUNTER — VIRTUAL VISIT (OUTPATIENT)
Dept: FAMILY MEDICINE CLINIC | Age: 61
End: 2021-05-21
Payer: COMMERCIAL

## 2021-05-21 ENCOUNTER — TELEPHONE (OUTPATIENT)
Dept: FAMILY MEDICINE CLINIC | Age: 61
End: 2021-05-21

## 2021-05-21 DIAGNOSIS — J06.9 VIRAL URI: Primary | ICD-10-CM

## 2021-05-21 PROCEDURE — 99213 OFFICE O/P EST LOW 20 MIN: CPT | Performed by: FAMILY MEDICINE

## 2021-05-21 RX ORDER — BENZONATATE 100 MG/1
100-200 CAPSULE ORAL 3 TIMES DAILY PRN
Qty: 30 CAPSULE | Refills: 0 | Status: SHIPPED | OUTPATIENT
Start: 2021-05-21 | End: 2021-05-28

## 2021-05-21 RX ORDER — DEXTROMETHORPHAN HYDROBROMIDE AND PROMETHAZINE HYDROCHLORIDE 15; 6.25 MG/5ML; MG/5ML
5 SYRUP ORAL 4 TIMES DAILY PRN
Qty: 240 ML | Refills: 0 | Status: SHIPPED | OUTPATIENT
Start: 2021-05-21 | End: 2021-05-28

## 2021-05-21 ASSESSMENT — PATIENT HEALTH QUESTIONNAIRE - PHQ9
SUM OF ALL RESPONSES TO PHQ QUESTIONS 1-9: 0
2. FEELING DOWN, DEPRESSED OR HOPELESS: 0
1. LITTLE INTEREST OR PLEASURE IN DOING THINGS: 0
SUM OF ALL RESPONSES TO PHQ QUESTIONS 1-9: 0

## 2021-05-21 NOTE — PROGRESS NOTES
TELEHEALTH EVALUATION -- Audio/Visual (During GEZJM-85 public health emergency)  VIDEO VISIT- patient and provider not at same location  Also present:none. UPPER RESPIRATORY VISIT  Subjective:      Chief Complaint   Patient presents with    URI      Jerry Laureano is a 56 y.o. female who presents for evaluation of symptoms of URI. Onset of symptoms was 4 days ago. Symptoms include left ear pressure/pain, congestion, headache described as throbbing , nasal congestion, non productive cough, post nasal drip, sneezing and sore throat and are gradually worsening since that time. Other symptoms: son is getting  tomorrow wants to take something to feel better can't take a decongest ion because of heart issues   Denies: facial pain, low grade fever, shortness of breath, and wheezing. Tried OTC: Robitussin CF with little relief of symptoms, OTC pain reliever    Review of Systems   General ROS: fever? No,    Night sweats? No  Ophthalmic ROS: change in vision? No  Endocrine ROS: fatigue? Yes   Unexpected weight changes? No  Hematological and Lymphatic ROS: easy bruising? No   Swollen lymph nodes? No  ENT ROS: headaches? Yes   Sore throat? Yes  Respiratory ROS: cough? Yes   Wheezing? No  Cardiovascular ROS: chest pain? No   Shortness of breath? No  Gastrointestinal ROS: abdominal pain? No   Change in stools?  No  *Chief complaint, HPI, History and ROS provided by the medical assistant has been reviewed and verified by provider- Emil Box MD    CHART REVIEW  Health Maintenance   Topic Date Due    Breast cancer screen  10/09/2021    Colon cancer screen colonoscopy  10/21/2021    Lipid screen  11/12/2021    Cervical cancer screen  04/12/2022    Pneumococcal 0-64 years Vaccine (2 of 2) 06/22/2023    DTaP/Tdap/Td vaccine (3 - Td) 07/03/2025    Flu vaccine  Completed    Shingles Vaccine  Completed    COVID-19 Vaccine  Completed    Hepatitis C screen  Completed    HIV screen  Completed    Hepatitis A vaccine  Aged Out    Hepatitis B vaccine  Aged Out    Hib vaccine  Aged Out    Meningococcal (ACWY) vaccine  Aged Out     The 10-year ASCVD risk score (Sandra Wilson, et al., 2013) is: 3%    Values used to calculate the score:      Age: 56 years      Sex: Female      Is Non- : No      Diabetic: No      Tobacco smoker: No      Systolic Blood Pressure: 090 mmHg      Is BP treated: No      HDL Cholesterol: 53 mg/dL      Total Cholesterol: 156 mg/dL  Prior to Visit Medications    Medication Sig Taking? Authorizing Provider   promethazine-dextromethorphan (PROMETHAZINE-DM) 6.25-15 MG/5ML syrup Take 5 mLs by mouth 4 times daily as needed for Cough Watch for sedation.  Yes Constance Parker MD   benzonatate (TESSALON PERLES) 100 MG capsule Take 1-2 capsules by mouth 3 times daily as needed for Cough Yes Constance Parker MD   warfarin (COUMADIN) 2.5 MG tablet Take 2.5 mg by mouth daily EXCEPT 5mg every Monday, Wednesday and Friday or as directed by [facility] [phone] Yes Historical Provider, MD   atorvastatin (LIPITOR) 40 MG tablet TAKE 1 TABLET NIGHTLY Yes Constance Parker MD   metoprolol succinate (TOPROL XL) 25 MG extended release tablet TAKE 1 TABLET DAILY Yes Roxana Catalan MD   albuterol sulfate HFA (VENTOLIN HFA) 108 (90 Base) MCG/ACT inhaler Inhale 2 puffs into the lungs every 6 hours as needed for Wheezing Yes Constance Parker MD   cetirizine (ZYRTEC ALLERGY) 10 MG tablet Take 10 mg by mouth as needed  Yes Historical Provider, MD   fluticasone (FLONASE) 50 MCG/ACT nasal spray 2 sprays by Nasal route daily as needed  Yes Historical Provider, MD   aspirin 81 MG chewable tablet Take 81 mg by mouth nightly  Yes Historical Provider, MD      Family History   Problem Relation Age of Onset    High Blood Pressure Father     Heart Failure Father     Coronary Art Dis Father         MI - nonsmoker ; PPM and defibrillator     Mult Sclerosis Father     Cancer Mother         lymphoma    Mult periorbital cellulitis. HENT:   · Normocephalic, atraumatic  · External nose and ears appear normal  · Mucous membranes are moist  · Hearing grossly normal.     NECK: No visible masses  LUNGS:    · Respiratory effort normal.  · No visualized signs of difficulty breathing or respiratory distress  SKIN: warm and dry  · No significant exanthematous lesions or discoloration noted on facial skin  PSYCH:    · Alert and oriented, able to follow commands  · Normal reasoning, insight good  · Normal affect  · No memory disturbance noted  NEURO:   No Facial Asymmetry (Cranial nerve 7 motor function) (limited exam to video visit)      No gaze palsy      Assessment and Plan:      Diagnosis Orders   1. Viral URI  promethazine-dextromethorphan (PROMETHAZINE-DM) 6.25-15 MG/5ML syrup    benzonatate (TESSALON PERLES) 100 MG capsule   Plan below. Virtual Visit (video visit) encounter employed to address concerns as mentioned above. A caregiver was present when appropriate. Due to this being a TeleHealth encounter (During [facility]- public [facility] emergency), evaluation of the following organ systems was limited. Pursuant to the emergency declaration under the [address] [place] [address], [address] [place] [address] authority and the [employer] Act, this Virtual Visit was conducted with patient's (and/or legal guardian's) consent, to reduce the patient's risk of exposure to COVID-19 and provide necessary medical care. The patient (and/or legal guardian) has also been advised to contact this office for worsening conditions or problems, and seek emergency medical treatment and/or call 911 if deemed necessary. Services were provided through a video synchronous discussion virtually to substitute for in-person clinic visit. Patient and provider were located at their individual homes. minutes: 5-10 minutes were spent on the digital evaluation and management of this patient.

## 2021-06-11 ENCOUNTER — ANTI-COAG VISIT (OUTPATIENT)
Dept: PHARMACY | Age: 61
End: 2021-06-11
Payer: COMMERCIAL

## 2021-06-11 DIAGNOSIS — Z95.2 S/P AVR (AORTIC VALVE REPLACEMENT): Primary | ICD-10-CM

## 2021-06-11 DIAGNOSIS — Z86.79 S/P ASCENDING AORTIC ANEURYSM REPAIR: ICD-10-CM

## 2021-06-11 DIAGNOSIS — Z98.890 S/P ASCENDING AORTIC ANEURYSM REPAIR: ICD-10-CM

## 2021-06-11 LAB — INR BLD: 3.1

## 2021-06-11 PROCEDURE — 99211 OFF/OP EST MAY X REQ PHY/QHP: CPT

## 2021-06-11 PROCEDURE — 85610 PROTHROMBIN TIME: CPT

## 2021-06-11 NOTE — PROGRESS NOTES
Ms. Neelam Henderson is a 56 y.o.  female. Ms. Neelam Henderson had an INR test today. Results were reviewed and appropriate warfarin management was completed. THIS VISIT WAS PERFORMED AS: An in person visit. Protocols were followed with precautions to reduce the spread of COVID-19. Patient verifies current dosing regimen: Yes     Warfarin medication reviewed and updated on the patient 's home medication list: Yes   All other medications reviewed and updated on the patient 's home medication list: No changes     Lab Results   Component Value Date    INR 3.10 06/11/2021    INR 3.7 05/07/2021    INR 2.8 04/02/2021       Patient Findings     Negatives:  Signs/symptoms of bleeding, Change in health, Change in activity, Missed doses, Change in medications, Change in diet/appetite, Bruising          Anticoagulation Summary  As of 6/11/2021    INR goal:  2.5-3.5   TTR:  59.0 % (5.3 y)   INR used for dosing:  3.10 (6/11/2021)   Warfarin maintenance plan:  5 mg (2.5 mg x 2) every Mon, Wed, Fri; 2.5 mg (2.5 mg x 1) all other days   Weekly warfarin total:  25 mg   Plan last modified:  Mala Berman [facility] (2/5/2021)   Next INR check:  7/16/2021   Priority:  Maintenance   Target end date: Indefinite    Indications    S/P AVR (aortic valve replacement) 2/16 [Z95.2]  S/P ascending aortic aneurysm repair [Z88.763  Z86.79]             Anticoagulation Episode Summary     INR check location:  Anticoagulation Clinic    Preferred lab:      Send INR reminders to:  [facility]    Comments:  EPIC. .. ascending aortic aneurysm repair as well as [place] mechanical AVR 2/2016. Anticoagulation Care Providers     Provider Role Specialty Phone number    David Lee MD Referring Cardiology [phone]    John Wu MD  Family Medicine [phone]          Warfarin assessment / plan:     Therapeutic INR: Appears well. No changes and no complaints. No change to warfarin therapy today.

## 2021-07-16 ENCOUNTER — ANTI-COAG VISIT (OUTPATIENT)
Dept: PHARMACY | Age: 61
End: 2021-07-16
Payer: COMMERCIAL

## 2021-07-16 DIAGNOSIS — Z86.79 S/P ASCENDING AORTIC ANEURYSM REPAIR: ICD-10-CM

## 2021-07-16 DIAGNOSIS — Z95.2 S/P AVR (AORTIC VALVE REPLACEMENT): Primary | ICD-10-CM

## 2021-07-16 DIAGNOSIS — Z98.890 S/P ASCENDING AORTIC ANEURYSM REPAIR: ICD-10-CM

## 2021-07-16 LAB — INTERNATIONAL NORMALIZATION RATIO, POC: 3.2

## 2021-07-16 PROCEDURE — 99211 OFF/OP EST MAY X REQ PHY/QHP: CPT

## 2021-07-16 PROCEDURE — 85610 PROTHROMBIN TIME: CPT

## 2021-07-16 PROCEDURE — G0463 HOSPITAL OUTPT CLINIC VISIT: HCPCS

## 2021-07-16 NOTE — PROGRESS NOTES
Ms. Mallory Tatum is a 59 y.o.  female. Ms. Mallory Tatum had an INR test today. Results were reviewed and appropriate warfarin management was completed. This visit was performed as: An in person visit. Protocols were followed with precautions to reduce the spread of COVID-19. Patient verifies current dosing regimen: Yes     Warfarin medication reviewed and updated on the patient 's home medication list: Yes   All other medications reviewed and updated on the patient 's home medication list: No: no changes. Lab Results   Component Value Date    INR 3.2 07/16/2021    INR 3.10 06/11/2021    INR 3.7 05/07/2021       Patient Findings     Negatives:  Signs/symptoms of bleeding, Missed doses, Change in medications, Change in diet/appetite, Bruising          Anticoagulation Summary  As of 7/16/2021    INR goal:  2.5-3.5   TTR:  59.8 % (5.4 y)   INR used for dosing:  3.2 (7/16/2021)   Warfarin maintenance plan:  5 mg (2.5 mg x 2) every Mon, Wed, Fri; 2.5 mg (2.5 mg x 1) all other days   Weekly warfarin total:  25 mg   Plan last modified:  Isak Mckeon, [facility] (2/5/2021)   Next INR check:     Priority:  Maintenance   Target end date: Indefinite    Indications    S/P AVR (aortic valve replacement) 2/16 [Z95.2]  S/P ascending aortic aneurysm repair [W63.340  Z86.79]             Anticoagulation Episode Summary     INR check location:  Anticoagulation Clinic    Preferred lab:      Send INR reminders to:  [facility]FF    Comments:  EPIC. .. ascending aortic aneurysm repair as well as [place] mechanical AVR 2/2016. Anticoagulation Care Providers     Provider Role Specialty Phone number    Vale Metzger MD Referring Cardiology [phone]    Mercedes Lowery MD  Family Medicine [phone]          Warfarin assessment / plan:     Appears well. No changes   No acute findings  No change to warfarin therapy today.          Description    CONTINUE: Warfarin 2.5 mg daily EXCEPT 5 mg every Monday, Wednesday and Friday     Enjoy a small serving of greens (usually broccoli) three times a week. Start adding spinach to your salads 2-3 times times per week or eat cooked spinach once a week    Call with medications changes, especially antibiotics and steroids and including any over-the-counter medications or herbal products. Call if you stop any medications. Immunization History   Administered Date(s) Administered    COVID-19, Moderna, PF, 100mcg/0.5mL 03/16/2021, 04/13/2021    Influenza Virus Vaccine 10/01/2016, 10/09/2018    Influenza, Intradermal, Preservative free 09/14/2012, 11/20/2015    Influenza, Intradermal, Quadrivalent, Preservative Free 10/04/2017    Influenza, Quadv, IM, PF (6 mo and older Fluzone, Flulaval, Fluarix, and 3 yrs and older Afluria) 11/12/2020    Pneumococcal Polysaccharide ([id number]) 09/18/2015    Polio IPV (IPOL) 09/03/2009    Tdap (Boostrix, Adacel) 09/03/2009, 07/03/2015    Zoster Live (Zostavax) 09/07/2017    Zoster Recombinant (Shingrix) 08/31/2018, 04/12/2019         Reviewed AVS with patient / caregiver.       CLINICAL PHARMACY CONSULT: MED RECONCILIATION/REVIEW ADDENDUM    For Pharmacy Admin Tracking Only     Intervention Detail:    Total # of Interventions Recommended: 0   Total # of Interventions Accepted: 0   Time Spent (min): 20

## 2021-08-03 RX ORDER — ATORVASTATIN CALCIUM 40 MG/1
TABLET, FILM COATED ORAL
Qty: 90 TABLET | Refills: 1 | Status: SHIPPED | OUTPATIENT
Start: 2021-08-03 | End: 2022-01-31

## 2021-08-20 ENCOUNTER — ANTI-COAG VISIT (OUTPATIENT)
Dept: PHARMACY | Age: 61
End: 2021-08-20
Payer: COMMERCIAL

## 2021-08-20 DIAGNOSIS — Z98.890 S/P ASCENDING AORTIC ANEURYSM REPAIR: ICD-10-CM

## 2021-08-20 DIAGNOSIS — Z95.2 S/P AVR (AORTIC VALVE REPLACEMENT): Primary | ICD-10-CM

## 2021-08-20 DIAGNOSIS — Z86.79 S/P ASCENDING AORTIC ANEURYSM REPAIR: ICD-10-CM

## 2021-08-20 LAB — INR BLD: 2.6

## 2021-08-20 PROCEDURE — 99211 OFF/OP EST MAY X REQ PHY/QHP: CPT

## 2021-08-20 PROCEDURE — 85610 PROTHROMBIN TIME: CPT

## 2021-08-20 NOTE — PROGRESS NOTES
Ms. Millie Irwin is a 59 y.o.  female. Ms. Millie Irwin had an INR test today. Results were reviewed and appropriate warfarin management was completed. This visit was performed as: An in person visit. Protocols were followed with precautions to reduce the spread of COVID-19. Patient verifies current dosing regimen: Yes     Warfarin medication reviewed and updated on the patient 's home medication list: Yes   All other medications reviewed and updated on the patient 's home medication list: No: no changes     Lab Results   Component Value Date    INR 2.60 2021    INR 3.2 2021    INR 3.10 2021       Patient Findings     Negatives:  Signs/symptoms of bleeding, Missed doses, Change in medications, Change in diet/appetite          Anticoagulation Summary  As of 2021    INR goal:  2.5-3.5   TTR:  60.5 % (5.5 y)   INR used for dosin.60 (2021)   Warfarin maintenance plan:  5 mg (2.5 mg x 2) every Mon, Wed, Fri; 2.5 mg (2.5 mg x 1) all other days   Weekly warfarin total:  25 mg   Plan last modified:  Gloria Ag, [facility] (2021)   Next INR check:  2021   Priority:  Maintenance   Target end date: Indefinite    Indications    S/P AVR (aortic valve replacement)  [Z95.2]  S/P ascending aortic aneurysm repair [M84.447  Z86.79]             Anticoagulation Episode Summary     INR check location:  Anticoagulation Clinic    Preferred lab:      Send INR reminders to:  [facility]AFF    Comments:  EPIC. .. ascending aortic aneurysm repair as well as [place] mechanical AVR 2016. Anticoagulation Care Providers     Provider Role Specialty Phone number    Karey Scott MD Referring Cardiology [phone]    Azeb Tipton MD  Family Medicine [phone]          Warfarin assessment / plan:     Appears well. No changes - still eating greens at least three times a week  No acute findings     No change to warfarin therapy today.  Plan to recheck INR in 4 weeks. Description    CONTINUE: Warfarin 2.5 mg daily EXCEPT 5 mg every Monday, Wednesday and Friday     Enjoy a small serving of greens (usually broccoli) three times a week. Start adding spinach to your salads 2-3 times times per week or eat cooked spinach once a week    Call with medications changes, especially antibiotics and steroids and including any over-the-counter medications or herbal products. Call if you stop any medications. Immunization History   Administered Date(s) Administered    COVID-19, Moderna, PF, 100mcg/0.5mL 03/16/2021, 04/13/2021    Influenza Virus Vaccine 10/01/2016, 10/09/2018    Influenza, Intradermal, Preservative free 09/14/2012, 11/20/2015    Influenza, Intradermal, Quadrivalent, Preservative Free 10/04/2017    Influenza, Quadv, IM, PF (6 mo and older Fluzone, Flulaval, Fluarix, and 3 yrs and older Afluria) 11/12/2020    Pneumococcal Polysaccharide ([id number]) 09/18/2015    Polio IPV (IPOL) 09/03/2009    Tdap (Boostrix, Adacel) 09/03/2009, 07/03/2015    Zoster Live (Zostavax) 09/07/2017    Zoster Recombinant (Shingrix) 08/31/2018, 04/12/2019         Reviewed AVS with patient / caregiver.       CLINICAL PHARMACY CONSULT: MED RECONCILIATION/REVIEW ADDENDUM    For Pharmacy Admin Tracking Only     Intervention Detail:    Total # of Interventions Recommended: 0   Total # of Interventions Accepted: 0   Time Spent (min): 15

## 2021-08-27 ENCOUNTER — PATIENT MESSAGE (OUTPATIENT)
Dept: CARDIOLOGY CLINIC | Age: 61
End: 2021-08-27

## 2021-08-27 RX ORDER — AMOXICILLIN 500 MG/1
CAPSULE ORAL
Qty: 4 CAPSULE | Refills: 0 | Status: SHIPPED | OUTPATIENT
Start: 2021-08-27 | End: 2021-11-03 | Stop reason: ALTCHOICE

## 2021-08-27 NOTE — TELEPHONE ENCOUNTER
From: Guerrero Hernandez  To: Geovanna Conklin MD  Sent: 8/27/2021 4:28 PM EDT  Subject: Prescription Question    Hello- I have a dental appointment coming up early September. Would you please call in a prescription for an antibiotic that I need to take prior to that appointment? My date of birth is [birth year] and my pharmacy is [facility] at the corner of [address] [place] [address] and [address] Their number is [phone].  Thank you

## 2021-08-28 ENCOUNTER — NURSE TRIAGE (OUTPATIENT)
Dept: OTHER | Facility: CLINIC | Age: 61
End: 2021-08-28

## 2021-08-28 NOTE — TELEPHONE ENCOUNTER
Reason for Disposition   SEVERE swelling of the entire face    Answer Assessment - Initial Assessment Questions  1. ONSET: \"When did the swelling start? \" (e.g., minutes, hours, days)      08/27/2021    2. LOCATION: \"What part of the face is swollen? \"  Face on the L side is swollen    3. SEVERITY: \"How swollen is it? \"  Hard swollen, not puffy        4. ITCHING: \"Is there any itching? \" If so, ask: \"How much? \"   (Scale 1-10; mild, moderate or severe)  No itching        5. PAIN: \"Is the swelling painful to touch? \" If so, ask: \"How painful is it? \"   (Scale 1-10; mild, moderate or severe)      Painful on the touch, but does not hurt when it is being touched    6. FEVER: \"Do you have a fever? \" If so, ask: \"What is it, how was it measured, and when did it start? \"       Does not feel feverish but neck feels warm    7. CAUSE: \"What do you think is causing the face swelling? \"      Pt has two marks just left of the chin that seems like it could be a bite and pt has been working out in her yard    8. RECURRENT SYMPTOM: \"Have you had face swelling before? \" If so, ask: \"When was the last time? \" \"What happened that time? \"      Never happened before    9. OTHER SYMPTOMS: \"Do you have any other symptoms? \" (e.g., toothache, leg swelling)     None    10. PREGNANCY: \"Is there any chance you are pregnant? \" \"When was your last menstrual period? \"       n/a    Protocols used: FACE SWELLING-ADULT-AH    Received call from [facility] with Red Flag Complaint. Brief description of triage: pt reports that she has two areas on the skin of her jawline that could be insect bites. Has swelling on L side of face and that she felt as if she had swelling in her tongue and throat yesterday (8/27/21). Got up this am (8/28) and tongue and throat feel less swollen, but face seems to be more swollen. T[place] for [place] [facility] [place]     Care advice provided, patient verbalizes understanding; denies any other questions or concerns; instructed to call back for any new or worsening symptoms.

## 2021-08-30 ENCOUNTER — TELEPHONE (OUTPATIENT)
Dept: PHARMACY | Age: 61
End: 2021-08-30

## 2021-08-30 RX ORDER — CLINDAMYCIN HYDROCHLORIDE 300 MG/1
300 CAPSULE ORAL 4 TIMES DAILY
COMMUNITY
Start: 2021-08-28 | End: 2021-09-01 | Stop reason: ALTCHOICE

## 2021-08-30 RX ORDER — LEVOFLOXACIN 750 MG/1
750 TABLET ORAL DAILY
COMMUNITY
Start: 2021-08-28 | End: 2021-11-19

## 2021-08-30 RX ORDER — PREDNISONE 20 MG/1
TABLET ORAL
COMMUNITY
Start: 2021-08-28 | End: 2021-11-03

## 2021-08-30 RX ORDER — FAMOTIDINE 20 MG/1
20 TABLET, FILM COATED ORAL 2 TIMES DAILY
COMMUNITY
Start: 2021-08-28 | End: 2022-07-15

## 2021-08-31 ENCOUNTER — NURSE TRIAGE (OUTPATIENT)
Dept: OTHER | Facility: CLINIC | Age: 61
End: 2021-08-31

## 2021-08-31 NOTE — TELEPHONE ENCOUNTER
Received call from  Natalya Crenshaw  at [facility] with Red Flag Complaint. Brief description of triage:   58 y/o with 2 bug bites on chin   Pt states she went to urgent care,    Saturday   There is a lump under her skin  A little smaller than the size of a walnut  With some pus coming out of the area white to cream in color. With some tenderness    Triage indicates for patient to  Seen in office today  Or tomorrow     Care advice provided, patient verbalizes understanding; denies any other questions or concerns; instructed to call back for any new or worsening symptoms. Writer provided warm transfer to [facility]  at [facility] for appointment scheduling. Attention Provider: Thank you for allowing me to participate in the care of your patient. The patient was connected to triage in response to information provided to the [facility]. Please do not respond through this encounter as the response is not directed to a shared pool. Reason for Disposition   Patient wants to be seen    Answer Assessment - Initial Assessment Questions  1. TYPE of INSECT: \"What type of insect was it? \"       Unsure     2. ONSET: \"When did you get bitten? \"       4 days ago     3. LOCATION: \"Where is the insect bite located? \"       Left of chin    4. REDNESS: \"Is the area red or pink? \" If so, ask \"What size is area of redness? \" (inches or cm). \"When did the redness start? \"      Denies     5. PAIN: \"Is there any pain? \" If so, ask: \"How bad is it? \"  (Scale 1-10; or mild, moderate, severe)      Denies     6. ITCHING: \"Does it itch? \" If so, ask: \"How bad is the itch? \"     - MILD: doesn't interfere with normal activities    - MODERATE-SEVERE: interferes with work, school, sleep, or other activities        Denies     7. SWELLING: \"How big is the swelling? \" (inches, cm, or compare to coins)      [address]     8. OTHER SYMPTOMS: \"Do you have any other symptoms? \"  (e.g., difficulty breathing, hives)      N/a    9.  PREGNANCY: \"Is there any

## 2021-09-01 ENCOUNTER — OFFICE VISIT (OUTPATIENT)
Dept: FAMILY MEDICINE CLINIC | Age: 61
End: 2021-09-01
Payer: COMMERCIAL

## 2021-09-01 ENCOUNTER — OFFICE VISIT (OUTPATIENT)
Dept: ENT CLINIC | Age: 61
End: 2021-09-01
Payer: COMMERCIAL

## 2021-09-01 VITALS
WEIGHT: 206.38 LBS | TEMPERATURE: 97.9 F | BODY MASS INDEX: 35.23 KG/M2 | OXYGEN SATURATION: 97 % | HEART RATE: 57 BPM | RESPIRATION RATE: 12 BRPM | SYSTOLIC BLOOD PRESSURE: 122 MMHG | HEIGHT: 64 IN | DIASTOLIC BLOOD PRESSURE: 76 MMHG

## 2021-09-01 VITALS — HEIGHT: 64 IN | WEIGHT: 206 LBS | BODY MASS INDEX: 35.17 KG/M2

## 2021-09-01 DIAGNOSIS — L03.211 CELLULITIS OF LEFT EXTERNAL CHEEK: Primary | ICD-10-CM

## 2021-09-01 DIAGNOSIS — L03.211 CELLULITIS OF FACE: ICD-10-CM

## 2021-09-01 DIAGNOSIS — L73.9 FOLLICULITIS: ICD-10-CM

## 2021-09-01 DIAGNOSIS — R22.0 FACIAL SWELLING: Primary | ICD-10-CM

## 2021-09-01 PROCEDURE — 99213 OFFICE O/P EST LOW 20 MIN: CPT | Performed by: NURSE PRACTITIONER

## 2021-09-01 PROCEDURE — 99244 OFF/OP CNSLTJ NEW/EST MOD 40: CPT | Performed by: STUDENT IN AN ORGANIZED HEALTH CARE EDUCATION/TRAINING PROGRAM

## 2021-09-01 RX ORDER — CLINDAMYCIN HYDROCHLORIDE 300 MG/1
300 CAPSULE ORAL 4 TIMES DAILY
COMMUNITY
Start: 2021-08-28 | End: 2021-09-01 | Stop reason: ALTCHOICE

## 2021-09-01 RX ORDER — SULFAMETHOXAZOLE AND TRIMETHOPRIM 800; 160 MG/1; MG/1
1 TABLET ORAL 2 TIMES DAILY
Qty: 14 TABLET | Refills: 0 | Status: SHIPPED | OUTPATIENT
Start: 2021-09-01 | End: 2021-09-08

## 2021-09-01 ASSESSMENT — ENCOUNTER SYMPTOMS
CONSTIPATION: 0
VOMITING: 0
NAUSEA: 0
DIARRHEA: 1
COUGH: 0
ABDOMINAL PAIN: 0
SHORTNESS OF BREATH: 0

## 2021-09-01 NOTE — PATIENT INSTRUCTIONS
Should take an over-the-counter probiotic daily while on the antibiotic to help prevent antibiotic associated diarrhea. Should use warm compresses on area for 20 minutes at a time and at least 4 times per day.

## 2021-09-01 NOTE — PROGRESS NOTES
9/1/2021    This is a 59 y.o. female   Chief Complaint   Patient presents with    Other     Patient went to to [facility] on saturday for what she thought was a bug bite. Patient's whole left side of face was swollen. She is on antibiotics and she completed the steroids. THe swelling has improved but she has a hard lump on left side of chin   . HPI  Patient reports that last Friday 8/27 she woke up with what she thought was two bug bites on her chin. Put neosporin on the area. On Sat when she woke up swelling was increased. Went to urgent care and was put on clindamycin 300 mg QID x 10 days and levaquin 750 mg x 7 day and prednisone 20 mg 4 for the first day, 3 for day 3, 2 for day 3, and 1 for day 4. Reports that the swelling has improved. Has a lump on her left lower cheek by chin. This is tender to palpation. Reports that this has not changed since Monday. Has not needed to take pain medications. Denies fever. Tolerating antibiotics well. Having some loose stool. Not currently taking a probiotic. History of AVR. Going to INR clinic tomorrow. On Monday she was decreased to coumadin 2.5 mg daily. She was taking 5 mg on MWF and 2.5 mg other days. Denies bleeding or blood in stool.       Patient Active Problem List   Diagnosis    History of CVA with residual deficit- 2010, vision change    Postmenopausal status    Paralytic strabismus, third or oculomotor nerve palsy, total    Mild intermittent asthma without complication    Hyperlipidemia with target LDL less than 130    GERD (gastroesophageal reflux disease)    Allergic rhinitis    S/P AVR (aortic valve replacement) 2/16    Chronic anticoagulation for AVR- INR 2.5-3.5    Hyperglycemia    Family history of colon cancer in mother    Arthritis of left foot    S/P ascending aortic aneurysm repair          Current Outpatient Medications   Medication Sig Dispense Refill    clindamycin (CLEOCIN) 300 MG capsule Take 300 mg by mouth 4 times daily      famotidine (PEPCID) 20 MG tablet Take 20 mg by mouth 2 times daily      levoFLOXacin (LEVAQUIN) 750 MG tablet Take 750 mg by mouth daily      atorvastatin (LIPITOR) 40 MG tablet TAKE 1 TABLET NIGHTLY 90 tablet 1    warfarin (COUMADIN) 2.5 MG tablet Take 2.5 mg by mouth daily EXCEPT 5mg every Monday, Wednesday and Friday or as directed by [facility]mad[facility] [phone]      cetirizine (ZYRTEC ALLERGY) 10 MG tablet Take 10 mg by mouth as needed       fluticasone (FLONASE) 50 MCG/ACT nasal spray 2 sprays by Nasal route daily as needed       aspirin 81 MG chewable tablet Take 81 mg by mouth nightly       predniSONE (DELTASONE) 20 MG tablet Take 4 tabs on day 1, 3 tabs on day 2, 2 tabs on day 3, and 1 tab on day 4 (Patient not taking: Reported on 9/1/2021)      amoxicillin (AMOXIL) 500 MG capsule Take 4 capsules 30-60 minutes prior to dental appointment. (Patient not taking: Reported on 9/1/2021) 4 capsule 0    metoprolol succinate (TOPROL XL) 25 MG extended release tablet TAKE 1 TABLET DAILY 90 tablet 4    albuterol sulfate HFA (VENTOLIN HFA) 108 (90 Base) MCG/ACT inhaler Inhale 2 puffs into the lungs every 6 hours as needed for Wheezing (Patient not taking: Reported on 9/1/2021) 1 Inhaler 3     No current facility-administered medications for this visit. Allergies   Allergen Reactions    Dye [Iodides] Rash       Review of Systems   Constitutional: Negative for activity change and fever. Respiratory: Negative for cough and shortness of breath. Cardiovascular: Negative for chest pain. Gastrointestinal: Positive for diarrhea. Negative for abdominal pain, constipation, nausea and vomiting. Neurological: Negative for dizziness and headaches. Vitals:    09/01/21 0804   BP: 122/76   Pulse: 57   Resp: 12   Temp: 97.9 °F (36.6 °C)   TempSrc: Oral   SpO2: 97%   Weight: 206 lb 6 oz (93.6 kg)   Height: 5' 4\" (1.626 m)       Body mass index is 35.42 kg/m².      Wt Readings from diarrhea. Has appt with INR clinic tomorrow. Clinic has already adjusted her coumadin dose this week since antibiotic and prednisone use. She will f/u with me if she is not able to see ENT. Return if symptoms worsen or fail to improve.

## 2021-09-02 ENCOUNTER — ANTI-COAG VISIT (OUTPATIENT)
Dept: PHARMACY | Age: 61
End: 2021-09-02
Payer: COMMERCIAL

## 2021-09-02 DIAGNOSIS — Z95.2 S/P AVR (AORTIC VALVE REPLACEMENT): Primary | ICD-10-CM

## 2021-09-02 DIAGNOSIS — Z98.890 S/P ASCENDING AORTIC ANEURYSM REPAIR: ICD-10-CM

## 2021-09-02 DIAGNOSIS — Z86.79 S/P ASCENDING AORTIC ANEURYSM REPAIR: ICD-10-CM

## 2021-09-02 LAB — INTERNATIONAL NORMALIZATION RATIO, POC: 4.3

## 2021-09-02 PROCEDURE — 85610 PROTHROMBIN TIME: CPT

## 2021-09-02 PROCEDURE — 99211 OFF/OP EST MAY X REQ PHY/QHP: CPT

## 2021-09-02 NOTE — PROGRESS NOTES
Ms. April Guerrero is a 59 y.o.  female. Ms. April Guerrero had an INR test today. Results were reviewed and appropriate warfarin management was completed. This visit was performed as: An in person visit. Protocols were followed with precautions to reduce the spread of COVID-19. Patient verifies current dosing regimen: Yes     Warfarin medication reviewed and updated on the patient 's home medication list: Yes   All other medications reviewed and updated on the patient 's home medication list: Yes     Lab Results   Component Value Date    INR 4.3 2021    INR 2.60 2021    INR 3.2 2021       Patient Findings     Positives:  Change in medications    Negatives:  Signs/symptoms of bleeding, Change in diet/appetite, Bruising    Comments:  Levaquin - - significant interaction with warfarin  Prednisone - - significant interaction w warfarin  Clindamycin  - no interaction. Bactrim started -  Took a lower warfarin dose as instructed             Anticoagulation Summary  As of 2021    INR goal:  2.5-3.5   TTR:  60.4 % (5.5 y)   INR used for dosin.3 (2021)   Warfarin maintenance plan:  5 mg (2.5 mg x 2) every Mon, Wed, Fri; 2.5 mg (2.5 mg x 1) all other days   Weekly warfarin total:  25 mg   Plan last modified:  [place], [facility] (2021)   Next INR check:  2021   Priority:  Maintenance   Target end date: Indefinite    Indications    S/P AVR (aortic valve replacement)  [Z95.2]  S/P ascending aortic aneurysm repair [R75.611  Z86.79]             Anticoagulation Episode Summary     INR check location:  Anticoagulation Clinic    Preferred lab:      Send INR reminders to:  [facility]    Comments:  EPIC. .. ascending aortic aneurysm repair as well as [place] mechanical AVR 2016.           Anticoagulation Care Providers     Provider Role Specialty Phone number    Mildred Richards MD Referring Cardiology [phone] Alexander Henry MD  Family Medicine [phone]          Warfarin assessment / plan:     Yvonne Moreno started antibiotic therapy 8/28/21 as well a a four day steroid regimen starting 8/28 as well. The antibiotics were changed to Bactrim 9/1/21. She will be on this for 7 days. We talked to her 8/30/21 and adjusted her warfarin on that day. I will hold warfarin for two days and decrease her warfarin dose while taking the Bactrim. We will reassess in 2 weeks. Description    Hold warfarin today and tomorrow, THEN Warfarin 2.5mg daily through and including 9/8/21. THEN CONTINUE: Warfarin 2.5 mg (one tablet) daily EXCEPT 5 mg (two tablets) every Monday, Wednesday and Friday    Call if they add another round of antibiotics. Enjoy a small serving of greens (usually broccoli) three times a week. Start adding spinach to your salads 2-3 times times per week or eat cooked spinach once a week    Call with medications changes, especially antibiotics and steroids and including any over-the-counter medications or herbal products. Call if you stop any medications. Immunization History   Administered Date(s) Administered    COVID-19, Moderna, PF, 100mcg/0.5mL 03/16/2021, 04/13/2021    Influenza Virus Vaccine 10/01/2016, 10/09/2018    Influenza, Intradermal, Preservative free 09/14/2012, 11/20/2015    Influenza, Intradermal, Quadrivalent, Preservative Free 10/04/2017    Influenza, Quadv, IM, PF (6 mo and older Fluzone, Flulaval, Fluarix, and 3 yrs and older Afluria) 11/12/2020    Pneumococcal Polysaccharide ([id number]) 09/18/2015    Polio IPV (IPOL) 09/03/2009    Tdap (Boostrix, Adacel) 09/03/2009, 07/03/2015    Zoster Live (Zostavax) 09/07/2017    Zoster Recombinant (Shingrix) 08/31/2018, 04/12/2019         Reviewed AVS with patient / caregiver.       CLINICAL PHARMACY CONSULT: MED RECONCILIATION/REVIEW ADDENDUM    For Pharmacy Admin Tracking Only     Intervention Detail: Dose Adjustment: 1, reason: Therapy Optimization   Total # of Interventions Recommended: 1   Total # of Interventions Accepted: 1   Time Spent (min): 20

## 2021-09-03 NOTE — PROGRESS NOTES
[address] Thad Christensen (:  1960) is a 59 y.o. female, here for evaluation of the following chief complaint(s):  Skin Problem      ASSESSMENT/PLAN:  1. Facial swelling  2. Cellulitis of face  3. Folliculitis      This is a very pleasant 59 y.o. female here today for evaluation of the the above-noted complaints. On exam, the patient has evidence of a limited area of facial cellulitis along the body of the left mandible. This is adjutant to 2 small areas of folliculitis. I would like the patient to start taking Bactrim double strength twice daily for the next 10 days. I explained to the patient that the induration associated with her facial cellulitis can take some time to resolve. Fortunately, there is no evidence of a fluid collection that requires incision and drainage today. I will plan to see her back in approximately 1 week to monitor her response to treatment or sooner should she worsen. The risks, benefits and alternatives to antibiotics were discussed with patient in detail including but not limited to the risk of GI upset, xerostomia, anaphylaxis and rash/ sun sensitivity. The patient was instructed to contact me should they develop any adverse reactions or have other concerns. SUBJECTIVE/OBJECTIVE:  DMITRY Avelar is here today for evaluation of issues related to swelling of the left face. This began approximately 3 to 4 days ago. She was seen by her primary care physician and started on clindamycin. Since that time the swelling has persisted and she does not think it is been improving. She has never had a soft tissue skin infection before and she has no history of MRSA. She denies any similar infections. She notes that this all began when she developed a small areas along her left mentum that she picked at and then after the area began to swell and become red.     REVIEW OF SYSTEMS  The following systems were reviewed and revealed the following in addition to any already discussed in the HPI:    PHYSICAL EXAM    GENERAL: No acute distress, alert and oriented, no hoarseness, strong voice  EYES: EOMI, Anti-icteric  HENT:   Head: Normocephalic and atraumatic. Face:  Symmetric, facial nerve intact, no sinus tenderness  Right Ear: Normal external ear, normal external auditory canal, intact tympanic membrane with normal mobility and aerated middle ear  Left Ear: Normal external ear, normal external auditory canal, intact tympanic membrane with normal mobility and aerated middle ear  Mouth/Oral Cavity:  normal lips, Uvula is midline, no mucosal lesions, no trismus, normal dentition, normal salivary quality/flow  Oropharynx/Larynx:  normal oropharynx, 1+ tonsils; patient did not tolerate mirror exam due to excessive gag reflex  Nose:Normal external nasal appearance. Anterior rhinoscopy shows  a deviated septum preventing view posteriorly. Normal appearing turbinates. Normal mucosa   NECK: Normal range of motion, no thyromegaly, trachea is midline, no lymphadenopathy, no neck masses, no crepitus  CHEST: Normal respiratory effort, no retractions, breathing comfortably  SKIN: No rashes, normal appearing skin, no evidence of skin lesions/tumors  Neuro:  cranial nerve II-XII intact; normal gait  Cardio:  no edema    There is a 3 x 2 cm area of induration adjutant to two 1 cm areas of folliculitis. There is no evidence of fluctuance and there is no significant erythema. There is no evidence of any underlying mass or cervical adenopathy. PROCEDURE      This note was generated completely or in part utilizing Dragon dictation speech recognition software. Occasionally, words are mistranscribed and despite editing, the text may contain inaccuracies due to incorrect word recognition. If further clarification is needed please contact the office at [phone].     An electronic signature was used to authenticate this note.     --Malena Hylton MD

## 2021-09-08 ENCOUNTER — OFFICE VISIT (OUTPATIENT)
Dept: ENT CLINIC | Age: 61
End: 2021-09-08
Payer: COMMERCIAL

## 2021-09-08 VITALS
WEIGHT: 203 LBS | SYSTOLIC BLOOD PRESSURE: 130 MMHG | HEIGHT: 64 IN | DIASTOLIC BLOOD PRESSURE: 80 MMHG | BODY MASS INDEX: 34.66 KG/M2

## 2021-09-08 DIAGNOSIS — L73.9 FOLLICULITIS: ICD-10-CM

## 2021-09-08 DIAGNOSIS — H91.93 BILATERAL HEARING LOSS, UNSPECIFIED HEARING LOSS TYPE: ICD-10-CM

## 2021-09-08 DIAGNOSIS — H93.12 TINNITUS OF LEFT EAR: ICD-10-CM

## 2021-09-08 DIAGNOSIS — R22.0 FACIAL SWELLING: Primary | ICD-10-CM

## 2021-09-08 DIAGNOSIS — L03.211 CELLULITIS OF FACE: ICD-10-CM

## 2021-09-08 PROCEDURE — 99213 OFFICE O/P EST LOW 20 MIN: CPT | Performed by: STUDENT IN AN ORGANIZED HEALTH CARE EDUCATION/TRAINING PROGRAM

## 2021-09-08 NOTE — PROGRESS NOTES
1215 Thad Christensen (:  1960) is a 59 y.o. female, here for evaluation of the following chief complaint(s):  Follow-up      ASSESSMENT/PLAN:  1. Facial swelling  2. Cellulitis of face  3. Folliculitis      This is a very pleasant 59 y.o. female here today for evaluation of the the above-noted complaints. Since I saw the patient last, she has had significant improvement of her left-sided facial cellulitis. The areas of folliculitis have now resolved. I have asked the patient to stop her Bactrim at this time. We discussed that as this is an isolated incident we can monitor it expectantly going forward. We also discussed that the area where she had the induration and cellulitis is right around the area of the perifacial lymph nodes. She may have some slight swelling of her perifacial lymph nodes for some time wall the inflammation fully resolves. Regarding the patient's new complaint today of tinnitus and hearing loss, I advised her that we should obtain an audiogram to better evaluate her type and degree of hearing loss. Pending on what this shows we can discuss further treatment options. SUBJECTIVE/OBJECTIVE:  DMITRY    Edmund Thornton is here today for evaluation of issues related to swelling of the left face. This began approximately 3 to 4 days ago. She was seen by her primary care physician and started on clindamycin. Since that time the swelling has persisted and she does not think it is been improving. She has never had a soft tissue skin infection before and she has no history of MRSA. She denies any similar infections. She notes that this all began when she developed a small areas along her left mentum that she picked at and then after the area began to swell and become red. Update 2020[de-identified]    The patient presents today for follow-up regarding her left-sided facial cellulitis.   She has noticed significant improvement in the swelling, pain and redness. She has not had anything draining from the site. The patient also has a new complaint today regarding tinnitus and hearing loss. She has never had a hearing test.  She states that the tinnitus in her left ear is nonpulsatile and pretty much constant. REVIEW OF SYSTEMS  The following systems were reviewed and revealed the following in addition to any already discussed in the HPI:    PHYSICAL EXAM    GENERAL: No acute distress, alert and oriented, no hoarseness, strong voice  EYES: EOMI, Anti-icteric  HENT:   Head: Normocephalic and atraumatic. Face:  Symmetric, facial nerve intact, no sinus tenderness  Right Ear: Normal external ear, normal external auditory canal, intact tympanic membrane with normal mobility and aerated middle ear  Left Ear: Normal external ear, normal external auditory canal, intact tympanic membrane with normal mobility and aerated middle ear  Mouth/Oral Cavity:  normal lips, Uvula is midline, no mucosal lesions, no trismus, normal dentition, normal salivary quality/flow  Oropharynx/Larynx:  normal oropharynx, 1+ tonsils; patient did not tolerate mirror exam due to excessive gag reflex  Nose:Normal external nasal appearance. Anterior rhinoscopy shows  a deviated septum preventing view posteriorly. Normal appearing turbinates. Normal mucosa   NECK: Normal range of motion, no thyromegaly, trachea is midline, no lymphadenopathy, no neck masses, no crepitus  CHEST: Normal respiratory effort, no retractions, breathing comfortably  SKIN: No rashes, normal appearing skin, no evidence of skin lesions/tumors  Neuro:  cranial nerve II-XII intact; normal gait  Cardio:  no edema    Significant improvement in the erythema and induration of the left face. There is resolution of the folliculitis adjutant to the site of her cellulitis.     PROCEDURE      This note was generated completely or in part utilizing Dragon dictation speech recognition software. Occasionally, words are mistranscribed and despite editing, the text may contain inaccuracies due to incorrect word recognition. If further clarification is needed please contact the office at [phone]. An electronic signature was used to authenticate this note.     --Nickie Lindsey MD

## 2021-09-17 ENCOUNTER — ANTI-COAG VISIT (OUTPATIENT)
Dept: PHARMACY | Age: 61
End: 2021-09-17
Payer: COMMERCIAL

## 2021-09-17 DIAGNOSIS — Z86.79 S/P ASCENDING AORTIC ANEURYSM REPAIR: ICD-10-CM

## 2021-09-17 DIAGNOSIS — Z95.2 S/P AVR (AORTIC VALVE REPLACEMENT): Primary | ICD-10-CM

## 2021-09-17 DIAGNOSIS — Z98.890 S/P ASCENDING AORTIC ANEURYSM REPAIR: ICD-10-CM

## 2021-09-17 LAB — INR BLD: 2.5

## 2021-09-17 PROCEDURE — 85610 PROTHROMBIN TIME: CPT

## 2021-09-17 PROCEDURE — 99211 OFF/OP EST MAY X REQ PHY/QHP: CPT

## 2021-09-17 NOTE — PROGRESS NOTES
Ms. Imelda Paula is a 59 y.o.  female. Ms. Imelda Paual had an INR test today. Results were reviewed and appropriate warfarin management was completed. This visit was performed as: An in person visit. Protocols were followed with precautions to reduce the spread of COVID-19. Patient verifies current dosing regimen: Yes     Warfarin medication reviewed and updated on the patient 's home medication list: Yes   All other medications reviewed and updated on the patient 's home medication list: No: no changes     Lab Results   Component Value Date    INR 2.50 2021    INR 4.3 2021    INR 2.60 2021       Patient Findings     Positives:  Bruising    Negatives:  Signs/symptoms of bleeding, Missed doses, Change in medications, Change in diet/appetite          Anticoagulation Summary  As of 2021    INR goal:  2.5-3.5   TTR:  60.4 % (5.5 y)   INR used for dosin.50 (2021)   Warfarin maintenance plan:  5 mg (2.5 mg x 2) every Mon, Wed, Fri; 2.5 mg (2.5 mg x 1) all other days   Weekly warfarin total:  25 mg   Plan last modified:  Lamar Pan RP (2021)   Next INR check:  10/15/2021   Priority:  Maintenance   Target end date: Indefinite    Indications    S/P AVR (aortic valve replacement)  [Z95.2]  S/P ascending aortic aneurysm repair [U74.040  Z86.79]             Anticoagulation Episode Summary     INR check location:  Anticoagulation Clinic    Preferred lab:      Send INR reminders to:  [facility]    Comments:  EPIC. .. ascending aortic aneurysm repair as well as [place] mechanical AVR 2016. Anticoagulation Care Providers     Provider Role Specialty Phone number    Cayetano Castleman, MD Referring Cardiology [phone]    Sophia Tavera MD  Family Medicine [phone]          Warfarin assessment / plan:   Patient appears well today and with no complains. No changes to her medications or diet. She is no longer on the antibiotics.  No signs of bleeding, but she does have some usual bruising. She denies missing any dose of her Warfarin. Her INR was therapeutic today at 2.50. No change to warfarin therapy today. Patient was reminded to call with any changes to her medications. Will see her again in 4 weeks. Description    CONTINUE: Warfarin 2.5 mg (one tablet) daily EXCEPT 5 mg (two tablets) every Monday, Wednesday and Friday    Call if they add another round of antibiotics. Enjoy a small serving of greens (usually broccoli) three times a week. Start adding spinach to your salads 2-3 times times per week or eat cooked spinach once a week    Call with medications changes, especially antibiotics and steroids and including any over-the-counter medications or herbal products. Call if you stop any medications. Immunization History   Administered Date(s) Administered    COVID-19, Moderna, PF, 100mcg/0.5mL 2021, 2021    Influenza Virus Vaccine 10/01/2016, 10/09/2018    Influenza, Intradermal, Preservative free 2012, 2015    Influenza, Intradermal, Quadrivalent, Preservative Free 10/04/2017    Influenza, Quadv, IM, PF (6 mo and older Fluzone, Flulaval, Fluarix, and 3 yrs and older Afluria) 2020    Pneumococcal Polysaccharide ([id number]) 2015    Polio IPV (IPOL) 2009    Tdap (Boostrix, Adacel) 2009, 2015    Zoster Live (Zostavax) 2017    Zoster Recombinant (Shingrix) 2018, 2019         Reviewed AVS with patient / caregiver.       CLINICAL PHARMACY CONSULT: MED RECONCILIATION/REVIEW ADDENDUM    For Pharmacy Admin Tracking Only     Intervention Detail: Adherence Monitorin   Total # of Interventions Recommended: 0   Total # of Interventions Accepted: 0   Time Spent (min): 15

## 2021-10-05 ENCOUNTER — TELEPHONE (OUTPATIENT)
Dept: FAMILY MEDICINE CLINIC | Age: 61
End: 2021-10-05

## 2021-10-05 ENCOUNTER — NURSE ONLY (OUTPATIENT)
Dept: PRIMARY CARE CLINIC | Age: 61
End: 2021-10-05

## 2021-10-05 DIAGNOSIS — Z11.52 ENCOUNTER FOR SCREENING FOR COVID-19: Primary | ICD-10-CM

## 2021-10-05 NOTE — PROGRESS NOTES
Esteban Sheyla Hernandez received a viral test for COVID-19. They were educated on isolation and quarantine as appropriate. For any symptoms, they were directed to seek care from their PCP, given contact information to establish with a doctor, directed to an urgent care or the emergency room.

## 2021-10-06 LAB — SARS-COV-2: NOT DETECTED

## 2021-10-12 ENCOUNTER — TELEPHONE (OUTPATIENT)
Dept: FAMILY MEDICINE CLINIC | Age: 61
End: 2021-10-12

## 2021-10-12 NOTE — TELEPHONE ENCOUNTER
----- Message from Benoittroy Shellir sent at 10/12/2021  3:50 PM EDT -----  Subject: Message to Provider    QUESTIONS  Information for Provider? Pt called to schedule an appt for a flu shot. Pt   screened red. Please advise.   ---------------------------------------------------------------------------  --------------  CALL BACK INFO  What is the best way for the office to contact you? OK to leave message on   voicemail  Preferred Call Back Phone Number?  [phone]  ---------------------------------------------------------------------------  --------------  SCRIPT ANSWERS  undefined

## 2021-10-12 NOTE — TELEPHONE ENCOUNTER
PLEASE CALL PT AND SCHEDULE HER FOR A FLU SHOT IT SAID SHE IS SCREENED RED SHE MAY NEED TO WAIT NOT SURE WHY SHE IS SCREENED RED

## 2021-10-13 NOTE — TELEPHONE ENCOUNTER
Called patient  Patient was tested for covid on 10/05  Patient had congestion, headache on 10/05   Patient symptoms are all gone except slight cough still getting better   Scheduled appt 10/28 for flu shot two weeks out

## 2021-10-15 ENCOUNTER — ANTI-COAG VISIT (OUTPATIENT)
Dept: PHARMACY | Age: 61
End: 2021-10-15
Payer: COMMERCIAL

## 2021-10-15 DIAGNOSIS — Z23 NEEDS FLU SHOT: ICD-10-CM

## 2021-10-15 DIAGNOSIS — Z98.890 S/P ASCENDING AORTIC ANEURYSM REPAIR: ICD-10-CM

## 2021-10-15 DIAGNOSIS — Z95.2 S/P AVR (AORTIC VALVE REPLACEMENT): Primary | ICD-10-CM

## 2021-10-15 DIAGNOSIS — Z86.79 S/P ASCENDING AORTIC ANEURYSM REPAIR: ICD-10-CM

## 2021-10-15 LAB — INTERNATIONAL NORMALIZATION RATIO, POC: 2.5

## 2021-10-15 PROCEDURE — 6360000002 HC RX W HCPCS: Performed by: FAMILY MEDICINE

## 2021-10-15 PROCEDURE — G0008 ADMIN INFLUENZA VIRUS VAC: HCPCS | Performed by: FAMILY MEDICINE

## 2021-10-15 PROCEDURE — 90686 IIV4 VACC NO PRSV 0.5 ML IM: CPT | Performed by: FAMILY MEDICINE

## 2021-10-15 PROCEDURE — 99211 OFF/OP EST MAY X REQ PHY/QHP: CPT

## 2021-10-15 PROCEDURE — 85610 PROTHROMBIN TIME: CPT

## 2021-10-15 RX ADMIN — INFLUENZA A VIRUS A/VICTORIA/2570/2019 IVR-215 (H1N1) ANTIGEN (PROPIOLACTONE INACTIVATED), INFLUENZA A VIRUS A/CAMBODIA/E0826360/2020 IVR-224 (H3N2) ANTIGEN (PROPIOLACTONE INACTIVATED), INFLUENZA B VIRUS B/VICTORIA/705/2018 BVR-11 ANTIGEN (PROPIOLACTONE INACTIVATED), INFLUENZA B VIRUS B/PHUKET/3073/2013 BVR-1B ANTIGEN (PROPIOLACTONE INACTIVATED) 0.5 ML: 15; 15; 15; 15 INJECTION, SUSPENSION INTRAMUSCULAR at 08:32

## 2021-10-15 NOTE — PROGRESS NOTES
Ms. Ben Vasquez is a 59 y.o.  female. Ms. Ben Vasquez had an INR test today. Results were reviewed and appropriate warfarin management was completed. This visit was performed as: An in person visit. Protocols were followed with precautions to reduce the spread of COVID-19. Patient verifies current dosing regimen: Yes     Warfarin medication reviewed and updated on the patient 's home medication list: Yes   All other medications reviewed and updated on the patient 's home medication list: No: No Changes      Lab Results   Component Value Date    INR 2.5 10/15/2021    INR 2.50 2021    INR 4.3 2021       Patient Findings     Negatives:  Signs/symptoms of bleeding, Missed doses, Change in medications, Change in diet/appetite, Bruising          Anticoagulation Summary  As of 10/15/2021    INR goal:  2.5-3.5   TTR:  60.9 % (5.6 y)   INR used for dosin.5 (10/15/2021)   Warfarin maintenance plan:  5 mg (2.5 mg x 2) every Mon, Wed, Fri; 2.5 mg (2.5 mg x 1) all other days   Weekly warfarin total:  25 mg   Plan last modified:  Diana Lynch, [address] [facility] (2021)   Next INR check:  2021   Priority:  Maintenance   Target end date: Indefinite    Indications    S/P AVR (aortic valve replacement)  [Z95.2]  S/P ascending aortic aneurysm repair [J14.668  Z86.79]             Anticoagulation Episode Summary     INR check location:  Anticoagulation Clinic    Preferred lab:      Send INR reminders to:  [facility]    Comments:  EPIC. .. ascending aortic aneurysm repair as well as [place] mechanical AVR 2016. Anticoagulation Care Providers     Provider Role Specialty Phone number    Sara Andre MD Referring Cardiology [phone]    Juventino Palmer MD  Family Medicine [phone]          Warfarin assessment / plan:     Looks and feels well today. No changes in medications or diet. No bruising or bleeding noted. Patient received her flu shot today.  No change in dose for INR of 2.5. Will see in 4 weeks for next INR. Description    CONTINUE: Warfarin 2.5 mg (one tablet) daily EXCEPT 5 mg (two tablets) every Monday, Wednesday and Friday    Call if they add another round of antibiotics. Enjoy a small serving of greens (usually broccoli) three times a week. Start adding spinach to your salads 2-3 times times per week or eat cooked spinach once a week    Call with medications changes, especially antibiotics and steroids and including any over-the-counter medications or herbal products. Call if you stop any medications. Immunization History   Administered Date(s) Administered    COVID-19, Moderna, PF, 100mcg/0.5mL 03/16/2021, 04/13/2021    Influenza Virus Vaccine 10/01/2016, 10/09/2018    Influenza, Intradermal, Preservative free 09/14/2012, 11/20/2015    Influenza, Intradermal, Quadrivalent, Preservative Free 10/04/2017    Influenza, Quadv, IM, PF (6 mo and older Fluzone, Flulaval, Fluarix, and 3 yrs and older Afluria) 11/12/2020, 10/15/2021    Pneumococcal Polysaccharide ([id number]) 09/18/2015    Polio IPV (IPOL) 09/03/2009    Tdap (Boostrix, Adacel) 09/03/2009, 07/03/2015    Zoster Live (Zostavax) 09/07/2017    Zoster Recombinant (Shingrix) 08/31/2018, 04/12/2019         Reviewed AVS with patient / caregiver.       CLINICAL PHARMACY CONSULT: MED RECONCILIATION/REVIEW ADDENDUM    For Pharmacy Admin Tracking Only     Intervention Detail: Vaccine Recommended/Administered   Total # of Interventions Recommended: 1   Total # of Interventions Accepted: 1   Time Spent (min): 20

## 2021-11-01 PROBLEM — I10 ESSENTIAL HYPERTENSION: Status: ACTIVE | Noted: 2021-11-01

## 2021-11-01 PROBLEM — J43.1 PANLOBULAR EMPHYSEMA (HCC): Status: ACTIVE | Noted: 2021-11-01

## 2021-11-01 NOTE — PROGRESS NOTES
[facility]      Cardiology Progress Note    Bill Scott  1960 November 19, 2021     Primary care physician: Caroline Valdez MD  Reason for Visit: Aortic stenosis and aneurysm repair    CC: \"no issues\"    HPI:  The patient is 59 y.o. 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 Originally, she was referred based on an abnormal echocardiogram. She was seen by her PCP for her annual check up and an echocardiogram was ordered to follow up on a heart murmur. The work-up showed a bicuspid aortic valve with severe stenosis and a severely dilated ascending aorta. She underwent a Bentall procedure on 2/16/16 at [facility] with Dr. Charley Schmitt. She had a syncopal episode of uncertain etiology in [id number] but denies recurrence. Today, she states that she has not had any issues. She is feeling well and denies any new cardiac complaints. She denies any chest pains or worsening shortness of breath. She reports chronic LE edema that is unchanged. Patient denies exertional chest pain/pressure, dyspnea at rest, worsening NICHOLS, PND, orthopnea, palpitations, lightheadedness, weight changes, changes in LE edema, and syncope. She is following with the coumadin clinic to manage her PT/INR. She reports compliance with her medications and tolerating. She denies excessive bruising or unusual bleeding.      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

AGE/SEX:        73 years / Female



INDICATIONS:  Mass visualized on CT.



CLINICAL DATA:  This is the patient's initial encounter. Patient reports that signs and symptoms have
 been present for 1 day and indicates a pain score of 3/10. 

                                                                          

MEDICAL/SURGICAL HISTORY:       Gastroesophageal reflux disease. Dementia.  Hysterectomy. Dilation an
d Curettage.



COMPARISON:      [facility]BDOM[place] & PELVIS W/O CONTRAST, 6/25/2018.  . 





MEASUREMENTS:

Right Kidney:__9.2 x 4.7 x 4.3 cm

Left Kidney:__9.7 x 4.3 x 4.5  cm



FINDINGS: 

Right Kidney: Normal echotexture. No renal stone or solid mass is identified. There is an exophytic a
nechoic avascular lesion at the lower pole measuring 3.7 x 3.9 x 3.2 cm. Fullness of the collecting s
ystem is present.

Left Kidney: Normal echotexture. No mass is present. There is no hydronephrosis.

Bladder: Within normal limits given the degree of distension.

Other:  None.



CONCLUSION: 

1.  The incidentally detected lesion in the right kidney on the recent emergent CT has features diagn
ostic of a simple benign cyst. No further workup is indicated for this lesion.

2.  Fullness of the collecting systems bilaterally without gifty hydronephrosis appreciated.



Electronically signed by: Luther Carrasquillo MD  6/25/2018 11:02 PM EDT Hypercholesteremia     Murmur     since childhood--repaired with valve replacement    Paralytic strabismus, third or oculomotor nerve palsy, total     after CVA    Shingles     Tinnitus     Vertigo     mild    Wears glasses     Zoster 2012    left costal margin     Past Surgical History:   Procedure Laterality Date    AORTIC VALVE REPLACEMENT  16    +asc aortic aneurysm repair - 21mm [address] [place] valved conduit [facility])    ARTERIAL ANEURYSM REPAIR      craniotomy. internal carotid artery. clipped.  WISDOM TOOTH EXTRACTION       Family History   Problem Relation Age of Onset    High Blood Pressure Father     Heart Failure Father     Coronary Art Dis Father         MI - nonsmoker ; PPM and defibrillator     Mult Sclerosis Father     Cancer Mother         lymphoma    Mult Sclerosis Sister     Diabetes Maternal Grandfather      Social History     Tobacco Use    Smoking status: Former Smoker     Packs/day: 1.00     Years: 21.00     Pack years: 21.00     Quit date: 1999     Years since quittin.8    Smokeless tobacco: Never Used    Tobacco comment: started age 23   Vaping Use    Vaping Use: Never used   Substance Use Topics    Alcohol use:  Yes     Alcohol/week: 1.0 standard drink     Types: 1 Glasses of wine per week     Comment: 1 per month     Drug use: No     Comment: never     Allergies   Allergen Reactions    Dye [Iodides] Rash     Current Outpatient Medications   Medication Sig Dispense Refill    famotidine (PEPCID) 20 MG tablet Take 20 mg by mouth 2 times daily      atorvastatin (LIPITOR) 40 MG tablet TAKE 1 TABLET NIGHTLY 90 tablet 1    warfarin (COUMADIN) 2.5 MG tablet Take 2.5 mg by mouth daily EXCEPT 5mg every Monday, Wednesday and Friday or as directed by [facility] [phone]      metoprolol succinate (TOPROL XL) 25 MG extended release tablet TAKE 1 TABLET DAILY 90 tablet 4    cetirizine (ZYRTEC ALLERGY) 10 MG tablet Take 10 mg by mouth as needed  fluticasone (FLONASE) 50 MCG/ACT nasal spray 2 sprays by Nasal route daily as needed       aspirin 81 MG chewable tablet Take 81 mg by mouth nightly        No current facility-administered medications for this visit. Review of Systems:  · Constitutional: no unanticipated weight loss. There's been no change in energy level, sleep pattern, or activity level. No fevers, chills. · Eyes: No visual changes or diplopia. No scleral icterus. · ENT: No Headaches, hearing loss or vertigo. No mouth sores or sore throat. · Cardiovascular: as reviewed in HPI  · Respiratory: No cough or wheezing, no sputum production. No hematemesis. · Gastrointestinal: No abdominal pain, appetite loss, blood in stools. No change in bowel or bladder habits. · Genitourinary: No dysuria, trouble voiding, or hematuria. · Musculoskeletal:  No gait disturbance, no joint complaints. · Integumentary: No rash or pruritis. · Neurological: No headache, diplopia, change in muscle strength, numbness or tingling. · Psychiatric: No anxiety or depression. · Endocrine: No temperature intolerance. No excessive thirst, fluid intake, or urination. No tremor. · Hematologic/Lymphatic: No abnormal bruising or bleeding, blood clots or swollen lymph nodes. Allergic/Immunologic: No nasal congestion or hives. Physical Exam:   /72   Pulse 68   Ht 5' 4\" (1.626 m)   Wt 213 lb (96.6 kg)   LMP 02/09/2006   SpO2 98%   BMI 36.56 kg/m²   Wt Readings from Last 3 Encounters:   11/19/21 213 lb (96.6 kg)   11/03/21 210 lb 12.8 oz (95.6 kg)   09/08/21 203 lb (92.1 kg)     Constitutional: She is oriented to person, place, and time. She appears well-developed and well-nourished. In no acute distress. Head: Normocephalic and atraumatic. Pupils equal and round. Neck: Neck supple. No JVP or carotid bruit appreciated. No mass and no thyromegaly present. No lymphadenopathy present. Cardiovascular: Normal rate.  Exam reveals no gallop and no friction rub. Mechanical heart sounds. Pulmonary/Chest: Effort normal and breath sounds normal. No respiratory distress. She has no wheezes, rhonchi or rales. Abdominal: Soft, non-tender. Bowel sounds are normal. She exhibits no organomegaly, mass or bruit. Extremities: Trace - 1+ BLE edema. No cyanosis or clubbing. Pulses are 2+ radial/dorsalis pedis/posterior tibial/carotid bilaterally. Neurological: No gross cranial nerve deficit. Coordination normal.   Skin: Skin is warm and dry. There is no rash or diaphoresis. Psychiatric: She has a normal mood and affect. Her speech is normal and behavior is normal.     Lab Review:   Lab Results   Component Value Date    TRIG 133 11/12/2020    HDL 53 11/12/2020    LDLCALC 76 11/12/2020    LABVLDL 27 11/12/2020       Lab Results   Component Value Date    BUN 17 09/01/2021    CREATININE 1.0 09/01/2021     EKG Interpretation:   12/2/15 Sinus rhythm. Incomplete right bundle branch block and anterior fascicular block. Left atrial enlargement. Cannot rule out inferior infarct. 3/29/2016 Sinus rhythm. Nonspecific T wave abnormality. 12/10/19 Sinus bradycardia. Incomplete RBBB. Nonspecific T wave abnormality. 11/19/21 Sinus bradycardia. Incomplete RBBB. Nonspecific T wave abnormality. Image Review:     10/23/15: Echo with bubble study [facility])  Left ventricle: The cavity size was normal. There was mild concentric hypertrophy. Systolic function was normal. The estimated ejection fraction was in the range of 60% to 65%. Wall motion was normal; there were no regional wall motion abnormalities. Doppler parameters are consistent with abnormal left ventricular relaxation (grade 1  diastolic dysfunction). Global longitudinal strain is -17.6%. Aortic valve: Cusp separation was reduced. Transvalvular velocity was increased, due to stenosis. There was severe stenosis. Valve area: 0.68cm^2(VTI). Valve area: 0.69cm^2 (Vmax). Mean gradient: 32mmHg. Peak gradient: 62mmHg.   Ascending aorta: The ascending aorta was dilated. 4/15/16 Echo (post surgery)  Normal LV size and systolic function: EF is 09%. Grade I diastolic dysfunction. A mechanical aortic valve appears well seated with a maximum gradient of 20 mmHg and a mean gradient of 12 mmHg. Trivial aortic regurgitation is present. The aortic root is normal in size. The aorta is within normal limits. The ascending aorta is normal.  Normal right ventricular size. No evidence of tricuspid regurgitation. CT chest 6/2016  Status post repair of previously demonstrated large ascending aortic   aneurysm, with normal caliber on the current exam.  Mild adjacent soft tissue   thickening is likely postoperative. Moderate hiatal hernia. 2.1 cm splenic artery aneurysm. Emphysema.  A 2 mm lingular pulmonary nodule is new as compared to prior, for   which follow-up recommendations are as below. 2/2016 Angiogram  1. Normal right dominant coronary arteries with no evidence of atherosclerotic disease. 2. Normal left ventricular systolic function with an elevated ejection fraction of 60%. 3. Severe aortic stenosis with a mean gradient of 50.9 mmHg. The valve is heavily calcified. Event monitor 5/2016  No sustained arrhythmia. 12 beat run of PSVT. CT chest 6/16/17  Status post repair of previously demonstrated large ascending aortic   aneurysm, with normal caliber on the current exam.  Mild adjacent soft tissue   thickening is likely postoperative. Moderate hiatal hernia. 2.1 cm splenic artery aneurysm. Emphysema.  A 2 mm lingular pulmonary nodule is new as compared to prior, for   which follow-up recommendations are as below. Echo 10/2018  Normal LV size and systolic function. Estimated ejection fraction is 60%. The left atrium is normal in size. A mechanical artificial aortic valve appears well seated with a a mean gradient of 15 mmHg. No evidence of aortic valve regurgitation.   Normal right ventricular size and function. Trivial tricuspid regurgitation. Assessment/Plan:     1) Aortic stenosis secondary to bicuspid valve s/p mechanical AVR 2/16/16. Echocardiogram EF 60%. Mechanical valve well seated. Asymptomatic. Today, will repeat echo to evaluate prosthetic valve function. Continue warfarin and ASA. Warfarin managed by the coumadin clinic. Discussed possibility of home INR monitoring. Instructed to call with any worsening symptoms. 2) Ascending aortic aneurysm s/p Bentall procedure. Repeat CT chest (6/2017) shows normal caliber aorta. No acute intervention. Consider repeating CT chest in future. 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 Currently denies any new neurologic symptoms. 4) Essential hypertension. Goal BP <120/80 with aneurysm. Continue medical therapy. 5) Hyperlipidemia. Continue high intensity statin. 6) COPD. Chest CT shows emphysema. She denies dyspnea. 7) Obesity. BMI 36.56. Encouraged weight loss and increase aerobic exercise as tolerated. Follow up in 1 year. Thank you very much for allowing me to participate in the care of your patient. Please do not hesitate to contact me if you have any questions. Sincerely,  Jake Connell. [facility], [address] [place] [address]  Ph: [phone]  Fax: [phone]    This note was scribed in the presence of the physician by Jasson Malagon RN. Physician Attestation: The scribes documentation has been prepared under my direction and personally reviewed by me in its entirety. I confirm that the note above accurately reflects all work, treatment, procedures, and medical decision making performed by me. All portions of the note including but not limited to the chief complaint, history of present illness, physical exam, assessment and plan/medical decision making were personally reviewed, edited, and updated on the day of the visit.

## 2021-11-02 ENCOUNTER — TELEPHONE (OUTPATIENT)
Dept: FAMILY MEDICINE CLINIC | Age: 61
End: 2021-11-02

## 2021-11-05 ENCOUNTER — TELEPHONE (OUTPATIENT)
Dept: FAMILY MEDICINE CLINIC | Age: 61
End: 2021-11-05

## 2021-11-05 RX ORDER — SULFACETAMIDE SODIUM 100 MG/ML
2 SOLUTION/ DROPS OPHTHALMIC 4 TIMES DAILY
Qty: 10 ML | Refills: 0 | Status: SHIPPED | OUTPATIENT
Start: 2021-11-05 | End: 2021-11-15

## 2021-11-12 ENCOUNTER — ANTI-COAG VISIT (OUTPATIENT)
Dept: PHARMACY | Age: 61
End: 2021-11-12
Payer: COMMERCIAL

## 2021-11-12 DIAGNOSIS — Z98.890 S/P ASCENDING AORTIC ANEURYSM REPAIR: ICD-10-CM

## 2021-11-12 DIAGNOSIS — Z86.79 S/P ASCENDING AORTIC ANEURYSM REPAIR: ICD-10-CM

## 2021-11-12 DIAGNOSIS — Z95.2 S/P AVR (AORTIC VALVE REPLACEMENT): Primary | ICD-10-CM

## 2021-11-12 LAB — INR BLD: 2.8

## 2021-11-12 PROCEDURE — 85610 PROTHROMBIN TIME: CPT

## 2021-11-12 PROCEDURE — 99211 OFF/OP EST MAY X REQ PHY/QHP: CPT

## 2021-11-12 NOTE — PROGRESS NOTES
Ms. Jennifer Mata is a 59 y.o.  female. Ms. Jennifer Mata had an INR test today. Results were reviewed and appropriate warfarin management was completed. This visit was performed as: An in person visit. Protocols were followed with precautions to reduce the spread of COVID-19. Patient verifies current dosing regimen: Yes    Warfarin medication reviewed and updated on the patient 's home medication list: Yes  All other medications reviewed and updated on the patient 's home medication list: No: no changes    Lab Results   Component Value Date    INR 2.80 2021    INR 2.5 10/15/2021    INR 2.50 2021       Patient Findings     Positives:  Change in health    Negatives:  Signs/symptoms of bleeding, Missed doses, Change in medications, Bruising    Comments:  Just got over COVID on Wednesday - symptoms included fever, cough, headache          Anticoagulation Summary  As of 2021    INR goal:  2.5-3.5   TTR:  61.5 % (5.7 y)   INR used for dosin.80 (2021)   Warfarin maintenance plan:  5 mg (2.5 mg x 2) every Mon, Wed, Fri; 2.5 mg (2.5 mg x 1) all other days   Weekly warfarin total:  25 mg   Plan last modified:  Michael Martel RPH (2021)   Next INR check:  12/10/2021   Priority:  Maintenance   Target end date: Indefinite    Indications    S/P AVR (aortic valve replacement)  [Z95.2]  S/P ascending aortic aneurysm repair [N20.538  Z86.79]             Anticoagulation Episode Summary     INR check location:  Anticoagulation Clinic    Preferred lab:      Send INR reminders to:  [facility]    Comments:  EPIC. .. ascending aortic aneurysm repair as well as [place] mechanical AVR 2016. Anticoagulation Care Providers     Provider Role Specialty Phone number    Jay Mcknight MD Referring Cardiology [phone]    Kalpana Zhang MD  Family Medicine [phone]          Warfarin assessment / plan:    Appears well.    No acute findings     No change to warfarin therapy today. She had COVID since her last visit. Quarantine ended on Wednesday. Her symptoms included fever, cough, headaches, and congestion. She is feeling better. INR at goal, so will recheck in 4 weeks. Description    CONTINUE: Warfarin 2.5 mg (one tablet) daily EXCEPT 5 mg (two tablets) every Monday, Wednesday and Friday    Call if they add another round of antibiotics. Enjoy a small serving of greens (usually broccoli) three times a week. Start adding spinach to your salads 2-3 times times per week or eat cooked spinach once a week    Call with medications changes, especially antibiotics and steroids and including any over-the-counter medications or herbal products. Call if you stop any medications. Immunization History   Administered Date(s) Administered    COVID-19, Moderna, Primary or Immunocompromised, PF, 100mcg/0.5mL 03/16/2021, 04/13/2021    Influenza Virus Vaccine 10/01/2016, 10/09/2018    Influenza, Intradermal, Preservative free 09/14/2012, 11/20/2015    Influenza, Intradermal, Quadrivalent, Preservative Free 10/04/2017    Influenza, Quadv, IM, PF (6 mo and older Fluzone, Flulaval, Fluarix, and 3 yrs and older Afluria) 11/12/2020, 10/15/2021    Pneumococcal Polysaccharide ([id number]) 09/18/2015    Polio IPV (IPOL) 09/03/2009    Tdap (Boostrix, Adacel) 09/03/2009, 07/03/2015    Zoster Live (Zostavax) 09/07/2017    Zoster Recombinant (Shingrix) 08/31/2018, 04/12/2019             Reviewed AVS with patient / caregiver.       CLINICAL PHARMACY CONSULT: MED RECONCILIATION/REVIEW ADDENDUM    For Pharmacy Admin Tracking Only     Intervention Detail:    Total # of Interventions Recommended: 0   Total # of Interventions Accepted: 0   Time Spent (min): 15

## 2021-11-19 ENCOUNTER — OFFICE VISIT (OUTPATIENT)
Dept: CARDIOLOGY CLINIC | Age: 61
End: 2021-11-19
Payer: COMMERCIAL

## 2021-11-19 VITALS
BODY MASS INDEX: 36.37 KG/M2 | SYSTOLIC BLOOD PRESSURE: 122 MMHG | HEIGHT: 64 IN | HEART RATE: 68 BPM | WEIGHT: 213 LBS | DIASTOLIC BLOOD PRESSURE: 72 MMHG | OXYGEN SATURATION: 98 %

## 2021-11-19 DIAGNOSIS — I35.0 NONRHEUMATIC AORTIC VALVE STENOSIS: Primary | ICD-10-CM

## 2021-11-19 DIAGNOSIS — I10 ESSENTIAL HYPERTENSION: ICD-10-CM

## 2021-11-19 DIAGNOSIS — I71.21 ASCENDING AORTIC ANEURYSM: ICD-10-CM

## 2021-11-19 DIAGNOSIS — E78.2 MIXED HYPERLIPIDEMIA: ICD-10-CM

## 2021-11-19 DIAGNOSIS — J43.1 PANLOBULAR EMPHYSEMA (HCC): ICD-10-CM

## 2021-11-19 DIAGNOSIS — Z95.2 S/P AVR (AORTIC VALVE REPLACEMENT): ICD-10-CM

## 2021-11-19 PROCEDURE — 99214 OFFICE O/P EST MOD 30 MIN: CPT | Performed by: INTERNAL MEDICINE

## 2021-11-19 PROCEDURE — 93000 ELECTROCARDIOGRAM COMPLETE: CPT | Performed by: INTERNAL MEDICINE

## 2021-11-19 NOTE — PATIENT INSTRUCTIONS
Patient Education        Aortic Valve Stenosis: Care Instructions  Overview     Having aortic valve stenosis means that the valve between your heart and the large blood vessel that carries blood to the body (aorta) has narrowed. That forces the heart to pump harder to get enough blood through the valve. As stenosis gets worse, the valve gets narrower. This can cause symptoms. Symptoms include chest pain, dizziness, fainting, or shortness of breath. Your doctor will check your heart regularly. You may take medicine to lower blood pressure or cholesterol. If the stenosis gets worse, you may choose to have the valve replaced. Follow-up care is a key part of your treatment and safety. Be sure to make and go to all appointments, and call your doctor if you are having problems. It's also a good idea to know your test results and keep a list of the medicines you take. How can you care for yourself at home? · Be safe with medicines. Take your medicines exactly as prescribed. Call your doctor if you think you are having a problem with your medicine. · Call your doctor if you have new symptoms or your symptoms get worse. · Eat heart-healthy foods. These include vegetables, fruits, nuts, beans, lean meat, fish, and whole grains. Limit sodium, alcohol, and sugar. · Be active. Ask your doctor what type and level of exercise is safe for you. · Do not smoke. · Stay at a healthy weight. Lose weight if you need to. · Manage other health problems. If you think you may have a problem with alcohol or drug use, talk to your doctor. · Avoid colds and flu. Get a pneumococcal vaccine shot if you need to. Get a flu vaccine every year. · Take care of your teeth and gums. Get regular dental checkups. Good dental health is important because bacteria can spread from infected teeth and gums to the heart valves. When should you call for help? Call 911 anytime you think you may need emergency care.  For example, call if:    · You passed out (lost consciousness).     · You have symptoms of a heart attack. These may include:  ? Chest pain or pressure, or a strange feeling in the chest.  ? Sweating. ? Shortness of breath. ? Nausea or vomiting. ? Pain, pressure, or a strange feeling in the back, neck, jaw, or upper belly or in one or both shoulders or arms. ? Lightheadedness or sudden weakness. ? A fast or irregular heartbeat. After you call 911, the  may tell you to chew 1 adult-strength or 2 to 4 low-dose aspirin. Wait for an ambulance. Do not try to drive yourself. Call your doctor now or seek immediate medical care if:    · You have new symptoms or your symptoms get worse.     · You have new or increased shortness of breath.     · You are dizzy or lightheaded, or you feel like you may faint.     · You have sudden weight gain, such as more than 2 to 3 pounds in a day or 5 pounds in a week. (Your doctor may suggest a different range of weight gain.)     · You have new or increased swelling in your legs, ankles, or feet.     · You are suddenly so tired or weak that you cannot do your usual activities. Watch closely for changes in your health, and be sure to contact your doctor if you have any problems. Where can you learn more? Go to https://MOBi-LEARN.Wellocities. org and sign in to your [employer] account. Enter O549 in the [employer] box to learn more about \"Aortic Valve Stenosis: Care Instructions. \"     If you do not have an account, please click on the \"Sign Up Now\" link. Current as of: April 29, 2021               Content Version: 13.0  © 9102-3263 Healthwise, Incorporated. Care instructions adapted under license by [facility] ([facility]). If you have questions about a medical condition or this instruction, always ask your healthcare professional. [address] any warranty or liability for your use of this information.

## 2021-12-09 ENCOUNTER — ANTI-COAG VISIT (OUTPATIENT)
Dept: PHARMACY | Age: 61
End: 2021-12-09
Payer: COMMERCIAL

## 2021-12-09 DIAGNOSIS — Z98.890 S/P ASCENDING AORTIC ANEURYSM REPAIR: ICD-10-CM

## 2021-12-09 DIAGNOSIS — Z95.2 S/P AVR (AORTIC VALVE REPLACEMENT): Primary | ICD-10-CM

## 2021-12-09 DIAGNOSIS — Z86.79 S/P ASCENDING AORTIC ANEURYSM REPAIR: ICD-10-CM

## 2021-12-09 LAB — INR BLD: 2.7

## 2021-12-09 PROCEDURE — 99211 OFF/OP EST MAY X REQ PHY/QHP: CPT

## 2021-12-09 PROCEDURE — 85610 PROTHROMBIN TIME: CPT

## 2021-12-09 NOTE — PROGRESS NOTES
Yaa Díaz is a 59 y.o. here for warfarin management. Teresa Arora had an INR test today. Results were reviewed and appropriate warfarin management was completed. This visit was performed as: An in person visit. Protocols were followed with precautions to reduce the spread of COVID-19. Patient verifies current dosing regimen: Yes     Warfarin medication reviewed and updated on the patient 's home medication list: Yes   All other medications reviewed and updated on the patient 's home medication list: No: No changes     Lab Results   Component Value Date    INR 2.70 2021    INR 2.80 2021    INR 2.5 10/15/2021       Patient Findings     Negatives:  Signs/symptoms of bleeding, Missed doses, Change in medications, Change in diet/appetite, Bruising          Anticoagulation Summary  As of 2021    INR goal:  2.5-3.5   TTR:  61.9 % (5.8 y)   INR used for dosin.70 (2021)   Warfarin maintenance plan:  5 mg (2.5 mg x 2) every Mon, Wed, Fri; 2.5 mg (2.5 mg x 1) all other days   Weekly warfarin total:  25 mg   Plan last modified:  Cory Clayton RP (2021)   Next INR check:  2022   Priority:  Maintenance   Target end date: Indefinite    Indications    S/P AVR (aortic valve replacement)  [Z95.2]  S/P ascending aortic aneurysm repair [X80.976  Z86.79]             Anticoagulation Episode Summary     INR check location:  Anticoagulation Clinic    Preferred lab:      Send INR reminders to:  [facility]    Comments:  EPIC. .. ascending aortic aneurysm repair as well as [place] mechanical AVR 2016. Anticoagulation Care Providers     Provider Role Specialty Phone number    Concha Kelly MD Referring Cardiology [phone]    Salma Coyle MD  Family Medicine [phone]          Warfarin assessment / plan:     Appears well. No changes   No acute findings     No change to warfarin therapy today. Patient looks and feels well today.  INR was therapeutic at 2.7. Due to patient being stable on this dose for months, we discussed seeing her every 6 weeks rather than every 4 weeks if she was willing to call us with any medication changes, concerns, or questions. She was agreeable to this plan and her next INR check is 1/21/22. She also mentioned she has discussed INR testing at home with her doctor. We discussed that we would not be able to manage her warfarin if she moved to home testing and to call us if she decides to make this switch. We discussed the COVID booster and she plans to get it at her pharmacy at the end of December. Description    CONTINUE: Warfarin 2.5 mg (one tablet) daily EXCEPT 5 mg (two tablets) every Monday, Wednesday and Friday    Call if they add another round of antibiotics. Enjoy a small serving of greens (usually broccoli) three times a week. Start adding spinach to your salads 2-3 times times per week or eat cooked spinach once a week    Call with medications changes, especially antibiotics and steroids and including any over-the-counter medications or herbal products. Call if you stop any medications. Immunization History   Administered Date(s) Administered    COVID-19, Moderna, Primary or Immunocompromised, PF, 100mcg/0.5mL 03/16/2021, 04/13/2021    Influenza Virus Vaccine 10/01/2016, 10/09/2018    Influenza, Intradermal, Preservative free 09/14/2012, 11/20/2015    Influenza, Intradermal, Quadrivalent, Preservative Free 10/04/2017    Influenza, Quadv, IM, PF (6 mo and older Fluzone, Flulaval, Fluarix, and 3 yrs and older Afluria) 11/12/2020, 10/15/2021    Pneumococcal Polysaccharide ([id number]) 09/18/2015    Polio IPV (IPOL) 09/03/2009    Tdap (Boostrix, Adacel) 09/03/2009, 07/03/2015    Zoster Live (Zostavax) 09/07/2017    Zoster Recombinant (Shingrix) 08/31/2018, 04/12/2019             Reviewed AVS with patient / caregiver.       CLINICAL PHARMACY CONSULT: MED RECONCILIATION/REVIEW ADDENDUM    For Pharmacy Admin Tracking Only     Intervention Detail: Vaccine Recommended/Administered   Total # of Interventions Recommended: 1   Total # of Interventions Accepted: 0   Time Spent (min): 20

## 2021-12-20 RX ORDER — AMOXICILLIN 500 MG/1
2000 CAPSULE ORAL ONCE
Qty: 4 CAPSULE | Refills: 0 | Status: SHIPPED | OUTPATIENT
Start: 2021-12-20 | End: 2021-12-20

## 2021-12-29 ENCOUNTER — HOSPITAL ENCOUNTER (OUTPATIENT)
Dept: NON INVASIVE DIAGNOSTICS | Age: 61
Discharge: HOME | End: 2021-12-29
Payer: COMMERCIAL

## 2021-12-29 PROCEDURE — 93306 TTE W/DOPPLER COMPLETE: CPT

## 2021-12-30 ENCOUNTER — TELEPHONE (OUTPATIENT)
Dept: PHARMACY | Age: 61
End: 2021-12-30

## 2021-12-30 NOTE — TELEPHONE ENCOUNTER
Patient left a voicemail 12/30/21 to tell us she started taking amoxicillin 500 mg TID for 10 days starting 12/28/21 for a tooth abscess. I returned her call to let her know that because her INR was in range at her last appointment and amoxicillin has a low interaction with warfarin we would not be making any adjustments to her warfarin. She was instructed to monitor for unusual bruising or bleeding and to be sure her dentist is aware she takes warfarin if they plan any procedures. She also asked to reschedule her appointment to get her COVID booster so it was rescheduled for 1/20/22.

## 2022-01-04 ENCOUNTER — TELEPHONE (OUTPATIENT)
Dept: PHARMACY | Age: 62
End: 2022-01-04

## 2022-01-04 NOTE — TELEPHONE ENCOUNTER
Shirley Bella called. Endodontist is wanting to do a procedure. Advised to stop aspirin for 21 days prior. Asking about warfarin. I advised to call the endodontist to see If warfarin needs to be held. If so, Lovenox bridging would likely be necessary. This would be determined by Dr. Jeanette Eaton. Once the date and details have been determined, please give us a call.      Yaneli Arias, PharmD,  [facility]  Anticoagulation Service  [phone]

## 2022-01-06 ENCOUNTER — TELEPHONE (OUTPATIENT)
Dept: CARDIOLOGY CLINIC | Age: 62
End: 2022-01-06

## 2022-01-06 NOTE — TELEPHONE ENCOUNTER
Pre-operative risk assessment. Patient is intermediate cardiac risk based on RCRI score of one (CVA) with mechanical aortic valve and aortic root replacement. Patient's risk should not preclude her from proceeding with surgery. Suggest continuation of B-blocker and statin in the kd-operative period. No additional cardiac testing is required before surgery. The duration of holding antiplatelet and anticoagulation therapy is at the discretion of the surgeon. The note indicates the surgeon would like to hold warfarin for 3 days which is acceptable. The note also indicates to hold aspirin for 21 days which seems excessive. Typically aspirin is held for 7 days prior to surgery and resume when possible.

## 2022-01-06 NOTE — LETTER
Geremias Treviño  Phone: [phone]  Fax: [phone]    Michelle Madison MD        January 7, 2022     Patient: Mili Hernandez   YOB: 1960   Date of Visit: 1/6/2022       To Whom It May Concern:    Helena Hernandez Pre-operative risk assessment. Patient is intermediate cardiac risk based on RCRI score of one (CVA) with mechanical aortic valve and aortic root replacement. Patient's risk should not preclude her from proceeding with surgery. Suggest continuation of B-blocker and statin in the kd-operative period. No additional cardiac testing is required before surgery. The duration of holding antiplatelet and anticoagulation therapy is at the discretion of the surgeon. The note indicates the surgeon would like to hold warfarin for 3 days which is acceptable. The note also indicates to hold aspirin for 21 days which seems excessive. Typically aspirin is held for 7 days prior to surgery and resume when possible. If you have any questions or concerns, please don't hesitate to call.     Sincerely,        Michelle Madison MD

## 2022-01-06 NOTE — TELEPHONE ENCOUNTER
Patient planning to have Apiceotomy with Dr Irma Smith and is requesting to hold warfarin x 3 days and Asa x 21 days prior to procedure.      Last office visit 11/19/2021  Hx Aortic stenosis s/p AVR, Ascending aortic aneurysm,CVA,HTN HLD

## 2022-01-11 RX ORDER — METOPROLOL SUCCINATE 25 MG/1
TABLET, EXTENDED RELEASE ORAL
Qty: 90 TABLET | Refills: 3 | Status: SHIPPED | OUTPATIENT
Start: 2022-01-11

## 2022-01-11 RX ORDER — WARFARIN SODIUM 2.5 MG/1
TABLET ORAL
Qty: 270 TABLET | Refills: 3 | Status: SHIPPED | OUTPATIENT
Start: 2022-01-11

## 2022-01-11 NOTE — TELEPHONE ENCOUNTER
Last ov 11/19/21  Pending appt yearly  Last refill metoprolol 1/29/21 #90x4, jantoven historical  Last labs 9/1/21 and 12/9/21 for protime

## 2022-01-12 ENCOUNTER — PATIENT MESSAGE (OUTPATIENT)
Dept: CARDIOLOGY CLINIC | Age: 62
End: 2022-01-12

## 2022-01-12 RX ORDER — AMOXICILLIN 500 MG/1
CAPSULE ORAL
Qty: 4 CAPSULE | Refills: 0 | Status: SHIPPED | OUTPATIENT
Start: 2022-01-12 | End: 2022-05-24 | Stop reason: SDUPTHER

## 2022-01-12 NOTE — TELEPHONE ENCOUNTER
From: Beverley Hernandez  To: Dr. Vincent Staples: 1/12/2022 8:51 AM EST  Subject: upcoming dental procedure     I received your message about stopping the aspirin and warfarin for my upcoming dental procedure and forgot to ask if I also need to take an antibiotic. If I do need to take amoxicillin prior to that appointment, would you please call in the prescription to [facility] at the corner of [address] and [facility] [address].  Thank you   ,

## 2022-01-20 ENCOUNTER — ANTI-COAG VISIT (OUTPATIENT)
Dept: PHARMACY | Age: 62
End: 2022-01-20
Payer: COMMERCIAL

## 2022-01-20 DIAGNOSIS — Z86.79 S/P ASCENDING AORTIC ANEURYSM REPAIR: ICD-10-CM

## 2022-01-20 DIAGNOSIS — Z98.890 S/P ASCENDING AORTIC ANEURYSM REPAIR: ICD-10-CM

## 2022-01-20 DIAGNOSIS — Z95.2 S/P AVR (AORTIC VALVE REPLACEMENT): Primary | ICD-10-CM

## 2022-01-20 LAB — INTERNATIONAL NORMALIZATION RATIO, POC: 3.4

## 2022-01-20 PROCEDURE — 99211 OFF/OP EST MAY X REQ PHY/QHP: CPT

## 2022-01-20 PROCEDURE — 85610 PROTHROMBIN TIME: CPT

## 2022-01-20 NOTE — PROGRESS NOTES
[facility] is a 59 y.o. here for warfarin management. Cielo Aguilar had an INR test today. Results were reviewed and appropriate warfarin management was completed. This visit was performed as: An in person visit. Protocols were followed with precautions to reduce the spread of COVID-19. Patient verifies current dosing regimen: Yes     Warfarin medication reviewed and updated on the patient 's home medication list: Yes   All other medications reviewed and updated on the patient 's home medication list: No: no changes     Lab Results   Component Value Date    INR 3.4 01/20/2022    INR 2.70 12/09/2021    INR 2.80 11/12/2021       Patient Findings     Negatives:  Signs/symptoms of bleeding, Change in medications, Change in diet/appetite, Bruising          Anticoagulation Summary  As of 1/20/2022    INR goal:  2.5-3.5   TTR:  62.7 % (5.9 y)   INR used for dosing:  3.4 (1/20/2022)   Warfarin maintenance plan:  5 mg (2.5 mg x 2) every Mon, Wed, Fri; 2.5 mg (2.5 mg x 1) all other days   Weekly warfarin total:  25 mg   Plan last modified:  Che Ring, [facility] (2/5/2021)   Next INR check:  3/4/2022   Priority:  Maintenance   Target end date: Indefinite    Indications    S/P AVR (aortic valve replacement) 2/16 [Z95.2]  S/P ascending aortic aneurysm repair [V32.676  Z86.79]             Anticoagulation Episode Summary     INR check location:  Anticoagulation Clinic    Preferred lab:      Send INR reminders to:  [facility]    Comments:  EPIC. .. ascending aortic aneurysm repair as well as [place] mechanical AVR 2/2016. Anticoagulation Care Providers     Provider Role Specialty Phone number    Shivam Lopez MD Referring Cardiology [phone]    Aurea Thorne MD  Family Medicine [phone]          Warfarin assessment / plan:     Appears well. No changes   No acute findings     No change to warfarin therapy today. She is interested in starting a hair and nails vitamin.  I do not expect that it will interact with her warfarin. Contains basic vitamins and some other ingredients like argon oil and coconut oil. The ingredients that I was not familiar with and did not come up when checking Lexicomp interactions was horsetail extract and silica. Suggested that she may want to start one week before her next INR and then we can assess the effect at the next visit. Description    CONTINUE: Warfarin 2.5 mg (one tablet) daily EXCEPT 5 mg (two tablets) every Monday, Wednesday and Friday    Call if they add another round of antibiotics. Enjoy a small serving of greens (usually broccoli) three times a week. Start adding spinach to your salads 2-3 times times per week or eat cooked spinach once a week    Call with medications changes, especially antibiotics and steroids and including any over-the-counter medications or herbal products. Call if you stop any medications. Immunization History   Administered Date(s) Administered    COVID-19, Moderna, Primary or Immunocompromised, PF, 100mcg/0.5mL 03/16/2021, 04/13/2021, 01/13/2022    Influenza Virus Vaccine 10/01/2016, 10/09/2018    Influenza, Intradermal, Preservative free 09/14/2012, 11/20/2015    Influenza, Intradermal, Quadrivalent, Preservative Free 10/04/2017    Influenza, Quadv, IM, PF (6 mo and older Fluzone, Flulaval, Fluarix, and 3 yrs and older Afluria) 11/12/2020, 10/15/2021    Pneumococcal Polysaccharide ([id number]) 09/18/2015    Polio IPV (IPOL) 09/03/2009    Tdap (Boostrix, Adacel) 09/03/2009, 07/03/2015    Zoster Live (Zostavax) 09/07/2017    Zoster Recombinant (Shingrix) 08/31/2018, 04/12/2019             Reviewed AVS with patient / caregiver.       CLINICAL PHARMACY CONSULT: MED RECONCILIATION/REVIEW ADDENDUM    For Pharmacy Admin Tracking Only     Intervention Detail:    Total # of Interventions Recommended: 0   Total # of Interventions Accepted: 0   Time Spent (min): 15

## 2022-01-31 RX ORDER — ATORVASTATIN CALCIUM 40 MG/1
TABLET, FILM COATED ORAL
Qty: 90 TABLET | Refills: 1 | Status: SHIPPED | OUTPATIENT
Start: 2022-01-31 | End: 2022-07-22

## 2022-03-04 ENCOUNTER — ANTI-COAG VISIT (OUTPATIENT)
Dept: PHARMACY | Age: 62
End: 2022-03-04
Payer: COMMERCIAL

## 2022-03-04 DIAGNOSIS — Z86.79 S/P ASCENDING AORTIC ANEURYSM REPAIR: ICD-10-CM

## 2022-03-04 DIAGNOSIS — Z98.890 S/P ASCENDING AORTIC ANEURYSM REPAIR: ICD-10-CM

## 2022-03-04 DIAGNOSIS — Z95.2 S/P AVR (AORTIC VALVE REPLACEMENT): Primary | ICD-10-CM

## 2022-03-04 LAB — INR BLD: 3.1

## 2022-03-04 PROCEDURE — 85610 PROTHROMBIN TIME: CPT

## 2022-03-04 PROCEDURE — 99211 OFF/OP EST MAY X REQ PHY/QHP: CPT

## 2022-03-04 NOTE — PROGRESS NOTES
Zenobia Cody is a 59 y.o. here for warfarin management. Rebecca Branch had an INR test today. Results were reviewed and appropriate warfarin management was completed. This visit was performed as: An in person visit. Protocols were followed with precautions to reduce the spread of COVID-19. Patient verifies current dosing regimen: Yes     Warfarin medication reviewed and updated on the patient 's home medication list: Yes   All other medications reviewed and updated on the patient 's home medication list: Yes     Lab Results   Component Value Date    INR 3.10 03/04/2022    INR 3.4 01/20/2022    INR 2.70 12/09/2021       Patient Findings     Negatives:  Signs/symptoms of bleeding, Change in medications, Change in diet/appetite, Bruising          Anticoagulation Summary  As of 3/4/2022    INR goal:  2.5-3.5   TTR:  63.4 % (6 y)   INR used for dosing:  3.10 (3/4/2022)   Warfarin maintenance plan:  5 mg (2.5 mg x 2) every Mon, Wed, Fri; 2.5 mg (2.5 mg x 1) all other days   Weekly warfarin total:  25 mg   Plan last modified:  Sandie Melgar, [address] [place] [address] (2/5/2021)   Next INR check:  4/15/2022   Priority:  Maintenance   Target end date: Indefinite    Indications    S/P AVR (aortic valve replacement) 2/16 [Z95.2]  S/P ascending aortic aneurysm repair [O87.294  Z86.79]             Anticoagulation Episode Summary     INR check location:  Anticoagulation Clinic    Preferred lab:      Send INR reminders to:  [facility]    Comments:  EPIC. .. ascending aortic aneurysm repair as well as [place] mechanical AVR 2/2016. Anticoagulation Care Providers     Provider Role Specialty Phone number    Lindsey Carrel, MD Referring Cardiology [phone]    Latyoa Coffman MD  Family Medicine [phone]          Warfarin assessment / plan:     Appears well. No changes. No acute findings. No change to warfarin therapy today.          Description    CONTINUE: Warfarin 2.5 mg (one tablet) daily EXCEPT 5 mg (two tablets) every Monday, Wednesday and Friday    Call if they add another round of antibiotics. Enjoy a small serving of greens (usually broccoli) three times a week. Start adding spinach to your salads 2-3 times times per week or eat cooked spinach once a week    Call with medications changes, especially antibiotics and steroids and including any over-the-counter medications or herbal products. Call if you stop any medications. Immunization History   Administered Date(s) Administered    COVID-19, Moderna, Primary or Immunocompromised, PF, 100mcg/0.5mL 03/16/2021, 04/13/2021, 01/13/2022    Influenza Virus Vaccine 10/01/2016, 10/09/2018    Influenza, Intradermal, Preservative free 09/14/2012, 11/20/2015    Influenza, Intradermal, Quadrivalent, Preservative Free 10/04/2017    Influenza, Quadv, IM, PF (6 mo and older Fluzone, Flulaval, Fluarix, and 3 yrs and older Afluria) 11/12/2020, 10/15/2021    Pneumococcal Polysaccharide ([id number]) 09/18/2015    Polio IPV (IPOL) 09/03/2009    Tdap (Boostrix, Adacel) 09/03/2009, 07/03/2015    Zoster Live (Zostavax) 09/07/2017    Zoster Recombinant (Shingrix) 08/31/2018, 04/12/2019           Orders Placed This Encounter   Procedures    Protime-INR     This external order was created through the results console. No orders of the defined types were placed in this encounter. Reviewed AVS with patient / caregiver.     Billing Points:  0 billing points this visit       CLINICAL PHARMACY CONSULT: MED RECONCILIATION/REVIEW ADDENDUM    For Pharmacy Admin Tracking Only     Intervention Detail:   Total # of Interventions Recommended: 0   Total # of Interventions Accepted: 0   Time Spent (min): 15

## 2022-04-15 ENCOUNTER — ANTI-COAG VISIT (OUTPATIENT)
Dept: PHARMACY | Age: 62
End: 2022-04-15
Payer: COMMERCIAL

## 2022-04-15 DIAGNOSIS — Z98.890 S/P ASCENDING AORTIC ANEURYSM REPAIR: ICD-10-CM

## 2022-04-15 DIAGNOSIS — Z86.79 S/P ASCENDING AORTIC ANEURYSM REPAIR: ICD-10-CM

## 2022-04-15 DIAGNOSIS — Z95.2 S/P AVR (AORTIC VALVE REPLACEMENT): Primary | ICD-10-CM

## 2022-04-15 LAB — INTERNATIONAL NORMALIZATION RATIO, POC: 2.5

## 2022-04-15 PROCEDURE — 99211 OFF/OP EST MAY X REQ PHY/QHP: CPT

## 2022-04-15 PROCEDURE — 85610 PROTHROMBIN TIME: CPT

## 2022-04-15 NOTE — PROGRESS NOTES
Fausto Ayala is a 59 y.o. here for warfarin management. Emi Montero had an INR test today. Results were reviewed and appropriate warfarin management was completed. This visit was performed as: An in person visit. Protocols were followed with precautions to reduce the spread of COVID-19. Patient verifies current dosing regimen: Yes     Warfarin medication reviewed and updated on the patient 's home medication list: Yes   All other medications reviewed and updated on the patient 's home medication list: No: no changes     Lab Results   Component Value Date    INR 2.5 04/15/2022    INR 3.10 2022    INR 3.4 2022       Patient Findings     Positives:  Change in diet/appetite    Negatives:  Signs/symptoms of thrombosis, Signs/symptoms of bleeding, Change in medications, Bruising    Comments:  Ate less dark green vegetables recently          Anticoagulation Summary  As of 4/15/2022    INR goal:  2.5-3.5   TTR:  64.1 % (6.1 y)   INR used for dosin.5 (4/15/2022)   Warfarin maintenance plan:  5 mg (2.5 mg x 2) every Mon, Wed, Fri; 2.5 mg (2.5 mg x 1) all other days   Weekly warfarin total:  25 mg   Plan last modified:  Gloria Ag, [facility] (2021)   Next INR check:  2022   Priority:  Maintenance   Target end date: Indefinite    Indications    S/P AVR (aortic valve replacement)  [Z95.2]  S/P ascending aortic aneurysm repair [J17.449  Z86.79]             Anticoagulation Episode Summary     INR check location:  Anticoagulation Clinic    Preferred lab:      Send INR reminders to:  [facility]    Comments:  EPIC. .. ascending aortic aneurysm repair as well as [place] mechanical AVR 2016. Anticoagulation Care Providers     Provider Role Specialty Phone number    Karey Scott MD Referring Cardiology [phone]    Azeb Tipton MD  Family Medicine [phone]          Warfarin assessment / plan:     Appears well. No changes. No acute findings.      No change to warfarin therapy today. Description    CONTINUE: Warfarin 2.5 mg (one tablet) daily EXCEPT 5 mg (two tablets) every Monday, Wednesday and Friday    Call if they add another round of antibiotics. Enjoy a small serving of greens (usually broccoli) three times a week. Start adding spinach to your salads 2-3 times times per week or eat cooked spinach once a week    Call with medications changes, especially antibiotics and steroids and including any over-the-counter medications or herbal products. Call if you stop any medications. Immunization History   Administered Date(s) Administered    COVID-19, Moderna, Primary or Immunocompromised, PF, 100mcg/0.5mL 03/16/2021, 04/13/2021, 01/13/2022    Influenza Virus Vaccine 10/01/2016, 10/09/2018    Influenza, Intradermal, Preservative free 09/14/2012, 11/20/2015    Influenza, Intradermal, Quadrivalent, Preservative Free 10/04/2017    Influenza, Quadv, IM, PF (6 mo and older Fluzone, Flulaval, Fluarix, and 3 yrs and older Afluria) 11/12/2020, 10/15/2021    Pneumococcal Polysaccharide ([id number]) 09/18/2015    Polio IPV (IPOL) 09/03/2009    Tdap (Boostrix, Adacel) 09/03/2009, 07/03/2015    Zoster Live (Zostavax) 09/07/2017    Zoster Recombinant (Shingrix) 08/31/2018, 04/12/2019           Orders Placed This Encounter   Procedures    POCT INR     This external order was created through the results console. No orders of the defined types were placed in this encounter. Reviewed AVS with patient / caregiver.     Billing Points:  0 billing points this visit       CLINICAL PHARMACY CONSULT: MED RECONCILIATION/REVIEW ADDENDUM    For Pharmacy Admin Tracking Only     Intervention Detail:    Total # of Interventions Recommended: 0   Total # of Interventions Accepted: 0   Time Spent (min): 10

## 2022-05-24 ENCOUNTER — PATIENT MESSAGE (OUTPATIENT)
Dept: CARDIOLOGY CLINIC | Age: 62
End: 2022-05-24

## 2022-05-24 RX ORDER — AMOXICILLIN 500 MG/1
CAPSULE ORAL
Qty: 4 CAPSULE | Refills: 0 | Status: SHIPPED | OUTPATIENT
Start: 2022-05-24 | End: 2022-06-01 | Stop reason: SDUPTHER

## 2022-05-24 NOTE — TELEPHONE ENCOUNTER
Dr. Princess Stone,    Please sign Rx for patient with AVR (2016), going for 6 month dental cleaning, thanks.

## 2022-05-27 ENCOUNTER — ANTI-COAG VISIT (OUTPATIENT)
Dept: PHARMACY | Age: 62
End: 2022-05-27
Payer: COMMERCIAL

## 2022-05-27 DIAGNOSIS — Z86.79 S/P ASCENDING AORTIC ANEURYSM REPAIR: ICD-10-CM

## 2022-05-27 DIAGNOSIS — Z98.890 S/P ASCENDING AORTIC ANEURYSM REPAIR: ICD-10-CM

## 2022-05-27 DIAGNOSIS — Z95.2 S/P AVR (AORTIC VALVE REPLACEMENT): Primary | ICD-10-CM

## 2022-05-27 LAB — INTERNATIONAL NORMALIZATION RATIO, POC: 3.5

## 2022-05-27 PROCEDURE — 99211 OFF/OP EST MAY X REQ PHY/QHP: CPT

## 2022-05-27 PROCEDURE — 85610 PROTHROMBIN TIME: CPT

## 2022-05-27 NOTE — PROGRESS NOTES
Juanis Mccord is a 59 y.o. here for warfarin management. Miko Das had an INR test today. Results were reviewed and appropriate warfarin management was completed. This visit was performed as: An in person visit. Protocols were followed with precautions to reduce the spread of COVID-19. Patient verifies current dosing regimen: Yes     Warfarin medication reviewed and updated on the patient 's home medication list: Yes   All other medications reviewed and updated on the patient 's home medication list: No: changes     Lab Results   Component Value Date    INR 3.5 05/27/2022    INR 2.5 04/15/2022    INR 3.10 03/04/2022       Patient Findings     Positives:  Change in diet/appetite    Negatives:  Signs/symptoms of thrombosis, Signs/symptoms of bleeding, Change in medications, Bruising    Comments:  Small adjustment to diet to make healthier choices          Anticoagulation Summary  As of 5/27/2022    INR goal:  2.5-3.5   TTR:  64.8 % (6.2 y)   INR used for dosing:  3.5 (5/27/2022)   Warfarin maintenance plan:  5 mg (2.5 mg x 2) every Mon, Wed, Fri; 2.5 mg (2.5 mg x 1) all other days   Weekly warfarin total:  25 mg   Plan last modified:  Kerry Silverio, [facility] (2/5/2021)   Next INR check:  7/8/2022   Priority:  Maintenance   Target end date: Indefinite    Indications    S/P AVR (aortic valve replacement) 2/16 [Z95.2]  S/P ascending aortic aneurysm repair [N72.606  Z86.79]             Anticoagulation Episode Summary     INR check location:  Anticoagulation Clinic    Preferred lab:      Send INR reminders to:  [facility]    Comments:  EPIC. .. ascending aortic aneurysm repair as well as [place] mechanical AVR 2/2016. Anticoagulation Care Providers     Provider Role Specialty Phone number    Leela Pace MD Referring Cardiology [phone]    Karlo Stinson MD  Family Medicine [phone]          Warfarin assessment / plan:     Appears well.    No changes affecting warfarin therapy were noted. No acute findings. INR within goal range. No change to warfarin therapy today. Description    CONTINUE: Warfarin 2.5 mg (one tablet) daily EXCEPT 5 mg (two tablets) every Monday, Wednesday and Friday    Call if they add another round of antibiotics. Enjoy a small serving of greens (usually broccoli) three times a week. Start adding spinach to your salads 2-3 times times per week or eat cooked spinach once a week    Call with medications changes, especially antibiotics and steroids and including any over-the-counter medications or herbal products. Call if you stop any medications. Immunization History   Administered Date(s) Administered    COVID-19, Moderna, Primary or Immunocompromised, PF, 100mcg/0.5mL 03/16/2021, 04/13/2021, 01/13/2022    Influenza Virus Vaccine 10/01/2016, 10/09/2018    Influenza, Intradermal, Preservative free 09/14/2012, 11/20/2015    Influenza, Intradermal, Quadrivalent, Preservative Free 10/04/2017    Influenza, Quadv, IM, PF (6 mo and older Fluzone, Flulaval, Fluarix, and 3 yrs and older Afluria) 11/12/2020, 10/15/2021    Pneumococcal Polysaccharide ([id number]) 09/18/2015    Polio IPV (IPOL) 09/03/2009    Tdap (Boostrix, Adacel) 09/03/2009, 07/03/2015    Zoster Live (Zostavax) 09/07/2017    Zoster Recombinant (Shingrix) 08/31/2018, 04/12/2019           Orders Placed This Encounter   Procedures    POCT INR     This external order was created through the results console. No orders of the defined types were placed in this encounter. Reviewed AVS with patient / caregiver.     Billing Points:  0 billing points this visit       CLINICAL PHARMACY CONSULT: MED RECONCILIATION/REVIEW ADDENDUM    For Pharmacy Admin Tracking Only     Intervention Detail:    Total # of Interventions Recommended: 0   Total # of Interventions Accepted: 0   Time Spent (min): 10

## 2022-06-01 RX ORDER — AMOXICILLIN 500 MG/1
CAPSULE ORAL
Qty: 4 CAPSULE | Refills: 0 | Status: SHIPPED | OUTPATIENT
Start: 2022-06-01

## 2022-07-08 ENCOUNTER — ANTI-COAG VISIT (OUTPATIENT)
Dept: PHARMACY | Age: 62
End: 2022-07-08
Payer: COMMERCIAL

## 2022-07-08 DIAGNOSIS — Z95.2 S/P AVR (AORTIC VALVE REPLACEMENT): Primary | ICD-10-CM

## 2022-07-08 DIAGNOSIS — Z86.79 S/P ASCENDING AORTIC ANEURYSM REPAIR: ICD-10-CM

## 2022-07-08 DIAGNOSIS — Z98.890 S/P ASCENDING AORTIC ANEURYSM REPAIR: ICD-10-CM

## 2022-07-08 LAB — INTERNATIONAL NORMALIZATION RATIO, POC: 2.6

## 2022-07-08 PROCEDURE — 85610 PROTHROMBIN TIME: CPT

## 2022-07-08 PROCEDURE — 99211 OFF/OP EST MAY X REQ PHY/QHP: CPT

## 2022-07-08 NOTE — PROGRESS NOTES
Warfarin assessment / plan:     Appears well. No changes affecting warfarin therapy were noted. No acute findings. INR within goal range. No change to warfarin therapy today. Has a dark bruise on her left arm that was more than she expected after lifting some wood. Instructed to monitor it and call us if it doesn't heal normally or if she has more bruising that is worse than she expects. Description    CONTINUE: Warfarin 2.5 mg (one tablet) daily EXCEPT 5 mg (two tablets) every Monday, Wednesday and Friday    Call if they add another round of antibiotics. Enjoy a small serving of greens (usually broccoli) three times a week. Start adding spinach to your salads 2-3 times times per week or eat cooked spinach once a week    Call with medications changes, especially antibiotics and steroids and including any over-the-counter medications or herbal products. Call if you stop any medications. Immunization History   Administered Date(s) Administered    COVID-19, MODERNA BLUE border, Primary or Immunocompromised, (age 12y+), IM, 100 mcg/0.5mL 03/16/2021, 04/13/2021, 01/13/2022    Influenza Virus Vaccine 10/01/2016, 10/09/2018    Influenza, Intradermal, Preservative free 09/14/2012, 11/20/2015    Influenza, Intradermal, Quadrivalent, Preservative Free 10/04/2017    Influenza, Quadv, IM, PF (6 mo and older Fluzone, Flulaval, Fluarix, and 3 yrs and older Afluria) 11/12/2020, 10/15/2021    Pneumococcal Polysaccharide ([id number]) 09/18/2015    Polio IPV (IPOL) 09/03/2009    Tdap (Boostrix, Adacel) 09/03/2009, 07/03/2015    Zoster Live (Zostavax) 09/07/2017    Zoster Recombinant (Shingrix) 08/31/2018, 04/12/2019           Orders Placed This Encounter   Procedures    POCT INR     This external order was created through the results console. No orders of the defined types were placed in this encounter. Reviewed AVS with patient / caregiver.     Billing Points:  0 billing points this visit       CLINICAL PHARMACY CONSULT: MED RECONCILIATION/REVIEW ADDENDUM    For Pharmacy Admin Tracking Only     Intervention Detail:    Total # of Interventions Recommended: 0   Total # of Interventions Accepted: 0   Time Spent (min): 20

## 2022-07-15 ENCOUNTER — OFFICE VISIT (OUTPATIENT)
Dept: FAMILY MEDICINE CLINIC | Age: 62
End: 2022-07-15
Payer: COMMERCIAL

## 2022-07-15 VITALS
HEART RATE: 62 BPM | WEIGHT: 198.4 LBS | RESPIRATION RATE: 14 BRPM | TEMPERATURE: 98.2 F | DIASTOLIC BLOOD PRESSURE: 62 MMHG | BODY MASS INDEX: 33.87 KG/M2 | OXYGEN SATURATION: 98 % | HEIGHT: 64 IN | SYSTOLIC BLOOD PRESSURE: 126 MMHG

## 2022-07-15 DIAGNOSIS — R30.0 DYSURIA: Primary | ICD-10-CM

## 2022-07-15 LAB
BILIRUBIN, POC: NORMAL
BLOOD URINE, POC: NORMAL
CLARITY, POC: NORMAL
COLOR, POC: YELLOW
GLUCOSE URINE, POC: NORMAL
KETONES, POC: NORMAL
LEUKOCYTE EST, POC: NORMAL
NITRITE, POC: NORMAL
PH, POC: 5.5
PROTEIN, POC: NORMAL
SPECIFIC GRAVITY, POC: 1.02
UROBILINOGEN, POC: 0.2

## 2022-07-15 PROCEDURE — 81002 URINALYSIS NONAUTO W/O SCOPE: CPT

## 2022-07-15 PROCEDURE — 99213 OFFICE O/P EST LOW 20 MIN: CPT

## 2022-07-15 RX ORDER — NITROFURANTOIN 25; 75 MG/1; MG/1
100 CAPSULE ORAL 2 TIMES DAILY
Qty: 20 CAPSULE | Refills: 0 | Status: SHIPPED | OUTPATIENT
Start: 2022-07-15 | End: 2022-07-25

## 2022-07-15 ASSESSMENT — PATIENT HEALTH QUESTIONNAIRE - PHQ9
SUM OF ALL RESPONSES TO PHQ9 QUESTIONS 1 & 2: 0
SUM OF ALL RESPONSES TO PHQ QUESTIONS 1-9: 0
1. LITTLE INTEREST OR PLEASURE IN DOING THINGS: 0
2. FEELING DOWN, DEPRESSED OR HOPELESS: 0

## 2022-07-15 ASSESSMENT — ENCOUNTER SYMPTOMS
VOMITING: 0
NAUSEA: 0

## 2022-07-15 NOTE — PROGRESS NOTES
7/15/2022    This is a 57 y.o. female   Chief Complaint   Patient presents with    Dysuria     Started yesterday. frequency   . Dysuria   This is a new problem. The current episode started yesterday. The problem occurs intermittently. The problem has been gradually worsening. The quality of the pain is described as burning. There has been no fever. She is Not sexually active. Associated symptoms include frequency and urgency. Pertinent negatives include no chills, discharge, flank pain, hematuria, hesitancy, nausea, possible pregnancy, sweats or vomiting. She has tried nothing for the symptoms. There is no history of kidney stones, recurrent UTIs or a single kidney. Patient Active Problem List   Diagnosis    History of CVA with residual deficit- 2010, vision change    Postmenopausal status    Paralytic strabismus, third or oculomotor nerve palsy, total    Mild intermittent asthma without complication    Mixed hyperlipidemia    GERD (gastroesophageal reflux disease)    Allergic rhinitis    Nonrheumatic aortic valve stenosis    Ascending aortic aneurysm (HCC)    S/P AVR (aortic valve replacement) 2/16    Chronic anticoagulation for AVR- INR 2.5-3.5    Hyperglycemia    Family history of colon cancer in mother    Arthritis of left foot    S/P ascending aortic aneurysm repair    Essential hypertension    Panlobular emphysema (HCC)          Current Outpatient Medications   Medication Sig Dispense Refill    nitrofurantoin, macrocrystal-monohydrate, (MACROBID) 100 MG capsule Take 1 capsule by mouth in the morning and 1 capsule before bedtime. Do all this for 10 days.  20 capsule 0    atorvastatin (LIPITOR) 40 MG tablet TAKE 1 TABLET NIGHTLY 90 tablet 1    metoprolol succinate (TOPROL XL) 25 MG extended release tablet TAKE 1 TABLET DAILY 90 tablet 3    warfarin (JANTOVEN) 2.5 MG tablet Warfarin 2.5 mg (one tablet) daily EXCEPT 5 mg (two tablets) every Monday, Wednesday and Friday 270 tablet 3    cetirizine (ZYRTEC) 10 MG tablet Take 10 mg by mouth as needed       fluticasone (FLONASE) 50 MCG/ACT nasal spray 2 sprays by Nasal route daily as needed       aspirin 81 MG chewable tablet Take 81 mg by mouth nightly       amoxicillin (AMOXIL) 500 mg capsule Take 4 tablets 30-60 minutes prior to your dental procedure. (Patient not taking: Reported on 7/15/2022) 4 capsule 0    famotidine (PEPCID) 20 MG tablet Take 20 mg by mouth 2 times daily       No current facility-administered medications for this visit. Allergies   Allergen Reactions    Dye [Iodides] Rash       Review of Systems   Constitutional:  Negative for chills. Gastrointestinal:  Negative for nausea and vomiting. Genitourinary:  Positive for dysuria, frequency and urgency. Negative for flank pain, hematuria and hesitancy. Vitals:    07/15/22 1312   BP: 126/62   Site: Right Upper Arm   Position: Sitting   Cuff Size: Large Adult   Pulse: 62   Resp: 14   Temp: 98.2 °F (36.8 °C)   TempSrc: Oral   SpO2: 98%   Weight: 198 lb 6.4 oz (90 kg)   Height: 5' 4\" (1.626 m)       Body mass index is 34.06 kg/m². Wt Readings from Last 3 Encounters:   07/15/22 198 lb 6.4 oz (90 kg)   11/19/21 213 lb (96.6 kg)   11/03/21 210 lb 12.8 oz (95.6 kg)       BP Readings from Last 3 Encounters:   07/15/22 126/62   11/19/21 122/72   11/03/21 112/76       Physical Exam  Vitals reviewed. Constitutional:       General: She is not in acute distress. Appearance: Normal appearance. HENT:      Head: Normocephalic and atraumatic. Cardiovascular:      Rate and Rhythm: Normal rate and regular rhythm. Heart sounds: Normal heart sounds. No murmur heard. No friction rub. No gallop. Pulmonary:      Effort: Pulmonary effort is normal. No respiratory distress. Breath sounds: Normal breath sounds. Abdominal:      Tenderness: There is no abdominal tenderness. There is no right CVA tenderness or left CVA tenderness. Musculoskeletal:         General: Normal range of motion. Right lower leg: No edema. Left lower leg: No edema. Skin:     General: Skin is warm and dry. Neurological:      Mental Status: She is oriented to person, place, and time. Psychiatric:         Behavior: Behavior normal.         Thought Content: Thought content normal.         Judgment: Judgment normal.       Assessmentand Plan  Reinaldo Baker was seen today for dysuria. Diagnoses and all orders for this visit:    Dysuria  -     POCT Urinalysis no Micro  -     nitrofurantoin, macrocrystal-monohydrate, (MACROBID) 100 MG capsule; Take 1 capsule by mouth in the morning and 1 capsule before bedtime. Do all this for 10 days. -     Culture, Urine  Urine dipstick shows negative for nitrites, glucose, protein, ketones, positive for leukocytes, red blood cells. Will send urine for C&S  Macrobid BID x10 days- will adjust accordingly once sensitivities come back  Hydrate with water    Return in 5 days (on 7/20/2022), or if symptoms worsen or fail to improve.

## 2022-07-18 LAB
ORGANISM: ABNORMAL
URINE CULTURE, ROUTINE: ABNORMAL

## 2022-07-22 RX ORDER — ATORVASTATIN CALCIUM 40 MG/1
TABLET, FILM COATED ORAL
Qty: 90 TABLET | Refills: 1 | Status: SHIPPED | OUTPATIENT
Start: 2022-07-22

## 2022-07-29 ENCOUNTER — OFFICE VISIT (OUTPATIENT)
Dept: FAMILY MEDICINE CLINIC | Age: 62
End: 2022-07-29
Payer: COMMERCIAL

## 2022-07-29 VITALS
BODY MASS INDEX: 33.8 KG/M2 | SYSTOLIC BLOOD PRESSURE: 104 MMHG | WEIGHT: 198 LBS | HEART RATE: 79 BPM | OXYGEN SATURATION: 98 % | DIASTOLIC BLOOD PRESSURE: 76 MMHG | HEIGHT: 64 IN

## 2022-07-29 DIAGNOSIS — I10 ESSENTIAL HYPERTENSION: Primary | ICD-10-CM

## 2022-07-29 DIAGNOSIS — K21.9 GASTROESOPHAGEAL REFLUX DISEASE WITHOUT ESOPHAGITIS: ICD-10-CM

## 2022-07-29 DIAGNOSIS — J30.9 ALLERGIC RHINITIS, UNSPECIFIED SEASONALITY, UNSPECIFIED TRIGGER: ICD-10-CM

## 2022-07-29 DIAGNOSIS — E78.2 MIXED HYPERLIPIDEMIA: ICD-10-CM

## 2022-07-29 DIAGNOSIS — Z79.01 CHRONIC ANTICOAGULATION: ICD-10-CM

## 2022-07-29 PROCEDURE — 99214 OFFICE O/P EST MOD 30 MIN: CPT

## 2022-07-29 ASSESSMENT — ENCOUNTER SYMPTOMS
SHORTNESS OF BREATH: 0
ABDOMINAL PAIN: 0

## 2022-07-29 NOTE — PROGRESS NOTES
7/29/2022    This is a 57 y.o. female   Chief Complaint   Patient presents with    Follow-up     F/u, not seen in a while   . HPI  Here for routine follow up. Doing well. Planning [place] cruise- leaves next week    Urinary symptoms have resolved    GERD- has lost weight and this has improved    BP is well controlled on current medications. On chronic anticoag- Follows with Dr. Kaushal Naranjo, cardiologist     Patient Active Problem List   Diagnosis    History of CVA with residual deficit- 2010, vision change    Postmenopausal status    Paralytic strabismus, third or oculomotor nerve palsy, total    Mild intermittent asthma without complication    Mixed hyperlipidemia    GERD (gastroesophageal reflux disease)    Allergic rhinitis    Nonrheumatic aortic valve stenosis    Ascending aortic aneurysm (HCC)    S/P AVR (aortic valve replacement) 2/16    Chronic anticoagulation for AVR- INR 2.5-3.5    Hyperglycemia    Family history of colon cancer in mother    Arthritis of left foot    S/P ascending aortic aneurysm repair    Essential hypertension    Panlobular emphysema (HCC)          Current Outpatient Medications   Medication Sig Dispense Refill    atorvastatin (LIPITOR) 40 MG tablet TAKE 1 TABLET NIGHTLY 90 tablet 1    metoprolol succinate (TOPROL XL) 25 MG extended release tablet TAKE 1 TABLET DAILY 90 tablet 3    warfarin (JANTOVEN) 2.5 MG tablet Warfarin 2.5 mg (one tablet) daily EXCEPT 5 mg (two tablets) every Monday, Wednesday and Friday 270 tablet 3    cetirizine (ZYRTEC) 10 MG tablet Take 10 mg by mouth as needed       fluticasone (FLONASE) 50 MCG/ACT nasal spray 2 sprays by Nasal route daily as needed       aspirin 81 MG chewable tablet Take 81 mg by mouth nightly       amoxicillin (AMOXIL) 500 mg capsule Take 4 tablets 30-60 minutes prior to your dental procedure. (Patient not taking: No sig reported) 4 capsule 0     No current facility-administered medications for this visit.        Allergies   Allergen Reactions    Dye [Iodides] Rash       Review of Systems   Constitutional:  Negative for activity change and fever. HENT:          Left ear pressure   Respiratory:  Negative for shortness of breath. Cardiovascular:  Negative for chest pain, palpitations and leg swelling. Gastrointestinal:  Negative for abdominal pain. Neurological:  Negative for dizziness and headaches. Vitals:    07/29/22 1109   BP: 104/76   Site: Right Upper Arm   Position: Sitting   Cuff Size: Large Adult   Pulse: 79   SpO2: 98%   Weight: 198 lb (89.8 kg)   Height: 5' 4\" (1.626 m)       Body mass index is 33.99 kg/m². Wt Readings from Last 3 Encounters:   07/29/22 198 lb (89.8 kg)   07/15/22 198 lb 6.4 oz (90 kg)   11/19/21 213 lb (96.6 kg)       BP Readings from Last 3 Encounters:   07/29/22 104/76   07/15/22 126/62   11/19/21 122/72       Physical Exam  Vitals reviewed. Constitutional:       General: She is not in acute distress. Appearance: Normal appearance. HENT:      Head: Normocephalic and atraumatic. Right Ear: Hearing, tympanic membrane, ear canal and external ear normal. No middle ear effusion. Tympanic membrane is not bulging. Left Ear: Hearing, tympanic membrane, ear canal and external ear normal.  No middle ear effusion. Tympanic membrane is not bulging. Cardiovascular:      Rate and Rhythm: Normal rate and regular rhythm. Heart sounds: Normal heart sounds. No murmur heard. No friction rub. No gallop. Pulmonary:      Effort: Pulmonary effort is normal. No respiratory distress. Breath sounds: Normal breath sounds. Musculoskeletal:         General: Normal range of motion. Right lower leg: No edema. Left lower leg: No edema. Skin:     General: Skin is warm and dry. Neurological:      Mental Status: She is oriented to person, place, and time. Psychiatric:         Behavior: Behavior normal.         Thought Content:  Thought content normal.         Judgment: Judgment normal.       Assessmentand Plan  Salomón Sloan was seen today for follow-up. Diagnoses and all orders for this visit:    Essential hypertension  -     Comprehensive Metabolic Panel; Future  Well controlled- managed by cardiologist, Dr. Cielo Montgomery    Mixed hyperlipidemia  -     Lipid Panel; Future  -     Comprehensive Metabolic Panel;  Future  Due for fasting labs  Tolerating atorvastatin    Chronic anticoagulation  -     CBC with Auto Differential; Future  Stable- Followed by Dr. Cielo Montgomery    Gastroesophageal reflux disease without esophagitis  Improving with weight loss    Allergic rhinitis, unspecified seasonality, unspecified trigger  Well controlled with current meds    Plan to get Prevnar 20 when she returns from [facility] booster at your pharmacy  Get flu vaccine in the fall  Mammogram due in October    Return in about 1 year (around 7/29/2023) for physical.

## 2022-08-02 DIAGNOSIS — I10 ESSENTIAL HYPERTENSION: ICD-10-CM

## 2022-08-02 DIAGNOSIS — Z79.01 CHRONIC ANTICOAGULATION: ICD-10-CM

## 2022-08-02 DIAGNOSIS — E78.2 MIXED HYPERLIPIDEMIA: ICD-10-CM

## 2022-08-02 LAB
A/G RATIO: 2.1 (ref 1.1–2.2)
ALBUMIN SERPL-MCNC: 4.2 G/DL (ref 3.4–5)
ALP BLD-CCNC: 89 U/L (ref 40–129)
ALT SERPL-CCNC: 22 U/L (ref 10–40)
ANION GAP SERPL CALCULATED.3IONS-SCNC: 11 MMOL/L (ref 3–16)
AST SERPL-CCNC: 31 U/L (ref 15–37)
BASOPHILS ABSOLUTE: 0.1 K/UL (ref 0–0.2)
BASOPHILS RELATIVE PERCENT: 1.1 %
BILIRUB SERPL-MCNC: 1.6 MG/DL (ref 0–1)
BUN BLDV-MCNC: 15 MG/DL (ref 7–20)
CALCIUM SERPL-MCNC: 8.9 MG/DL (ref 8.3–10.6)
CHLORIDE BLD-SCNC: 105 MMOL/L (ref 99–110)
CHOLESTEROL, TOTAL: 154 MG/DL (ref 0–199)
CO2: 26 MMOL/L (ref 21–32)
CREAT SERPL-MCNC: 0.8 MG/DL (ref 0.6–1.2)
EOSINOPHILS ABSOLUTE: 0.5 K/UL (ref 0–0.6)
EOSINOPHILS RELATIVE PERCENT: 9.9 %
GFR AFRICAN AMERICAN: >60
GFR NON-AFRICAN AMERICAN: >60
GLUCOSE BLD-MCNC: 93 MG/DL (ref 70–99)
HCT VFR BLD CALC: 41.5 % (ref 36–48)
HDLC SERPL-MCNC: 58 MG/DL (ref 40–60)
HEMOGLOBIN: 14.1 G/DL (ref 12–16)
LDL CHOLESTEROL CALCULATED: 75 MG/DL
LYMPHOCYTES ABSOLUTE: 1.3 K/UL (ref 1–5.1)
LYMPHOCYTES RELATIVE PERCENT: 25.5 %
MCH RBC QN AUTO: 29.9 PG (ref 26–34)
MCHC RBC AUTO-ENTMCNC: 34.1 G/DL (ref 31–36)
MCV RBC AUTO: 87.7 FL (ref 80–100)
MONOCYTES ABSOLUTE: 0.4 K/UL (ref 0–1.3)
MONOCYTES RELATIVE PERCENT: 7.6 %
NEUTROPHILS ABSOLUTE: 2.9 K/UL (ref 1.7–7.7)
NEUTROPHILS RELATIVE PERCENT: 55.9 %
PDW BLD-RTO: 13.8 % (ref 12.4–15.4)
PLATELET # BLD: 182 K/UL (ref 135–450)
PMV BLD AUTO: 9.7 FL (ref 5–10.5)
POTASSIUM SERPL-SCNC: 4.3 MMOL/L (ref 3.5–5.1)
RBC # BLD: 4.74 M/UL (ref 4–5.2)
SODIUM BLD-SCNC: 142 MMOL/L (ref 136–145)
TOTAL PROTEIN: 6.2 G/DL (ref 6.4–8.2)
TRIGL SERPL-MCNC: 103 MG/DL (ref 0–150)
VLDLC SERPL CALC-MCNC: 21 MG/DL
WBC # BLD: 5.2 K/UL (ref 4–11)

## 2022-08-15 ENCOUNTER — OFFICE VISIT (OUTPATIENT)
Dept: FAMILY MEDICINE CLINIC | Age: 62
End: 2022-08-15
Payer: COMMERCIAL

## 2022-08-15 VITALS
OXYGEN SATURATION: 98 % | BODY MASS INDEX: 33.97 KG/M2 | SYSTOLIC BLOOD PRESSURE: 134 MMHG | WEIGHT: 199 LBS | DIASTOLIC BLOOD PRESSURE: 66 MMHG | HEART RATE: 66 BPM | HEIGHT: 64 IN

## 2022-08-15 DIAGNOSIS — R22.42 LOCALIZED SWELLING OF LEFT FOOT: Primary | ICD-10-CM

## 2022-08-15 PROCEDURE — 99213 OFFICE O/P EST LOW 20 MIN: CPT | Performed by: FAMILY MEDICINE

## 2022-08-15 SDOH — ECONOMIC STABILITY: FOOD INSECURITY: WITHIN THE PAST 12 MONTHS, YOU WORRIED THAT YOUR FOOD WOULD RUN OUT BEFORE YOU GOT MONEY TO BUY MORE.: NEVER TRUE

## 2022-08-15 SDOH — ECONOMIC STABILITY: FOOD INSECURITY: WITHIN THE PAST 12 MONTHS, THE FOOD YOU BOUGHT JUST DIDN'T LAST AND YOU DIDN'T HAVE MONEY TO GET MORE.: NEVER TRUE

## 2022-08-15 ASSESSMENT — SOCIAL DETERMINANTS OF HEALTH (SDOH): HOW HARD IS IT FOR YOU TO PAY FOR THE VERY BASICS LIKE FOOD, HOUSING, MEDICAL CARE, AND HEATING?: NOT HARD AT ALL

## 2022-08-15 NOTE — PROGRESS NOTES
[facility] VISIT NOTE     Subjective:     Chief Complaint   Patient presents with    Foot Swelling     Left foot swelling - started yesterday morning. Worse last night. Not to bad today. Betina Colon is a 57 y.o. female who presents swelling left foot over MTP joints. Began after new shoes 3 d ago. Toes felt pressure. Duration 1 day  Denies injury    Health Maintenance Due   Topic Date Due    Pneumococcal 0-64 years Vaccine (2 - PCV) 09/18/2016    COVID-19 Vaccine (4 - Booster for Moderna series) 05/13/2022     CHART REVIEW   reports that she quit smoking about 23 years ago. Her smoking use included cigarettes. She has a 21.00 pack-year smoking history. She has never used smokeless tobacco.  Health Maintenance Due   Topic Date Due    Pneumococcal 0-64 years Vaccine (2 - PCV) 09/18/2016    COVID-19 Vaccine (4 - Booster for Moderna series) 05/13/2022     Current Outpatient Medications   Medication Instructions    amoxicillin (AMOXIL) 500 mg capsule Take 4 tablets 30-60 minutes prior to your dental procedure.     aspirin 81 mg, Oral, NIGHTLY    atorvastatin (LIPITOR) 40 MG tablet TAKE 1 TABLET NIGHTLY    cetirizine (ZYRTEC) 10 mg, Oral, PRN    fluticasone (FLONASE) 50 MCG/ACT nasal spray 2 sprays, Nasal, DAILY PRN    metoprolol succinate (TOPROL XL) 25 MG extended release tablet TAKE 1 TABLET DAILY    warfarin (JANTOVEN) 2.5 MG tablet Warfarin 2.5 mg (one tablet) daily EXCEPT 5 mg (two tablets) every Monday, Wednesday and Friday     LAST LABS  LDL Calculated   Date Value Ref Range Status   08/02/2022 75 <100 mg/dL Final     Lab Results   Component Value Date    HDL 58 08/02/2022     Lab Results   Component Value Date    TRIG 103 08/02/2022     Lab Results   Component Value Date     08/02/2022    K 4.3 08/02/2022    CREATININE 0.8 08/02/2022     Lab Results   Component Value Date    WBC 5.2 08/02/2022    HGB 14.1 08/02/2022     08/02/2022     Lab Results   Component Value Date    ALT 22 08/02/2022 AST 31 08/02/2022    ALKPHOS 89 08/02/2022    BILITOT 1.6 (H) 08/02/2022     TSH (uIU/mL)   Date Value   09/07/2017 2.50     Lab Results   Component Value Date    LABA1C 5.3 03/13/2019    LABA1C 5.6 08/31/2018    LABA1C 5.4 03/07/2018     Objective:   PHYSICAL EXAM   /66 (Site: Left Upper Arm, Position: Sitting, Cuff Size: Large Adult)   Pulse 66   Ht 5' 4\" (1.626 m)   Wt 199 lb (90.3 kg)   LMP 02/09/2006   SpO2 98%   BMI 34.16 kg/m²   BP Readings from Last 5 Encounters:   08/15/22 134/66   07/29/22 104/76   07/15/22 126/62   11/19/21 122/72   11/03/21 112/76     Wt Readings from Last 5 Encounters:   08/15/22 199 lb (90.3 kg)   07/29/22 198 lb (89.8 kg)   07/15/22 198 lb 6.4 oz (90 kg)   11/19/21 213 lb (96.6 kg)   11/03/21 210 lb 12.8 oz (95.6 kg)      GENERAL:   well-developed, well-nourished, alert, no distress. Left foot - no swelling or tenderness now  Purple hemotomas below 1st and 2nd toenails     Assessment and Plan:      Diagnosis Orders   1. Localized swelling of left foot           INSTRUCTIONS  Return if getting pain.

## 2022-08-19 ENCOUNTER — ANTI-COAG VISIT (OUTPATIENT)
Dept: PHARMACY | Age: 62
End: 2022-08-19
Payer: COMMERCIAL

## 2022-08-19 DIAGNOSIS — Z98.890 S/P ASCENDING AORTIC ANEURYSM REPAIR: ICD-10-CM

## 2022-08-19 DIAGNOSIS — Z86.79 S/P ASCENDING AORTIC ANEURYSM REPAIR: ICD-10-CM

## 2022-08-19 DIAGNOSIS — Z95.2 S/P AVR (AORTIC VALVE REPLACEMENT): Primary | ICD-10-CM

## 2022-08-19 LAB — INR BLD: 3.1

## 2022-08-19 PROCEDURE — 99211 OFF/OP EST MAY X REQ PHY/QHP: CPT

## 2022-08-19 PROCEDURE — 85610 PROTHROMBIN TIME: CPT

## 2022-08-19 NOTE — PROGRESS NOTES
Delmi Laboy is a 57 y.o. here for warfarin management. Tone Jarvis had an INR test today. Results were reviewed and appropriate warfarin management was completed. This visit was performed as: An in person visit. Protocols were followed with precautions to reduce the spread of COVID-19. Patient verifies current dosing regimen: Yes     Warfarin medication reviewed and updated on the patient 's home medication list: Yes   All other medications reviewed and updated on the patient 's home medication list: No: no changes     Lab Results   Component Value Date    INR 3.10 08/19/2022    INR 2.6 07/08/2022    INR 3.5 05/27/2022       Patient Findings       Negatives:  Signs/symptoms of thrombosis, Signs/symptoms of bleeding, Change in health, Missed doses, Change in medications, Change in diet/appetite, Bruising            Anticoagulation Summary  As of 8/19/2022      INR goal:  2.5-3.5   TTR:  66.1 % (6.5 y)   INR used for dosing:  3.10 (8/19/2022)   Warfarin maintenance plan:  5 mg (2.5 mg x 2) every Mon, Wed, Fri; 2.5 mg (2.5 mg x 1) all other days   Weekly warfarin total:  25 mg   Plan last modified:  Ulises Peña, [facility] (2/5/2021)   Next INR check:  9/30/2022   Priority:  Maintenance   Target end date: Indefinite    Indications    S/P AVR (aortic valve replacement) 2/16 [Z95.2]  S/P ascending aortic aneurysm repair [E96.664  Z86.79]                 Anticoagulation Episode Summary       INR check location:  Anticoagulation Clinic    Preferred lab:      Send INR reminders to:  [facility]    Comments:  EPIC. .. ascending aortic aneurysm repair as well as [place] mechanical AVR 2/2016. Anticoagulation Care Providers       Provider Role Specialty Phone number    Alvin Haynes MD Referring Cardiology [phone]    Aquilino Walls MD  Family Medicine [phone]            Warfarin assessment / plan:     Appears well. No changes affecting warfarin therapy were noted. No acute findings. INR within goal range. No change to warfarin therapy today. Will see patient back in 6 weeks    Description    CONTINUE: Warfarin 2.5 mg (one tablet) daily EXCEPT 5 mg (two tablets) every Monday, Wednesday and Friday    Call if they add another round of antibiotics. Enjoy a small serving of greens (usually broccoli) three times a week. Start adding spinach to your salads 2-3 times times per week or eat cooked spinach once a week    Call with medications changes, especially antibiotics and steroids and including any over-the-counter medications or herbal products. Call if you stop any medications. Immunization History   Administered Date(s) Administered    COVID-19, MODERNA BLUE border, Primary or Immunocompromised, (age 12y+), IM, 100 mcg/0.5mL 03/16/2021, 04/13/2021, 01/13/2022    INFLUENZA, INTRADERMAL, QUADRIVALENT, PRESERVATIVE FREE 10/04/2017    Influenza Virus Vaccine 09/03/2009, 10/01/2016, 10/09/2018    Influenza, FLUARIX, FLULAVAL, (age 10 mo+) AND AFLURIA, FLUZONE (age 1 y+), PF 11/12/2020, 10/15/2021    Influenza, Intradermal, Preservative free 09/14/2012, 11/20/2015    Pneumococcal Polysaccharide ([id number]) 09/18/2015    Polio IPV (IPOL) 09/03/2009    Tdap (Boostrix, Adacel) 09/03/2009, 07/03/2015    Zoster Live (Zostavax) 09/07/2017    Zoster Recombinant (Shingrix) 08/31/2018, 04/12/2019           Orders Placed This Encounter   Procedures    Protime-INR     This external order was created through the results console. No orders of the defined types were placed in this encounter. Reviewed AVS with patient / caregiver.     Billing Points:  0 billing points this visit       CLINICAL PHARMACY CONSULT: MED RECONCILIATION/REVIEW ADDENDUM    For Pharmacy Admin Tracking Only    Intervention Detail:   Total # of Interventions Recommended: 0  Total # of Interventions Accepted: 0  Time Spent (min): 15

## 2022-09-30 ENCOUNTER — ANTI-COAG VISIT (OUTPATIENT)
Dept: PHARMACY | Age: 62
End: 2022-09-30
Payer: COMMERCIAL

## 2022-09-30 DIAGNOSIS — Z98.890 S/P ASCENDING AORTIC ANEURYSM REPAIR: ICD-10-CM

## 2022-09-30 DIAGNOSIS — Z86.79 S/P ASCENDING AORTIC ANEURYSM REPAIR: ICD-10-CM

## 2022-09-30 DIAGNOSIS — Z95.2 S/P AVR (AORTIC VALVE REPLACEMENT): Primary | ICD-10-CM

## 2022-09-30 LAB — INTERNATIONAL NORMALIZATION RATIO, POC: 2

## 2022-09-30 PROCEDURE — 85610 PROTHROMBIN TIME: CPT

## 2022-09-30 PROCEDURE — 99211 OFF/OP EST MAY X REQ PHY/QHP: CPT

## 2022-09-30 NOTE — PROGRESS NOTES
Mandi Greer is a 57 y.o. here for warfarin management. Darnell Cullen had an INR test today. Results were reviewed and appropriate warfarin management was completed. This visit was performed as: An in person visit. Protocols were followed with precautions to reduce the spread of COVID-19. Patient verifies current dosing regimen: Yes     Warfarin medication reviewed and updated on the patient 's home medication list: Yes   All other medications reviewed and updated on the patient 's home medication list: No: no changes     Lab Results   Component Value Date    INR 2.0 2022    INR 3.10 2022    INR 2.6 2022       Patient Findings       Positives:  Missed doses    Negatives:  Signs/symptoms of thrombosis, Signs/symptoms of bleeding, Change in medications, Change in diet/appetite, Bruising    Comments:  May have missed warfarin dose             Anticoagulation Summary  As of 2022      INR goal:  2.5-3.5   TTR:  65.8 % (6.6 y)   INR used for dosin.0 (2022)   Warfarin maintenance plan:  5 mg (2.5 mg x 2) every Mon, Wed, Fri; 2.5 mg (2.5 mg x 1) all other days   Weekly warfarin total:  25 mg   Plan last modified:  Boogie Burroughs, [address] [facility] (2021)   Next INR check:  2022   Priority:  Maintenance   Target end date: Indefinite    Indications    S/P AVR (aortic valve replacement)  [Z95.2]  S/P ascending aortic aneurysm repair [T47.343  Z86.79]                 Anticoagulation Episode Summary       INR check location:  Anticoagulation Clinic    Preferred lab:      Send INR reminders to:  [facility]    Comments:  EPIC. .. ascending aortic aneurysm repair as well as [place] mechanical AVR 2016.               Anticoagulation Care Providers       Provider Role Specialty Phone number    Bren Arechiga MD Referring Cardiology [phone]    Estelle Moreno MD  Family Medicine [phone]            Warfarin assessment / plan:     Appears well  Sub-therapeutic INR     Denies increased vitamin K intake. Denies medication changes. Denies signs or symptoms of clotting. Denies signs or symptoms of a stroke     Missed dose(s) likely missed 9/26 dose. Jamal Yu has been stable at this dose for at least 8 months. INR below goal likely due to missed dose. Will have her take a one time higher dose and then resume her regular weekly warfarin dose. Will reassess at her next visit. Description    Take Warfarin 5mg today as planned and then take a higher dose of 5mg tomorrow  THEN CONTINUE: Warfarin 2.5 mg (one tablet) daily EXCEPT 5 mg (two tablets) every Monday, Wednesday and Friday    Enjoy a small serving of greens (usually broccoli) three times a week. Start adding spinach to your salads 2-3 times times per week or eat cooked spinach once a week    Call with medications changes, especially antibiotics and steroids and including any over-the-counter medications or herbal products. Call if you stop any medications. Immunization History   Administered Date(s) Administered    COVID-19, MODERNA BLUE border, Primary or Immunocompromised, (age 12y+), IM, 100 mcg/0.5mL 03/16/2021, 04/13/2021, 01/13/2022    INFLUENZA, INTRADERMAL, QUADRIVALENT, PRESERVATIVE FREE 10/04/2017    Influenza Virus Vaccine 09/03/2009, 10/01/2016, 10/09/2018    Influenza, FLUARIX, FLULAVAL, FLUZONE (age 10 mo+) AND AFLURIA, (age 1 y+), PF, 0.5mL 11/12/2020, 10/15/2021    Influenza, Intradermal, Preservative free 09/14/2012, 11/20/2015    Pneumococcal Polysaccharide ([id number]) 09/18/2015    Polio IPV (IPOL) 09/03/2009    Tdap (Boostrix, Adacel) 09/03/2009, 07/03/2015    Zoster Live (Zostavax) 09/07/2017    Zoster Recombinant (Shingrix) 08/31/2018, 04/12/2019           Orders Placed This Encounter   Procedures    POCT INR     This external order was created through the results console. No orders of the defined types were placed in this encounter.        Reviewed AVS with patient / caregiver.     Billing Points:  Adjust dosage and/or reconcile meds (fill pill box) </= 5 medications - 2 points       CLINICAL PHARMACY CONSULT: MED RECONCILIATION/REVIEW ADDENDUM    For Pharmacy Admin Tracking Only    Intervention Detail: Dose Adjustment: 1, reason: Therapy Optimization  Total # of Interventions Recommended: 1  Total # of Interventions Accepted: 1  Time Spent (min): 15

## 2022-11-11 ENCOUNTER — ANTI-COAG VISIT (OUTPATIENT)
Dept: PHARMACY | Age: 62
End: 2022-11-11
Payer: COMMERCIAL

## 2022-11-11 DIAGNOSIS — Z86.79 S/P ASCENDING AORTIC ANEURYSM REPAIR: ICD-10-CM

## 2022-11-11 DIAGNOSIS — Z95.2 S/P AVR (AORTIC VALVE REPLACEMENT): Primary | ICD-10-CM

## 2022-11-11 DIAGNOSIS — Z98.890 S/P ASCENDING AORTIC ANEURYSM REPAIR: ICD-10-CM

## 2022-11-11 LAB — INTERNATIONAL NORMALIZATION RATIO, POC: 3.7

## 2022-11-11 PROCEDURE — 85610 PROTHROMBIN TIME: CPT

## 2022-11-11 PROCEDURE — 99211 OFF/OP EST MAY X REQ PHY/QHP: CPT

## 2022-11-11 NOTE — PROGRESS NOTES
Neeta Lopez is a 57 y.o. here for warfarin management. Liliana Bess had an INR test today. Results were reviewed and appropriate warfarin management was completed. This visit was performed as: An in person visit. Protocols were followed with precautions to reduce the spread of COVID-19. Patient verifies current warfarin dosing regimen: Yes     Warfarin medication reviewed and updated on the patient 's home medication list: Yes   All other medications reviewed and updated on the patient 's home medication list: No: no changes     Lab Results   Component Value Date    INR 3.7 11/11/2022    INR 2.0 09/30/2022    INR 3.10 08/19/2022       Patient Findings       Negatives:  Signs/symptoms of thrombosis, Signs/symptoms of bleeding, Change in medications, Change in diet/appetite, Bruising            Anticoagulation Summary  As of 11/11/2022      INR goal:  2.5-3.5   TTR:  65.7 % (6.7 y)   INR used for dosing:  3.7 (11/11/2022)   Warfarin maintenance plan:  5 mg (2.5 mg x 2) every Mon, Wed, Fri; 2.5 mg (2.5 mg x 1) all other days; Starting 11/11/2022   Weekly warfarin total:  25 mg   Plan last modified:  Chirag Meade RPH (2/5/2021)   Next INR check:  12/9/2022   Priority:  Maintenance   Target end date: Indefinite    Indications    S/P AVR (aortic valve replacement) 2/16 [Z95.2]  S/P ascending aortic aneurysm repair [P64.632  Z86.79]                 Anticoagulation Episode Summary       INR check location:  Anticoagulation Clinic    Preferred lab:      Send INR reminders to:  [facility]    Comments:  EPIC. .. ascending aortic aneurysm repair as well as [place] mechanical AVR 2/2016. Anticoagulation Care Providers       Provider Role Specialty Phone number    Amarilys Mabry MD Referring Cardiology [phone]    Tiffany Louis MD  Family Medicine [phone]            [facility] 02/09/2008     Warfarin assessment / plan:     Appears well  Supra-therapeutic INR.      Denies signs and symptoms of bleeding/bruising. Denies medication changes. Denies extra warfarin doses. Denies decrease in vitamin K intake. No reason for the increase in INR. Last INR was below goal range. Advised she could take a lower dose today or eat more green vegetables today. Otherwise continue the same weekly dose. Description    You can take a lower 2.5mg dose today OR eat a little more green vegetables today and take your 5mg dose  THEN CONTINUE: Warfarin 2.5 mg (one tablet) daily EXCEPT 5 mg (two tablets) every Monday, Wednesday and Friday    Enjoy a small serving of greens (usually broccoli) three times a week. Start adding spinach to your salads 2-3 times times per week or eat cooked spinach once a week    Call with medications changes, especially antibiotics and steroids and including any over-the-counter medications or herbal products. Call if you stop any medications. Immunization History   Administered Date(s) Administered    COVID-19, MODERNA BLUE border, Primary or Immunocompromised, (age 12y+), IM, 100 mcg/0.5mL 03/16/2021, 04/13/2021, 01/13/2022    INFLUENZA, INTRADERMAL, QUADRIVALENT, PRESERVATIVE FREE 10/04/2017    Influenza Virus Vaccine 09/03/2009, 10/01/2016, 10/09/2018    Influenza, FLUARIX, FLULAVAL, FLUZONE (age 10 mo+) AND AFLURIA, (age 1 y+), PF, 0.5mL 11/12/2020, 10/15/2021    Influenza, Intradermal, Preservative free 09/14/2012, 11/20/2015    Pneumococcal Polysaccharide ([id number]) 09/18/2015    Polio IPV (IPOL) 09/03/2009    Tdap (Boostrix, Adacel) 09/03/2009, 07/03/2015    Zoster Live (Zostavax) 09/07/2017    Zoster Recombinant (Shingrix) 08/31/2018, 04/12/2019           Orders Placed This Encounter   Procedures    POCT INR     This external order was created through the results console. No orders of the defined types were placed in this encounter. Reviewed AVS with patient / caregiver.     Billing Points:  Adjust dosage and/or reconcile meds (fill pill box) </= 5 medications - 2 points       CLINICAL PHARMACY CONSULT: MED RECONCILIATION/REVIEW ADDENDUM    For Pharmacy Admin Tracking Only    Intervention Detail: Dose Adjustment: 1, reason: Therapy De-escalation  Total # of Interventions Recommended: 1  Total # of Interventions Accepted: 1  Time Spent (min): 10

## 2022-12-06 RX ORDER — AMOXICILLIN 500 MG/1
CAPSULE ORAL
Qty: 4 CAPSULE | Refills: 0 | OUTPATIENT
Start: 2022-12-06

## 2022-12-07 ENCOUNTER — TELEPHONE (OUTPATIENT)
Dept: FAMILY MEDICINE CLINIC | Age: 62
End: 2022-12-07

## 2022-12-07 NOTE — TELEPHONE ENCOUNTER
She wants cough med called in for Tessalon. She last had it in 11/2021. Has the flu and a cough. Will you fill this or have her get OTC cough med?

## 2022-12-07 NOTE — TELEPHONE ENCOUNTER
----- Message from Cecily Perdomo sent at 12/7/2022  8:18 AM EST -----  Subject: Message to Provider    QUESTIONS  Information for Provider? patient has the flu and she has a persistant   cough and was wanting to see if she could prescribe the benzonatate for   her cough  ---------------------------------------------------------------------------  --------------  Eliana BRAVO  [phone]; OK to leave message on voicemail  ---------------------------------------------------------------------------  --------------  SCRIPT ANSWERS  Relationship to Patient?  Self

## 2022-12-07 NOTE — TELEPHONE ENCOUNTER
----- Message from Camille Ángela sent at 12/7/2022  8:18 AM EST -----  Subject: Message to Provider    QUESTIONS  Information for Provider? patient has the flu and she has a persistant   cough and was wanting to see if she could prescribe the benzonatate for   her cough  ---------------------------------------------------------------------------  --------------  Kelsey BRAVO  [phone]; OK to leave message on voicemail  ---------------------------------------------------------------------------  --------------  SCRIPT ANSWERS  Relationship to Patient?  Self

## 2022-12-08 RX ORDER — BENZONATATE 100 MG/1
100-200 CAPSULE ORAL 3 TIMES DAILY PRN
Qty: 30 CAPSULE | Refills: 0 | Status: SHIPPED | OUTPATIENT
Start: 2022-12-08 | End: 2022-12-15

## 2022-12-08 NOTE — TELEPHONE ENCOUNTER
Please notify patient that prescription was e-scribed to pharmacy     Please be seen if symptoms getting worse after 7 days from onset, last longer than 10 days, having high fevers, having trouble breathing in chest or extremely ill.

## 2022-12-09 RX ORDER — AMOXICILLIN 500 MG/1
CAPSULE ORAL
Qty: 4 CAPSULE | Refills: 0 | Status: SHIPPED | OUTPATIENT
Start: 2022-12-09

## 2022-12-09 NOTE — TELEPHONE ENCOUNTER
Medication Refill    Medication needing refilled: amoxicillin (AMOXIL)     Dosage of the medication: 500 mg capsule      How are you taking this medication (QD, BID, TID, QID, PRN):Take 4 tablets 30-60 minutes prior to your dental procedure.     30 or 90 day supply called in:    When will you run out of your medication: now    Which Pharmacy are we sending the medication to?:    [address], [address] [place] [id number]   Phone:  [phone]  Fax:  [phone]

## 2022-12-23 ENCOUNTER — APPOINTMENT (OUTPATIENT)
Dept: PHARMACY | Age: 62
End: 2022-12-23
Payer: COMMERCIAL

## 2022-12-30 ENCOUNTER — ANTI-COAG VISIT (OUTPATIENT)
Dept: PHARMACY | Age: 62
End: 2022-12-30
Payer: COMMERCIAL

## 2022-12-30 DIAGNOSIS — Z98.890 S/P ASCENDING AORTIC ANEURYSM REPAIR: ICD-10-CM

## 2022-12-30 DIAGNOSIS — Z95.2 S/P AVR (AORTIC VALVE REPLACEMENT): Primary | ICD-10-CM

## 2022-12-30 DIAGNOSIS — Z86.79 S/P ASCENDING AORTIC ANEURYSM REPAIR: ICD-10-CM

## 2022-12-30 LAB — INTERNATIONAL NORMALIZATION RATIO, POC: 2.6

## 2022-12-30 PROCEDURE — 85610 PROTHROMBIN TIME: CPT

## 2022-12-30 PROCEDURE — 99211 OFF/OP EST MAY X REQ PHY/QHP: CPT

## 2022-12-30 NOTE — PROGRESS NOTES
Wandy Pool is a 57 y.o. here for warfarin management. Nani Rubinstein had an INR test today. Results were reviewed and appropriate warfarin management was completed. This visit was performed as: An in person visit. Protocols were followed with precautions to reduce the spread of COVID-19. Patient verifies current warfarin dosing regimen: Yes     Warfarin medication reviewed and updated on the patient 's home medication list: Yes   All other medications reviewed and updated on the patient 's home medication list: No: no changes     Lab Results   Component Value Date    INR 2.6 2022    INR 3.7 2022    INR 2.0 2022       Patient Findings       Negatives:  Signs/symptoms of thrombosis, Signs/symptoms of bleeding, Change in medications, Change in diet/appetite, Bruising            Anticoagulation Summary  As of 2022      INR goal:  2.5-3.5   TTR:  66.0 % (6.8 y)   INR used for dosin.6 (2022)   Warfarin maintenance plan:  5 mg (2.5 mg x 2) every Mon, Wed, Fri; 2.5 mg (2.5 mg x 1) all other days; Starting 2022   Weekly warfarin total:  25 mg   Plan last modified:  Kristyn Anand [facility] (2021)   Next INR check:  2023   Priority:  Maintenance   Target end date: Indefinite    Indications    S/P AVR (aortic valve replacement)  [Z95.2]  S/P ascending aortic aneurysm repair [E55.395  Z86.79]                 Anticoagulation Episode Summary       INR check location:  Anticoagulation Clinic    Preferred lab:      Send INR reminders to:  [facility]AFF    Comments:  EPIC. .. ascending aortic aneurysm repair as well as [place] mechanical AVR 2016. Anticoagulation Care Providers       Provider Role Specialty Phone number    Gayatri Galan MD Referring Cardiology [phone]    Lily Malin MD  Family Medicine [phone]            [facility] 2008     Warfarin assessment / plan:     Appears well.       No changes affecting warfarin therapy were noted. No acute findings. INR within goal range. No change to warfarin dosing today. Description    CONTINUE: Warfarin 2.5 mg (one tablet) daily EXCEPT 5 mg (two tablets) every Monday, Wednesday and Friday    Enjoy a small serving of greens (usually broccoli) three times a week. Start adding spinach to your salads 2-3 times times per week or eat cooked spinach once a week    Call with medications changes, especially antibiotics and steroids and including any over-the-counter medications or herbal products. Call if you stop any medications. Immunization History   Administered Date(s) Administered    COVID-19, MODERNA BLUE border, Primary or Immunocompromised, (age 12y+), IM, 100 mcg/0.5mL 03/16/2021, 04/13/2021, 01/13/2022    INFLUENZA, INTRADERMAL, QUADRIVALENT, PRESERVATIVE FREE 10/04/2017    Influenza Virus Vaccine 09/03/2009, 10/01/2016, 10/09/2018    Influenza, FLUARIX, FLULAVAL, FLUZONE (age 10 mo+) AND AFLURIA, (age 1 y+), PF, 0.5mL 11/12/2020, 10/15/2021    Influenza, Intradermal, Preservative free 09/14/2012, 11/20/2015    Pneumococcal Polysaccharide ([id number]) 09/18/2015    Polio IPV (IPOL) 09/03/2009    Tdap (Boostrix, Adacel) 09/03/2009, 07/03/2015    Zoster Live (Zostavax) 09/07/2017    Zoster Recombinant (Shingrix) 08/31/2018, 04/12/2019           Orders Placed This Encounter   Procedures    POCT INR     This external order was created through the results console. No orders of the defined types were placed in this encounter. Reviewed AVS with patient / caregiver.     Billing Points:  0 billing points this visit       CLINICAL PHARMACY CONSULT: MED RECONCILIATION/REVIEW ADDENDUM    For Pharmacy Admin Tracking Only    Intervention Detail:   Total # of Interventions Recommended: 0  Total # of Interventions Accepted: 0  Time Spent (min): 15

## 2023-01-05 RX ORDER — AMOXICILLIN 500 MG/1
CAPSULE ORAL
Qty: 4 CAPSULE | Refills: 0 | Status: SHIPPED | OUTPATIENT
Start: 2023-01-05

## 2023-01-06 RX ORDER — WARFARIN SODIUM 2.5 MG/1
TABLET ORAL
Qty: 130 TABLET | Refills: 0 | Status: SHIPPED | OUTPATIENT
Start: 2023-01-06

## 2023-01-06 RX ORDER — METOPROLOL SUCCINATE 25 MG/1
TABLET, EXTENDED RELEASE ORAL
Qty: 30 TABLET | Refills: 0 | Status: SHIPPED | OUTPATIENT
Start: 2023-01-06

## 2023-01-06 NOTE — TELEPHONE ENCOUNTER
Last O/V:  11/19/2021  Next O/V:  01/10/2023      Called and spoke to pt, she stated no one called her for a yearly follow up. Pt new appointment is 01/10/2023.

## 2023-01-10 ENCOUNTER — TELEPHONE (OUTPATIENT)
Dept: CARDIOLOGY CLINIC | Age: 63
End: 2023-01-10

## 2023-01-10 ENCOUNTER — OFFICE VISIT (OUTPATIENT)
Dept: CARDIOLOGY CLINIC | Age: 63
End: 2023-01-10
Payer: COMMERCIAL

## 2023-01-10 VITALS
RESPIRATION RATE: 14 BRPM | BODY MASS INDEX: 35.17 KG/M2 | HEART RATE: 69 BPM | DIASTOLIC BLOOD PRESSURE: 72 MMHG | HEIGHT: 64 IN | SYSTOLIC BLOOD PRESSURE: 122 MMHG | OXYGEN SATURATION: 95 % | WEIGHT: 206 LBS

## 2023-01-10 DIAGNOSIS — I35.0 NONRHEUMATIC AORTIC VALVE STENOSIS: Primary | ICD-10-CM

## 2023-01-10 DIAGNOSIS — Z95.2 S/P AVR (AORTIC VALVE REPLACEMENT): ICD-10-CM

## 2023-01-10 DIAGNOSIS — I71.21 ANEURYSM OF ASCENDING AORTA WITHOUT RUPTURE: ICD-10-CM

## 2023-01-10 DIAGNOSIS — Z79.01 CHRONIC ANTICOAGULATION: ICD-10-CM

## 2023-01-10 DIAGNOSIS — E78.2 MIXED HYPERLIPIDEMIA: ICD-10-CM

## 2023-01-10 PROCEDURE — 3078F DIAST BP <80 MM HG: CPT | Performed by: INTERNAL MEDICINE

## 2023-01-10 PROCEDURE — 99214 OFFICE O/P EST MOD 30 MIN: CPT | Performed by: INTERNAL MEDICINE

## 2023-01-10 PROCEDURE — 93000 ELECTROCARDIOGRAM COMPLETE: CPT | Performed by: INTERNAL MEDICINE

## 2023-01-10 PROCEDURE — 3074F SYST BP LT 130 MM HG: CPT | Performed by: INTERNAL MEDICINE

## 2023-01-10 RX ORDER — FAMOTIDINE 10 MG
TABLET ORAL
Qty: 3 TABLET | Refills: 0 | Status: SHIPPED | OUTPATIENT
Start: 2023-01-10

## 2023-01-10 RX ORDER — PREDNISONE 20 MG/1
TABLET ORAL
Qty: 6 TABLET | Refills: 0 | Status: SHIPPED | OUTPATIENT
Start: 2023-01-10

## 2023-01-10 RX ORDER — DIPHENHYDRAMINE HCL 25 MG
CAPSULE ORAL
Qty: 3 CAPSULE | Refills: 0 | Status: SHIPPED | OUTPATIENT
Start: 2023-01-10

## 2023-01-10 NOTE — PROGRESS NOTES
[facility]      Cardiology Progress Note    Xavi Santos  1960    January 10, 2023     Primary care physician: Юлия Coleman MD  Reason for Visit: Aortic stenosis and aneurysm repair    CC: \"Things are good. \"     HPI:  The patient is 57 y.o. 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 Originally, she was referred based on an abnormal echocardiogram. She was seen by her PCP for her annual check up and an echocardiogram was ordered to follow up on a heart murmur. The work-up showed a bicuspid aortic valve with severe stenosis and a severely dilated ascending aorta. She underwent a Bentall procedure on 2/16/16 at [facility] with Dr. Gerald Renteria. She had a syncopal episode of uncertain etiology in 0/2486 but denies recurrence. Today, she states overall she is doing well. She denies any new cardiac complaints and states she continues to remain active without any exertional symptoms. She denies any chest pains or worsening shortness of breath. She is following with the coumadin clinic to manage her PT/INR. She reports compliance with her medications and tolerating. She denies excessive bruising or unusual bleeding. Patient denies exertional chest pain/pressure, dyspnea at rest, NICHOLS, PND, orthopnea, palpitations, lightheadedness, weight changes, changes in LE edema, and syncope.     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 internal carotid artery. clipped. WISDOM TOOTH EXTRACTION       Family History   Problem Relation Age of Onset    High Blood Pressure Father     Heart Failure Father     Coronary Art Dis Father         MI - nonsmoker ; PPM and defibrillator     Mult Sclerosis Father     Cancer Mother         lymphoma    Mult Sclerosis Sister     Diabetes Maternal Grandfather      Social History     Tobacco Use    Smoking status: Former     Packs/day: 1.00     Years: 21.00     Pack years: 21.00     Types: Cigarettes     Quit date: 1999     Years since quittin.0    Smokeless tobacco: Never    Tobacco comments:     started age 23   Vaping Use    Vaping Use: Never used   Substance Use Topics    Alcohol use: Yes     Alcohol/week: 1.0 standard drink     Types: 1 Glasses of wine per week     Comment: 1 per month     Drug use: No     Comment: never     Allergies   Allergen Reactions    Dye [Iodides] Rash     Current Outpatient Medications   Medication Sig Dispense Refill    metoprolol succinate (TOPROL XL) 25 MG extended release tablet TAKE 1 TABLET DAILY 30 tablet 0    warfarin (JANTOVEN) 2.5 MG tablet TAKE 1 TABLET DAILY EXCEPT 2 TABLETS EVERY MONDAY,    WEDNESDAY, AND FRIDAY 130 tablet 0    atorvastatin (LIPITOR) 40 MG tablet TAKE 1 TABLET NIGHTLY 90 tablet 1    aspirin 81 MG chewable tablet Take 81 mg by mouth nightly        No current facility-administered medications for this visit. Review of Systems:  Constitutional: no unanticipated weight loss.  There's been no change in energy level, sleep pattern, or activity level. No fevers, chills. Eyes: No visual changes or diplopia. No scleral icterus. ENT: No Headaches, hearing loss or vertigo. No mouth sores or sore throat. Cardiovascular: as reviewed in HPI  Respiratory: No cough or wheezing, no sputum production. No hematemesis. Gastrointestinal: No abdominal pain, appetite loss, blood in stools. No change in bowel or bladder habits. Genitourinary: No dysuria, trouble voiding, or hematuria. Musculoskeletal:  No gait disturbance, no joint complaints. Integumentary: No rash or pruritis. Neurological: No headache, diplopia, change in muscle strength, numbness or tingling. Psychiatric: No anxiety or depression. Endocrine: No temperature intolerance. No excessive thirst, fluid intake, or urination. No tremor. Hematologic/Lymphatic: No abnormal bruising or bleeding, blood clots or swollen lymph nodes. Allergic/Immunologic: No nasal congestion or hives. Physical Exam:   /72   Pulse 69   Resp 14   Ht 5' 4\" (1.626 m)   Wt 206 lb (93.4 kg)   LMP 02/09/2006   SpO2 95%   BMI 35.36 kg/m²   Wt Readings from Last 3 Encounters:   01/10/23 206 lb (93.4 kg)   08/15/22 199 lb (90.3 kg)   07/29/22 198 lb (89.8 kg)     Constitutional: She is oriented to person, place, and time. She appears well-developed and well-nourished. In no acute distress. Head: Normocephalic and atraumatic. Pupils equal and round. Neck: Neck supple. No JVP or carotid bruit appreciated. No mass and no thyromegaly present. No lymphadenopathy present. Cardiovascular: Normal rate. Exam reveals no gallop and no friction rub. Mechanical heart sounds. Pulmonary/Chest: Effort normal and breath sounds normal. No respiratory distress. She has no wheezes, rhonchi or rales. Abdominal: Soft, non-tender. Bowel sounds are normal. She exhibits no organomegaly, mass or bruit. Extremities: Trace BLE edema. No cyanosis or clubbing.  Pulses are 2+ radial/dorsalis pedis/posterior tibial/carotid bilaterally. Neurological: No gross cranial nerve deficit. Coordination normal.   Skin: Skin is warm and dry. There is no rash or diaphoresis. Psychiatric: She has a normal mood and affect. Her speech is normal and behavior is normal.     Lab Review:   Lab Results   Component Value Date/Time    TRIG 103 08/02/2022 07:48 AM    HDL 58 08/02/2022 07:48 AM    LDLCALC 75 08/02/2022 07:48 AM    LABVLDL 21 08/02/2022 07:48 AM       Lab Results   Component Value Date/Time    BUN 15 08/02/2022 07:48 AM    CREATININE 0.8 08/02/2022 07:48 AM     EKG Interpretation:   12/2/15 Sinus rhythm. Incomplete right bundle branch block and anterior fascicular block. Left atrial enlargement. Cannot rule out inferior infarct. 3/29/2016 Sinus rhythm. Nonspecific T wave abnormality. 12/10/19 Sinus bradycardia. Incomplete RBBB. Nonspecific T wave abnormality. 11/19/21 Sinus bradycardia. Incomplete RBBB. Nonspecific T wave abnormality. 1/10/23 Sinus rhythm. Incomplete RBBB. Nonspecific ST-T wave abnormality. Image Review:     10/23/15: Echo with bubble study [facility])  Left ventricle: The cavity size was normal. There was mild concentric hypertrophy. Systolic function was normal. The estimated ejection fraction was in the range of 60% to 65%. Wall motion was normal; there were no regional wall motion abnormalities. Doppler parameters are consistent with abnormal left ventricular relaxation (grade 1  diastolic dysfunction). Global longitudinal strain is -17.6%. Aortic valve: Cusp separation was reduced. Transvalvular velocity was increased, due to stenosis. There was severe stenosis. Valve area: 0.68cm^2(VTI). Valve area: 0.69cm^2 (Vmax). Mean gradient: 32mmHg. Peak gradient: 62mmHg. Ascending aorta: The ascending aorta was dilated. 4/15/16 Echo (post surgery)  Normal LV size and systolic function: EF is 50%. Grade I diastolic dysfunction.   A mechanical aortic valve appears well seated with a maximum gradient of 20 mmHg and a mean gradient of 12 mmHg. Trivial aortic regurgitation is present. The aortic root is normal in size. The aorta is within normal limits. The ascending aorta is normal.  Normal right ventricular size. No evidence of tricuspid regurgitation. CT chest 6/2016  Status post repair of previously demonstrated large ascending aortic   aneurysm, with normal caliber on the current exam.  Mild adjacent soft tissue   thickening is likely postoperative. Moderate hiatal hernia. 2.1 cm splenic artery aneurysm. Emphysema. A 2 mm lingular pulmonary nodule is new as compared to prior, for   which follow-up recommendations are as below. 2/2016 Angiogram  1. Normal right dominant coronary arteries with no evidence of atherosclerotic disease. 2. Normal left ventricular systolic function with an elevated ejection fraction of 60%. 3. Severe aortic stenosis with a mean gradient of 50.9 mmHg. The valve is heavily calcified. Event monitor 5/2016  No sustained arrhythmia. 12 beat run of PSVT. CT chest 6/16/17  Status post repair of previously demonstrated large ascending aortic   aneurysm, with normal caliber on the current exam.  Mild adjacent soft tissue   thickening is likely postoperative. Moderate hiatal hernia. 2.1 cm splenic artery aneurysm. Emphysema. A 2 mm lingular pulmonary nodule is new as compared to prior, for   which follow-up recommendations are as below. Echo 10/2018  Normal LV size and systolic function. Estimated ejection fraction is 60%. The left atrium is normal in size. A mechanical artificial aortic valve appears well seated with a a mean gradient of 15 mmHg. No evidence of aortic valve regurgitation. Normal right ventricular size and function. Trivial tricuspid regurgitation. Echo 12/29/21  Normal left ventricle size, wall thickness, and systolic function with an  estimated ejection fraction of 60-65%.  No regional wall motion abnormalities are seen. Normal right ventricular size and function. A mechanical artificial aortic valve appears well seated with a maximum velocity of 2.47 m/s and a mean gradient of 15 mmHg. Assessment/Plan:     1) Aortic stenosis secondary to bicuspid valve s/p mechanical AVR 2/16/16. Echocardiogram EF 60%. Mechanical valve well seated. Asymptomatic. Continue warfarin and ASA. Warfarin managed by the coumadin clinic. Instructed to call with any worsening symptoms. 2) Ascending aortic aneurysm s/p Bentall procedure. CT chest (6/2017) shows normal caliber aorta. No acute intervention. Will repeat CTA of the chest.     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 Currently denies any new neurologic symptoms. 4) Essential hypertension. Controlled. Goal BP <120/80 with aneurysm. Continue medical therapy. 5) Hyperlipidemia. Continue high intensity statin with Lipitor 40 mg daily. 6) COPD. Chest CT shows emphysema. She denies dyspnea. 7) Obesity. BMI 35. Encouraged weight loss and increase aerobic exercise as tolerated. Follow up in 1 year    Thank you very much for allowing me to participate in the care of your patient. Please do not hesitate to contact me if you have any questions. Sincerely,  Levell Peer. Manuel Arias, [address] [place] [address], [address] [place] [address]  Ph: [phone]  Fax: [phone]    This note was scribed in the presence of Dr Manuel Arias MD by Opal Campoverde RN. Physician Attestation: The scribes documentation has been prepared under my direction and personally reviewed by me in its entirety. I confirm that the note above accurately reflects all work, treatment, procedures, and medical decision making performed by me.    All portions of the note including but not limited to the chief complaint, history of present illness, physical exam, assessment and plan/medical decision making were personally reviewed, edited, and updated on the day of the visit.

## 2023-01-10 NOTE — TELEPHONE ENCOUNTER
Emmanuel Fritz is now scheduled for January 30, 2023 arriving at 7:45 test time 8:00 at [facility]  vu date/time/location/prep

## 2023-01-10 NOTE — TELEPHONE ENCOUNTER
Please facilitate schedule Chest CTA, patient will need pre medication prior due to iodine allergy. Please call patient and also let her know I sent the premedication prescriptions to her pharmacy as discussed in office.

## 2023-01-27 RX ORDER — ATORVASTATIN CALCIUM 40 MG/1
TABLET, FILM COATED ORAL
Qty: 90 TABLET | Refills: 1 | Status: SHIPPED | OUTPATIENT
Start: 2023-01-27

## 2023-01-30 ENCOUNTER — HOSPITAL ENCOUNTER (OUTPATIENT)
Dept: CT IMAGING | Age: 63
Discharge: HOME | End: 2023-01-30
Payer: COMMERCIAL

## 2023-01-30 DIAGNOSIS — I71.21 ANEURYSM OF ASCENDING AORTA WITHOUT RUPTURE: ICD-10-CM

## 2023-01-30 DIAGNOSIS — Z95.2 S/P AVR (AORTIC VALVE REPLACEMENT): ICD-10-CM

## 2023-01-30 DIAGNOSIS — I35.0 NONRHEUMATIC AORTIC VALVE STENOSIS: ICD-10-CM

## 2023-01-30 LAB
GFR SERPL CREATININE-BSD FRML MDRD: >60 ML/MIN/{1.73_M2}
PERFORMED ON: NORMAL
PERFORMED ON: NORMAL
POC CREATININE: 0.8 MG/DL (ref 0.6–1.2)
POC SAMPLE TYPE: NORMAL
POC SAMPLE TYPE: NORMAL

## 2023-01-30 PROCEDURE — 82565 ASSAY OF CREATININE: CPT

## 2023-01-30 PROCEDURE — 71275 CT ANGIOGRAPHY CHEST: CPT

## 2023-01-30 PROCEDURE — 6360000004 HC RX CONTRAST MEDICATION: Performed by: INTERNAL MEDICINE

## 2023-01-30 RX ADMIN — IOPAMIDOL 75 ML: 755 INJECTION, SOLUTION INTRAVENOUS at 07:52

## 2023-02-10 ENCOUNTER — ANTI-COAG VISIT (OUTPATIENT)
Dept: PHARMACY | Age: 63
End: 2023-02-10
Payer: COMMERCIAL

## 2023-02-10 DIAGNOSIS — Z95.2 S/P AVR (AORTIC VALVE REPLACEMENT): Primary | ICD-10-CM

## 2023-02-10 DIAGNOSIS — Z86.79 S/P ASCENDING AORTIC ANEURYSM REPAIR: ICD-10-CM

## 2023-02-10 DIAGNOSIS — Z98.890 S/P ASCENDING AORTIC ANEURYSM REPAIR: ICD-10-CM

## 2023-02-10 LAB — INTERNATIONAL NORMALIZATION RATIO, POC: 3.5

## 2023-02-10 PROCEDURE — 99211 OFF/OP EST MAY X REQ PHY/QHP: CPT | Performed by: SPEECH-LANGUAGE PATHOLOGIST

## 2023-02-10 PROCEDURE — 85610 PROTHROMBIN TIME: CPT | Performed by: SPEECH-LANGUAGE PATHOLOGIST

## 2023-02-10 NOTE — PROGRESS NOTES
Sussy Vera is a 57 y.o. here for warfarin management. Nina Anglin had an INR test today. Results were reviewed and appropriate warfarin management was completed. This visit was performed as: An in person visit. Protocols were followed with precautions to reduce the spread of COVID-19. Patient verifies current warfarin dosing regimen: Yes     Warfarin medication reviewed and updated on the patient 's home medication list: Yes   All other medications reviewed and updated on the patient 's home medication list: No: no changes     Lab Results   Component Value Date    INR 3.5 02/10/2023    INR 2.6 12/30/2022    INR 3.7 11/11/2022           Anticoagulation Summary  As of 2/10/2023      INR goal:  2.5-3.5   TTR:  66.6 % (6.9 y)   INR used for dosing:  3.5 (2/10/2023)   Warfarin maintenance plan:  5 mg (2.5 mg x 2) every Mon, Wed, Fri; 2.5 mg (2.5 mg x 1) all other days; Starting 2/10/2023   Weekly warfarin total:  25 mg   Plan last modified:  Kristyn Anand [facility] (2/5/2021)   Next INR check:  3/24/2023   Priority:  Maintenance   Target end date: Indefinite    Indications    S/P AVR (aortic valve replacement) 2/16 [Z95.2]  S/P ascending aortic aneurysm repair [V68.455  Z86.79]                 Anticoagulation Episode Summary       INR check location:  Anticoagulation Clinic    Preferred lab:      Send INR reminders to:  [facility]AFF    Comments:  EPIC. .. ascending aortic aneurysm repair as well as St Donato mechanical AVR 2/2016. Anticoagulation Care Providers       Provider Role Specialty Phone number    Maricruz Ralph MD Referring Cardiology [phone]    Hayden Soto MD  Family Medicine [phone]            [facility] 02/09/2006     Warfarin assessment / plan:     Appears well. No changes affecting warfarin therapy were noted. No acute findings. INR within goal range. No change to warfarin dosing today. Patient is doing well today.  Denies any extra doses and is adherent to her regimen. No new medications or changes in previous medications. Discussed the patients diet; she believes she may have consumed less vitamin K containing foods in the past few weeks. Educated patient on how vitamin K can affect INR. INR > 3.5 (too thin)   INR < 2.5 (too thick). Patient's INR jumps high and then low but still falls within goal, patient understands the importance of consistency and keeping her diet and intake of greens the same each week. With an INR of 3.5, will continue the same regimen of Warfarin 2.5 mg (one tablet) daily EXCEPT 5 mg (two tablets) every Monday, Wednesday and Friday. Patient plans to eat extra servings of vitamin K containing foods like salad with spinach, broccoli, or cooked spinach this weekend. Follow up in 6 weeks      Description    CONTINUE: Warfarin 2.5 mg (one tablet) daily EXCEPT 5 mg (two tablets) every Monday, Wednesday and Friday    Plan to eat some greens this weekend. Enjoy a small serving of greens (usually broccoli) three times a week. Start adding spinach to your salads 2-3 times times per week or eat cooked spinach once a week    Call with medications changes, especially antibiotics and steroids and including any over-the-counter medications or herbal products. Call if you stop any medications.          Immunization History   Administered Date(s) Administered    COVID-19, [facility], Primary or Immunocompromised, (age 12y+), IM, 100 mcg/0.5mL 03/16/2021, 04/13/2021, 01/13/2022    INFLUENZA, INTRADERMAL, QUADRIVALENT, PRESERVATIVE FREE 10/04/2017    Influenza Virus Vaccine 09/03/2009, 10/01/2016, 10/09/2018    Influenza, FLUARIX, FLULAVAL, FLUZONE (age 10 mo+) AND AFLURIA, (age 1 y+), PF, 0.5mL 11/12/2020, 10/15/2021    Influenza, Intradermal, Preservative free 09/14/2012, 11/20/2015    Pneumococcal Polysaccharide ([id number]) 09/18/2015    Polio IPV (IPOL) 09/03/2009    Tdap (Boostrix, Adacel) 09/03/2009, 07/03/2015 Zoster Live (Zostavax) 09/07/2017    Zoster Recombinant (Shingrix) 08/31/2018, 04/12/2019           Orders Placed This Encounter   Procedures    POCT INR     This external order was created through the results console. No orders of the defined types were placed in this encounter. Reviewed AVS with patient / caregiver.     Billing Points:  Basic Education (disease state, med overview, expected symptoms) - 1 point      For Pharmacy Admin Tracking Only    Intervention Detail:   Total # of Interventions Recommended: 1  Total # of Interventions Accepted: 1  Time Spent (min): 20       Christal Davis, PharmD Candidate 2023

## 2023-03-24 ENCOUNTER — ANTI-COAG VISIT (OUTPATIENT)
Dept: PHARMACY | Age: 63
End: 2023-03-24
Payer: COMMERCIAL

## 2023-03-24 DIAGNOSIS — Z86.79 S/P ASCENDING AORTIC ANEURYSM REPAIR: ICD-10-CM

## 2023-03-24 DIAGNOSIS — Z95.2 S/P AVR (AORTIC VALVE REPLACEMENT): Primary | ICD-10-CM

## 2023-03-24 DIAGNOSIS — Z98.890 S/P ASCENDING AORTIC ANEURYSM REPAIR: ICD-10-CM

## 2023-03-24 LAB — INR BLD: 3.8

## 2023-03-24 PROCEDURE — 99212 OFFICE O/P EST SF 10 MIN: CPT

## 2023-03-24 PROCEDURE — 85610 PROTHROMBIN TIME: CPT

## 2023-03-24 NOTE — PROGRESS NOTES
QUADRIVALENT, PRESERVATIVE FREE 10/04/2017    Influenza Virus Vaccine 2009, 10/01/2016, 10/09/2018    Influenza, FLUARIX, FLULAVAL, FLUZONE (age 10 mo+) AND AFLURIA, (age 1 y+), PF, 0.5mL 2020, 10/15/2021    Influenza, Intradermal, Preservative free 2012, 2015    Pneumococcal, PPSV23, PNEUMOVAX 23, (age 2y+), SC/IM, 0.5mL 2015    Poliovirus, IPOL, (age 6w+), SC/IM, 0.5mL 2009    TDaP, ADACEL (age 10y-63y), Manuel Mering (age 10y+), IM, 0.5mL 2009, 2015    Zoster Live (Zostavax) 2017    Zoster Recombinant (Shingrix) 2018, 2019       Orders Placed This Encounter   Procedures    Protime-INR     This external order was created through the results console. No orders of the defined types were placed in this encounter. Reviewed AVS with patient / caregiver.     Billing Points:  Basic Education (disease state, med overview, expected symptoms) - 1 point   Adjust dosage and/or reconcile meds (fill pill box) </= 5 medications - 2 points     For Pharmacy Admin Tracking Only    Intervention Detail: Adherence Monitorin and Dose Adjustment: 1, reason: Therapy De-escalation  Total # of Interventions Recommended: 1  Total # of Interventions Accepted: 1  Time Spent (min): 20     Kenneth McginnisD Candidate 2023 3/24/2023 8:47 AM

## 2023-04-03 RX ORDER — WARFARIN SODIUM 2.5 MG/1
TABLET ORAL
Qty: 130 TABLET | Refills: 0 | Status: SHIPPED | OUTPATIENT
Start: 2023-04-03

## 2023-05-04 ENCOUNTER — TELEPHONE (OUTPATIENT)
Dept: FAMILY MEDICINE CLINIC | Age: 63
End: 2023-05-04

## 2023-05-04 ENCOUNTER — OFFICE VISIT (OUTPATIENT)
Dept: FAMILY MEDICINE CLINIC | Age: 63
End: 2023-05-04
Payer: COMMERCIAL

## 2023-05-04 VITALS
OXYGEN SATURATION: 98 % | HEIGHT: 64 IN | SYSTOLIC BLOOD PRESSURE: 126 MMHG | WEIGHT: 213 LBS | HEART RATE: 80 BPM | BODY MASS INDEX: 36.37 KG/M2 | DIASTOLIC BLOOD PRESSURE: 80 MMHG

## 2023-05-04 DIAGNOSIS — R31.29 HEMATURIA, MICROSCOPIC: Primary | ICD-10-CM

## 2023-05-04 DIAGNOSIS — R82.90 ABNORMAL URINE ODOR: ICD-10-CM

## 2023-05-04 LAB
BILIRUBIN, POC: ABNORMAL
BLOOD URINE, POC: ABNORMAL
CLARITY, POC: CLEAR
COLOR, POC: YELLOW
GLUCOSE URINE, POC: ABNORMAL
KETONES, POC: ABNORMAL
LEUKOCYTE EST, POC: ABNORMAL
NITRITE, POC: ABNORMAL
PH, POC: 6
PROTEIN, POC: ABNORMAL
SPECIFIC GRAVITY, POC: 1.02
UROBILINOGEN, POC: 0.2

## 2023-05-04 PROCEDURE — 3074F SYST BP LT 130 MM HG: CPT | Performed by: FAMILY MEDICINE

## 2023-05-04 PROCEDURE — 81002 URINALYSIS NONAUTO W/O SCOPE: CPT | Performed by: FAMILY MEDICINE

## 2023-05-04 PROCEDURE — 3079F DIAST BP 80-89 MM HG: CPT | Performed by: FAMILY MEDICINE

## 2023-05-04 PROCEDURE — 99213 OFFICE O/P EST LOW 20 MIN: CPT | Performed by: FAMILY MEDICINE

## 2023-05-04 RX ORDER — CLOBETASOL PROPIONATE 0.5 MG/G
CREAM TOPICAL
COMMUNITY
Start: 2023-03-10 | End: 2023-05-04

## 2023-05-04 SDOH — ECONOMIC STABILITY: FOOD INSECURITY: WITHIN THE PAST 12 MONTHS, THE FOOD YOU BOUGHT JUST DIDN'T LAST AND YOU DIDN'T HAVE MONEY TO GET MORE.: NEVER TRUE

## 2023-05-04 SDOH — ECONOMIC STABILITY: TRANSPORTATION INSECURITY
IN THE PAST 12 MONTHS, HAS LACK OF TRANSPORTATION KEPT YOU FROM MEETINGS, WORK, OR FROM GETTING THINGS NEEDED FOR DAILY LIVING?: NO

## 2023-05-04 SDOH — ECONOMIC STABILITY: FOOD INSECURITY: WITHIN THE PAST 12 MONTHS, YOU WORRIED THAT YOUR FOOD WOULD RUN OUT BEFORE YOU GOT MONEY TO BUY MORE.: NEVER TRUE

## 2023-05-04 SDOH — ECONOMIC STABILITY: INCOME INSECURITY: HOW HARD IS IT FOR YOU TO PAY FOR THE VERY BASICS LIKE FOOD, HOUSING, MEDICAL CARE, AND HEATING?: NOT HARD AT ALL

## 2023-05-04 SDOH — ECONOMIC STABILITY: HOUSING INSECURITY
IN THE LAST 12 MONTHS, WAS THERE A TIME WHEN YOU DID NOT HAVE A STEADY PLACE TO SLEEP OR SLEPT IN A SHELTER (INCLUDING NOW)?: NO

## 2023-05-04 ASSESSMENT — PATIENT HEALTH QUESTIONNAIRE - PHQ9
SUM OF ALL RESPONSES TO PHQ QUESTIONS 1-9: 0
SUM OF ALL RESPONSES TO PHQ9 QUESTIONS 1 & 2: 0
2. FEELING DOWN, DEPRESSED OR HOPELESS: 0
SUM OF ALL RESPONSES TO PHQ QUESTIONS 1-9: 0
1. LITTLE INTEREST OR PLEASURE IN DOING THINGS: 0

## 2023-05-04 NOTE — PROGRESS NOTES
URINARY SYMPTOMS  Subjective:     Chief Complaint   Patient presents with    Urinary Frequency     Has cloudy urine and has an odor. For about 1 month. Sal Mcconnell is a 57 y.o. female who complains of abnormal smelling urine, cloudy urine. She has had symptoms for 3 weeks. Patient denies fever, backpain, and diarrhea, no vaginal discharge. Patient does not have a history of recurrent UTI. CHART REVIEW  Health Maintenance   Topic Date Due    COVID-19 Vaccine (4 - Booster for Moderna series) 03/10/2022    Flu vaccine (Season Ended) 2023    Lipids  2023    Breast cancer screen  2023    Cervical cancer screen  2024    Depression Screen  2024    DTaP/Tdap/Td vaccine (3 - Td or Tdap) 2025    Colorectal Cancer Screen  10/21/2026    Shingles vaccine  Completed    Pneumococcal 0-64 years Vaccine  Completed    Hepatitis C screen  Completed    HIV screen  Completed    Hepatitis A vaccine  Aged Out    Hib vaccine  Aged Out    Meningococcal (ACWY) vaccine  Aged Out     Prior to Visit Medications    Medication Sig Taking? Authorizing Provider   warfarin (JANTOVEN) 2.5 MG tablet TAKE 1 TABLET DAILY EXCEPT 2 TABLETS EVERY MONDAY,    WEDNESDAY, AND FRIDAY Yes Nicolás Calderon MD   metoprolol succinate (TOPROL XL) 25 MG extended release tablet Take 1 tablet by mouth daily Yes Nicolás Calderon MD   atorvastatin (LIPITOR) 40 MG tablet TAKE 1 TABLET NIGHTLY Yes FELICITAS Dalton CNP   aspirin 81 MG chewable tablet Take 1 tablet by mouth nightly Yes Historical Provider, MD   clobetasol (TEMOVATE) 0.05 % cream   Historical Provider, MD      Social History     Tobacco Use    Smoking status: Former     Packs/day: 1.00     Years: 21.00     Pack years: 21.00     Types: Cigarettes     Quit date: 1999     Years since quittin.3    Smokeless tobacco: Never    Tobacco comments:     started age 23   Vaping Use    Vaping Use: Never used   Substance Use Topics    Alcohol use:  Yes

## 2023-05-04 NOTE — PATIENT INSTRUCTIONS
INSTRUCTIONS  NEXT APPOINTMENT: Please schedule annual complete physical (30 minutes) in 3 months fasting with Dr. Renetta Freeman or her NP, Wing Patel     Drink more water. Our office will call with urine culture results. Call If not better in  3 days.

## 2023-05-05 ENCOUNTER — ANTI-COAG VISIT (OUTPATIENT)
Dept: PHARMACY | Age: 63
End: 2023-05-05
Payer: COMMERCIAL

## 2023-05-05 DIAGNOSIS — Z95.2 S/P AVR (AORTIC VALVE REPLACEMENT): Primary | ICD-10-CM

## 2023-05-05 DIAGNOSIS — Z98.890 S/P ASCENDING AORTIC ANEURYSM REPAIR: ICD-10-CM

## 2023-05-05 DIAGNOSIS — Z86.79 S/P ASCENDING AORTIC ANEURYSM REPAIR: ICD-10-CM

## 2023-05-05 LAB — INTERNATIONAL NORMALIZATION RATIO, POC: 3.5

## 2023-05-05 PROCEDURE — 99211 OFF/OP EST MAY X REQ PHY/QHP: CPT

## 2023-05-05 PROCEDURE — 85610 PROTHROMBIN TIME: CPT

## 2023-05-05 NOTE — PROGRESS NOTES
Brie Marquez is a 57 y.o. here for warfarin management. Dona Mitchell had an INR test today. Results were reviewed and appropriate warfarin management was completed. This visit was performed as: An in person visit. Protocols were followed with precautions to reduce the spread of COVID-19. Patient verifies current warfarin dosing regimen: Yes     Warfarin medication reviewed and updated on the patient 's home medication list: Yes   All other medications reviewed and updated on the patient 's home medication list: No: no changes     Lab Results   Component Value Date    INR 3.5 05/05/2023    INR 3.80 03/24/2023    INR 3.5 02/10/2023     Patient Findings       Negatives:  Signs/symptoms of thrombosis, Signs/symptoms of bleeding, Change in medications, Change in diet/appetite, Bruising          Anticoagulation Summary  As of 5/5/2023      INR goal:  2.5-3.5   TTR:  64.5 % (7.2 y)   INR used for dosing:  3.5 (5/5/2023)   Warfarin maintenance plan:  5 mg (2.5 mg x 2) every Mon, Wed, Fri; 2.5 mg (2.5 mg x 1) all other days   Weekly warfarin total:  25 mg   Plan last modified:  Felix House, [facility] (2/5/2021)   Next INR check:  6/16/2023   Priority:  Maintenance   Target end date: Indefinite    Indications    S/P AVR (aortic valve replacement) 2/16 [Z95.2]  S/P ascending aortic aneurysm repair [T40.554  Z86.79]                 Anticoagulation Episode Summary       INR check location:  Anticoagulation Clinic    Preferred lab:      Send INR reminders to:  [facility]AFF    Comments:  EPIC. .. ascending aortic aneurysm repair as well as [place] Donato mechanical AVR 2/2016. Anticoagulation Care Providers       Provider Role Specialty Phone number    Billy Choi MD Referring Cardiology [phone]    Norberto Lofton MD  Family Medicine [phone]          [facility] 02/[facility]2006     Warfarin assessment / plan:     Patient appears well today. No changes affecting warfarin therapy were noted.

## 2023-05-06 LAB
BACTERIA UR CULT: ABNORMAL
ORGANISM: ABNORMAL

## 2023-05-08 DIAGNOSIS — N30.01 ACUTE CYSTITIS WITH HEMATURIA: Primary | ICD-10-CM

## 2023-05-08 RX ORDER — CIPROFLOXACIN 500 MG/1
500 TABLET, FILM COATED ORAL 2 TIMES DAILY
Qty: 14 TABLET | Refills: 0 | Status: SHIPPED | OUTPATIENT
Start: 2023-05-08 | End: 2023-05-15

## 2023-06-09 ENCOUNTER — PATIENT MESSAGE (OUTPATIENT)
Dept: CARDIOLOGY CLINIC | Age: 63
End: 2023-06-09

## 2023-06-09 RX ORDER — AMOXICILLIN 500 MG/1
CAPSULE ORAL
Qty: 4 CAPSULE | Refills: 1 | Status: SHIPPED | OUTPATIENT
Start: 2023-06-09

## 2023-06-09 NOTE — TELEPHONE ENCOUNTER
From: Wilner Hernandez  To: Dr. Brody Stockton: 6/9/2023 11:59 AM EDT  Subject: Amoxicillin Request    Would you please send a prescription for Amoxicillin to my pharmacy on file for my dentist appointment on 6/15? [facility] on the corner of [address] and [address] .  Thank you

## 2023-07-03 RX ORDER — WARFARIN SODIUM 2.5 MG/1
TABLET ORAL
Qty: 130 TABLET | Refills: 3 | Status: SHIPPED | OUTPATIENT
Start: 2023-07-03

## 2023-07-14 ENCOUNTER — ANTI-COAG VISIT (OUTPATIENT)
Dept: PHARMACY | Age: 63
End: 2023-07-14
Payer: COMMERCIAL

## 2023-07-14 DIAGNOSIS — Z98.890 S/P ASCENDING AORTIC ANEURYSM REPAIR: ICD-10-CM

## 2023-07-14 DIAGNOSIS — Z95.2 S/P AVR (AORTIC VALVE REPLACEMENT): Primary | ICD-10-CM

## 2023-07-14 DIAGNOSIS — Z86.79 S/P ASCENDING AORTIC ANEURYSM REPAIR: ICD-10-CM

## 2023-07-14 LAB — INR BLD: 4.3

## 2023-07-14 PROCEDURE — 99211 OFF/OP EST MAY X REQ PHY/QHP: CPT

## 2023-07-14 PROCEDURE — 85610 PROTHROMBIN TIME: CPT

## 2023-07-14 NOTE — PROGRESS NOTES
Anila Greene is a 70 y.o. here for warfarin management. Tor Plan had an INR test today. Results were reviewed and appropriate warfarin management was completed. Patient verifies current warfarin dosing regimen: Yes     Warfarin medication reviewed and updated on the patient 's home medication list: Yes   All other medications reviewed and updated on the patient 's home medication list: No: no changes      Lab Results   Component Value Date    INR 4.30 2023    INR 4.00 2023    INR 3.5 2023     Patient Findings       Positives:  Change in activity, Change in medications, Change in diet/appetite    Negatives:  Signs/symptoms of bleeding, Missed doses, Extra doses, Bruising    Comments:  Restarted zyrtec- no effct on INR   Decreased activity- works from home   Had raisin bread over the last 10 days           Anticoagulation Summary  As of 2023      INR goal:  2.5-3.5   TTR:  62.8 % (7.4 y)   INR used for dosin.30 (2023)   Warfarin maintenance plan:  5 mg (2.5 mg x 2) every Mon, Thu; 2.5 mg (2.5 mg x 1) all other days   Weekly warfarin total:  22.5 mg   Plan last modified:  Dennie Pummel (2023)   Next INR check:  2023   Priority:  Maintenance   Target end date: Indefinite    Indications    S/P AVR (aortic valve replacement)  [Z95.2]  S/P ascending aortic aneurysm repair [S19.468  Z86.79]                 Anticoagulation Episode Summary       INR check location:  Anticoagulation Clinic    Preferred lab:      Send INR reminders to:  [facility]    Comments:  EPIC. .. ascending aortic aneurysm repair as well as [place] mechanical AVR 2016. Anticoagulation Care Providers       Provider Role Specialty Phone number    Arianne Arthur MD Referring Cardiology [phone]    Zachery Menard MD  Family Medicine [phone]          [facility] 2006     Warfarin assessment / plan:     Appears well  Supra-therapeutic INR.      Denies signs

## 2023-07-18 RX ORDER — ATORVASTATIN CALCIUM 40 MG/1
TABLET, FILM COATED ORAL
Qty: 90 TABLET | Refills: 1 | Status: SHIPPED | OUTPATIENT
Start: 2023-07-18

## 2023-07-28 ENCOUNTER — ANTI-COAG VISIT (OUTPATIENT)
Dept: PHARMACY | Age: 63
End: 2023-07-28
Payer: COMMERCIAL

## 2023-07-28 DIAGNOSIS — Z98.890 S/P ASCENDING AORTIC ANEURYSM REPAIR: ICD-10-CM

## 2023-07-28 DIAGNOSIS — Z95.2 S/P AVR (AORTIC VALVE REPLACEMENT): Primary | ICD-10-CM

## 2023-07-28 DIAGNOSIS — Z86.79 S/P ASCENDING AORTIC ANEURYSM REPAIR: ICD-10-CM

## 2023-07-28 LAB — INR BLD: 3.3

## 2023-07-28 PROCEDURE — 85610 PROTHROMBIN TIME: CPT

## 2023-07-28 PROCEDURE — 99211 OFF/OP EST MAY X REQ PHY/QHP: CPT

## 2023-08-02 ENCOUNTER — OFFICE VISIT (OUTPATIENT)
Dept: FAMILY MEDICINE CLINIC | Age: 63
End: 2023-08-02
Payer: COMMERCIAL

## 2023-08-02 VITALS
RESPIRATION RATE: 18 BRPM | OXYGEN SATURATION: 94 % | BODY MASS INDEX: 35.65 KG/M2 | SYSTOLIC BLOOD PRESSURE: 114 MMHG | HEART RATE: 63 BPM | DIASTOLIC BLOOD PRESSURE: 68 MMHG | TEMPERATURE: 98.5 F | WEIGHT: 208.8 LBS | HEIGHT: 64 IN

## 2023-08-02 DIAGNOSIS — I69.30 HISTORY OF CVA WITH RESIDUAL DEFICIT: ICD-10-CM

## 2023-08-02 DIAGNOSIS — Z95.2 S/P AVR (AORTIC VALVE REPLACEMENT): ICD-10-CM

## 2023-08-02 DIAGNOSIS — E78.2 MIXED HYPERLIPIDEMIA: ICD-10-CM

## 2023-08-02 DIAGNOSIS — Z78.0 POST-MENOPAUSAL: ICD-10-CM

## 2023-08-02 DIAGNOSIS — Z79.01 CHRONIC ANTICOAGULATION: ICD-10-CM

## 2023-08-02 DIAGNOSIS — Z98.890 S/P ASCENDING AORTIC ANEURYSM REPAIR: ICD-10-CM

## 2023-08-02 DIAGNOSIS — I10 ESSENTIAL HYPERTENSION: ICD-10-CM

## 2023-08-02 DIAGNOSIS — Z00.00 WELL ADULT HEALTH CHECK: Primary | ICD-10-CM

## 2023-08-02 DIAGNOSIS — Z86.79 S/P ASCENDING AORTIC ANEURYSM REPAIR: ICD-10-CM

## 2023-08-02 PROBLEM — J43.1 PANLOBULAR EMPHYSEMA (HCC): Status: RESOLVED | Noted: 2021-11-01 | Resolved: 2023-08-02

## 2023-08-02 LAB
ALBUMIN SERPL-MCNC: 4.3 G/DL (ref 3.4–5)
ALBUMIN/GLOB SERPL: 1.8 {RATIO} (ref 1.1–2.2)
ALP SERPL-CCNC: 79 U/L (ref 40–129)
ALT SERPL-CCNC: 16 U/L (ref 10–40)
ANION GAP SERPL CALCULATED.3IONS-SCNC: 12 MMOL/L (ref 3–16)
AST SERPL-CCNC: 23 U/L (ref 15–37)
BASOPHILS # BLD: 0.1 K/UL (ref 0–0.2)
BASOPHILS NFR BLD: 0.9 %
BILIRUB SERPL-MCNC: 2.2 MG/DL (ref 0–1)
BUN SERPL-MCNC: 13 MG/DL (ref 7–20)
CALCIUM SERPL-MCNC: 9.3 MG/DL (ref 8.3–10.6)
CHLORIDE SERPL-SCNC: 105 MMOL/L (ref 99–110)
CHOLEST SERPL-MCNC: 150 MG/DL (ref 0–199)
CO2 SERPL-SCNC: 25 MMOL/L (ref 21–32)
CREAT SERPL-MCNC: 0.9 MG/DL (ref 0.6–1.2)
DEPRECATED RDW RBC AUTO: 13.9 % (ref 12.4–15.4)
EOSINOPHIL # BLD: 0.5 K/UL (ref 0–0.6)
EOSINOPHIL NFR BLD: 7.9 %
GFR SERPLBLD CREATININE-BSD FMLA CKD-EPI: >60 ML/MIN/{1.73_M2}
GLUCOSE SERPL-MCNC: 90 MG/DL (ref 70–99)
HCT VFR BLD AUTO: 44.2 % (ref 36–48)
HDLC SERPL-MCNC: 53 MG/DL (ref 40–60)
HGB BLD-MCNC: 15.2 G/DL (ref 12–16)
LDLC SERPL CALC-MCNC: 74 MG/DL
LYMPHOCYTES # BLD: 1.4 K/UL (ref 1–5.1)
LYMPHOCYTES NFR BLD: 22.8 %
MCH RBC QN AUTO: 30 PG (ref 26–34)
MCHC RBC AUTO-ENTMCNC: 34.5 G/DL (ref 31–36)
MCV RBC AUTO: 87.2 FL (ref 80–100)
MONOCYTES # BLD: 0.5 K/UL (ref 0–1.3)
MONOCYTES NFR BLD: 8.5 %
NEUTROPHILS # BLD: 3.7 K/UL (ref 1.7–7.7)
NEUTROPHILS NFR BLD: 59.9 %
PLATELET # BLD AUTO: 181 K/UL (ref 135–450)
PMV BLD AUTO: 10 FL (ref 5–10.5)
POTASSIUM SERPL-SCNC: 4.6 MMOL/L (ref 3.5–5.1)
PROT SERPL-MCNC: 6.7 G/DL (ref 6.4–8.2)
RBC # BLD AUTO: 5.06 M/UL (ref 4–5.2)
SODIUM SERPL-SCNC: 142 MMOL/L (ref 136–145)
TRIGL SERPL-MCNC: 114 MG/DL (ref 0–150)
VLDLC SERPL CALC-MCNC: 23 MG/DL
WBC # BLD AUTO: 6.1 K/UL (ref 4–11)

## 2023-08-02 PROCEDURE — 3074F SYST BP LT 130 MM HG: CPT | Performed by: FAMILY MEDICINE

## 2023-08-02 PROCEDURE — 99397 PER PM REEVAL EST PAT 65+ YR: CPT | Performed by: FAMILY MEDICINE

## 2023-08-02 PROCEDURE — 3078F DIAST BP <80 MM HG: CPT | Performed by: FAMILY MEDICINE

## 2023-08-02 NOTE — PROGRESS NOTES
PHYSICAL-VISIT NOTE   Assessment and Plan:      Diagnosis Orders   1. Well adult health check        2. Chronic anticoagulation for AVR- INR 2.5-3.5  CBC with Auto Differential      3. History of CVA with residual deficit- 2010, vision change  Amb External Referral To Neurology      4. Mixed hyperlipidemia  Comprehensive Metabolic Panel    Lipid Panel      5. S/P AVR (aortic valve replacement) 2/16        6. S/P ascending aortic aneurysm repair  Amb External Referral To Neurology      7. Post-menopausal  DEXA BONE DENSITY AXIAL SKELETON      8. Essential hypertension  Comprehensive Metabolic Panel      Stable. Plan as above and below. INSTRUCTIONS  NEXT APPOINTMENT: Please schedule check-up in 6 months with Dr. Pete Dewitt or her NP, Rashawn Cohen. PLEASE TAKE THIS FORM TO CHECK-OUT WINDOW TO SCHEDULE NEXT VISIT. PLEASE GET BLOODWORK DRAWN TODAY ON FIRST FLOOR in 170. Take orders with you. RESULTS- most blood tests back in couple days. We will call you if any problems. If bloodwork good, you will get letter in mail or notified thru [employer] (if signed up) within 2 weeks. If you do not, please call office. Please get flu vaccine when available in fall. Can get either at this office or at stores such as [employer] and [address] Would get new bivalent COVID booster in fall. Separate from other vaccines by at least 2 weeks. Please get annual dilated eye exam to screen for diabetic retinopathy which can lead to vision loss. Ask for report to be faxed to [phone]. Get back to work with [employer]  See neurology to see if any further evaluation needed for memory or vascular or genetics. Subjective:     Chief Complaint   Patient presents with    Annual Exam     No issues or concerns       Farhatyaima Villa is a 70 y.o. female who presents for annual testing/preventive review and check-up of medical problems listed below:  1. Well adult health check    2. Chronic anticoagulation for AVR- INR 2.5-3.5    3.

## 2023-08-02 NOTE — PATIENT INSTRUCTIONS
INSTRUCTIONS  NEXT APPOINTMENT: Please schedule check-up in 6 months with Dr. Radha Espinal or her NP, Navjot Bautista. PLEASE TAKE THIS FORM TO CHECK-OUT WINDOW TO SCHEDULE NEXT VISIT. PLEASE GET BLOODWORK DRAWN TODAY ON FIRST FLOOR in 170. Take orders with you. RESULTS- most blood tests back in couple days. We will call you if any problems. If bloodwork good, you will get letter in mail or notified thru [facility] (if signed up) within 2 weeks. If you do not, please call office. Please get flu vaccine when available in fall. Can get either at this office or at stores such as [employer] and [employer]. Would get new bivalent COVID booster in fall. Separate from other vaccines by at least 2 weeks. Please get annual dilated eye exam to screen for diabetic retinopathy which can lead to vision loss. Ask for report to be faxed to [phone]. Get back to work with [employer]  See neurology to see if any further evaluation needed for memory or vascular or genetics.

## 2023-09-05 ENCOUNTER — HOSPITAL ENCOUNTER (OUTPATIENT)
Dept: WOMENS IMAGING | Age: 63
Discharge: HOME | End: 2023-09-05
Attending: FAMILY MEDICINE
Payer: COMMERCIAL

## 2023-09-05 DIAGNOSIS — Z78.0 POST-MENOPAUSAL: ICD-10-CM

## 2023-09-05 PROCEDURE — 77080 DXA BONE DENSITY AXIAL: CPT

## 2023-09-08 ENCOUNTER — ANTI-COAG VISIT (OUTPATIENT)
Dept: PHARMACY | Age: 63
End: 2023-09-08
Payer: COMMERCIAL

## 2023-09-08 DIAGNOSIS — Z98.890 S/P ASCENDING AORTIC ANEURYSM REPAIR: ICD-10-CM

## 2023-09-08 DIAGNOSIS — Z95.2 S/P AVR (AORTIC VALVE REPLACEMENT): Primary | ICD-10-CM

## 2023-09-08 DIAGNOSIS — Z86.79 S/P ASCENDING AORTIC ANEURYSM REPAIR: ICD-10-CM

## 2023-09-08 LAB — INR BLD: 2.9

## 2023-09-08 PROCEDURE — 85610 PROTHROMBIN TIME: CPT | Performed by: SPEECH-LANGUAGE PATHOLOGIST

## 2023-09-08 PROCEDURE — 99211 OFF/OP EST MAY X REQ PHY/QHP: CPT | Performed by: SPEECH-LANGUAGE PATHOLOGIST

## 2023-09-08 NOTE — PROGRESS NOTES
readings; instructions on treatment plan and basic education; assessment of medication list with one med change and compliance) - 2 points     For Pharmacy Admin Tracking Only    Intervention Detail: Adherence Monitorin  Total # of Interventions Recommended: 1  Total # of Interventions Accepted: 1  Time Spent (min): 10

## 2023-10-19 ENCOUNTER — TELEPHONE (OUTPATIENT)
Dept: CARDIOLOGY CLINIC | Age: 63
End: 2023-10-19

## 2023-10-19 DIAGNOSIS — Z95.2 S/P AVR (AORTIC VALVE REPLACEMENT): Primary | ICD-10-CM

## 2023-10-20 ENCOUNTER — ANTI-COAG VISIT (OUTPATIENT)
Dept: PHARMACY | Age: 63
End: 2023-10-20
Payer: COMMERCIAL

## 2023-10-20 DIAGNOSIS — Z86.79 S/P ASCENDING AORTIC ANEURYSM REPAIR: ICD-10-CM

## 2023-10-20 DIAGNOSIS — Z95.2 S/P AVR (AORTIC VALVE REPLACEMENT): Primary | ICD-10-CM

## 2023-10-20 DIAGNOSIS — Z98.890 S/P ASCENDING AORTIC ANEURYSM REPAIR: ICD-10-CM

## 2023-10-20 LAB — INTERNATIONAL NORMALIZATION RATIO, POC: 2.5

## 2023-10-20 PROCEDURE — 99211 OFF/OP EST MAY X REQ PHY/QHP: CPT

## 2023-10-20 PROCEDURE — 85610 PROTHROMBIN TIME: CPT

## 2023-10-20 NOTE — PROGRESS NOTES
Edson De Anda is a 70 y.o. here for warfarin management. Martha Suggs had an INR test today. Results were reviewed and appropriate warfarin management was completed. Patient verifies current warfarin dosing regimen: Yes     Warfarin medication reviewed and updated on the patient 's home medication list: Yes   All other medications reviewed and updated on the patient 's home medication list: No: no changes     Lab Results   Component Value Date    INR 2.5 10/20/2023    INR 2.90 2023    INR 3.30 2023     Patient Findings       Negatives:  Signs/symptoms of thrombosis, Signs/symptoms of bleeding, Change in medications, Change in diet/appetite, Bruising          Anticoagulation Summary  As of 10/20/2023      INR goal:  2.5-3.5   TTR:  63.7 % (7.6 y)   INR used for dosin.5 (10/20/2023)   Warfarin maintenance plan:  5 mg (2.5 mg x 2) every Mon, Thu; 2.5 mg (2.5 mg x 1) all other days   Weekly warfarin total:  22.5 mg   Plan last modified:  Howard Claros (2023)   Next INR check:  2023   Priority:  Maintenance   Target end date: Indefinite    Indications    S/P AVR (aortic valve replacement)  [Z95.2]  S/P ascending aortic aneurysm repair [H94.095  Z86.79]                 Anticoagulation Episode Summary       INR check location:  Anticoagulation Clinic    Preferred lab:      Send INR reminders to:  [facility]    Comments:  EPIC. .. ascending aortic aneurysm repair as well as [place] mechanical AVR 2016. Anticoagulation Care Providers       Provider Role Specialty Phone number    Chao Donahue MD Referring Cardiology [phone]    Selina Jimenez MD  Family Medicine [phone]          [facility]     Warfarin assessment / plan:     Patient appears well today. No changes affecting warfarin therapy were noted. No acute findings with regards to warfarin therapy. INR today is within goal range.      Instructed to continue the same weekly

## 2023-12-01 ENCOUNTER — ANTI-COAG VISIT (OUTPATIENT)
Dept: PHARMACY | Age: 63
End: 2023-12-01
Payer: COMMERCIAL

## 2023-12-01 DIAGNOSIS — Z95.2 S/P AVR (AORTIC VALVE REPLACEMENT): Primary | ICD-10-CM

## 2023-12-01 DIAGNOSIS — Z98.890 S/P ASCENDING AORTIC ANEURYSM REPAIR: ICD-10-CM

## 2023-12-01 DIAGNOSIS — Z86.79 S/P ASCENDING AORTIC ANEURYSM REPAIR: ICD-10-CM

## 2023-12-01 LAB — INTERNATIONAL NORMALIZATION RATIO, POC: 3.6

## 2023-12-01 PROCEDURE — 99211 OFF/OP EST MAY X REQ PHY/QHP: CPT

## 2023-12-01 PROCEDURE — 85610 PROTHROMBIN TIME: CPT

## 2023-12-01 NOTE — PROGRESS NOTES
Decrease in vitamin K intake. She reports eating less of her foods with vitamin K due to eating holiday left overs. Likely the reason for her slightly elevated INR. Instructed to continue her same weekly dose and have some of the vitamin K foods this weekend. She agrees. Description    CONTINUE: Warfarin 2.5 mg (one tablet) daily EXCEPT 5 mg (two tablets) every Monday and Thursday    Enjoy a small serving of greens (usually broccoli) three times a week. Start adding spinach to your salads 2-3 times times per week or eat cooked spinach once a week    Started Cranberry juice 1 tablespoon per day as of 3/10/23. Call with medications changes, especially antibiotics and steroids and including any over-the-counter medications or herbal products. Call if you stop any medications. Immunization History   Administered Date(s) Administered    COVID-19, MODERNA BLUE border, Primary or Immunocompromised, (age 12y+), IM, 100 mcg/0.5mL 03/16/2021, 04/13/2021, 01/13/2022    COVID-19, PFIZER, (2023-24 formula), (age 12y+), IM, 30mcg/0.3mL 11/04/2023    INFLUENZA, INTRADERMAL, QUADRIVALENT, PRESERVATIVE FREE 10/04/2017    Influenza Virus Vaccine 09/03/2009, 10/01/2016, 10/09/2018    Influenza, FLUARIX, FLULAVAL, FLUZONE (age 10 mo+) AND AFLURIA, (age 1 y+), PF, 0.5mL 11/12/2020, 10/15/2021    Influenza, Intradermal, Preservative free 09/14/2012, 11/20/2015    Pneumococcal, PPSV23, PNEUMOVAX 23, (age 2y+), SC/IM, 0.5mL 09/18/2015    Poliovirus, IPOL, (age 6w+), SC/IM, 0.5mL 09/03/2009    TDaP, ADACEL (age 6y-58y), Radonna Seal (age 10y+), IM, 0.5mL 09/03/2009, 07/03/2015    Zoster Live (Zostavax) 09/07/2017    Zoster Recombinant (Shingrix) 08/31/2018, 04/12/2019       Orders Placed This Encounter   Procedures    POCT INR     This external order was created through the results console. No orders of the defined types were placed in this encounter. Reviewed AVS with patient / caregiver.     Billing

## 2024-01-09 RX ORDER — ATORVASTATIN CALCIUM 40 MG/1
TABLET, FILM COATED ORAL
Qty: 90 TABLET | Refills: 1 | Status: SHIPPED | OUTPATIENT
Start: 2024-01-09

## 2024-01-12 ENCOUNTER — ANTI-COAG VISIT (OUTPATIENT)
Dept: PHARMACY | Age: 64
End: 2024-01-12
Payer: COMMERCIAL

## 2024-01-12 DIAGNOSIS — Z95.2 S/P AVR (AORTIC VALVE REPLACEMENT): Primary | ICD-10-CM

## 2024-01-12 DIAGNOSIS — Z98.890 S/P ASCENDING AORTIC ANEURYSM REPAIR: ICD-10-CM

## 2024-01-12 DIAGNOSIS — Z86.79 S/P ASCENDING AORTIC ANEURYSM REPAIR: ICD-10-CM

## 2024-01-12 LAB — INTERNATIONAL NORMALIZATION RATIO, POC: 2.1

## 2024-01-12 PROCEDURE — 99211 OFF/OP EST MAY X REQ PHY/QHP: CPT

## 2024-01-12 PROCEDURE — 85610 PROTHROMBIN TIME: CPT

## 2024-01-12 NOTE — PROGRESS NOTES
Sydnee Hernandez is a 63 y.o. here for warfarin management.  Sydnee had an INR test today. Results were reviewed and appropriate warfarin management was completed.     Patient verifies current warfarin dosing regimen: Yes     Warfarin medication reviewed and updated on the patient 's home medication list: Yes   All other medications reviewed and updated on the patient 's home medication list: No: no changes     Lab Results   Component Value Date    INR 2.1 2024    INR 3.6 2023    INR 2.5 10/20/2023     Patient Findings       Negatives:  Signs/symptoms of bleeding, Change in health, Missed doses, Change in medications, Change in diet/appetite, Bruising          Anticoagulation Summary  As of 2024      INR goal:  2.5-3.5   TTR:  64.1 % (7.9 y)   INR used for dosin.1 (2024)   Warfarin maintenance plan:  5 mg (2.5 mg x 2) every Mon, Thu; 2.5 mg (2.5 mg x 1) all other days   Weekly warfarin total:  22.5 mg   Plan last modified:  Michelle Juarez (2023)   Next INR check:  2024   Priority:  Maintenance   Target end date:  Indefinite    Indications    S/P AVR (aortic valve replacement)  [Z95.2]  S/P ascending aortic aneurysm repair [Z98.890  Z86.79]                 Anticoagulation Episode Summary       INR check location:  Anticoagulation Clinic    Preferred lab:      Send INR reminders to:  [facility]    Comments:  EPIC... ascending aortic aneurysm repair as well as [place] mechanical AVR 2016.              Anticoagulation Care Providers       Provider Role Specialty Phone number    Alonso Magaña MD Referring Cardiology [phone]    Arley Martinez MD  Family Medicine [phone]          [facility] 2006     Warfarin assessment / plan:     Appears well  Sub-therapeutic INR     Denies missed doses.  Denies medication changes.  Denies increased activity.     Increased Vitamin K intake: She states that she may have had more broccoli than usual over the

## 2024-01-19 ENCOUNTER — TELEPHONE (OUTPATIENT)
Dept: PHARMACY | Age: 64
End: 2024-01-19

## 2024-01-19 ENCOUNTER — OFFICE VISIT (OUTPATIENT)
Dept: FAMILY MEDICINE CLINIC | Age: 64
End: 2024-01-19
Payer: COMMERCIAL

## 2024-01-19 VITALS
DIASTOLIC BLOOD PRESSURE: 64 MMHG | SYSTOLIC BLOOD PRESSURE: 116 MMHG | OXYGEN SATURATION: 96 % | WEIGHT: 208 LBS | HEIGHT: 64 IN | HEART RATE: 101 BPM | TEMPERATURE: 98.2 F | BODY MASS INDEX: 35.51 KG/M2

## 2024-01-19 DIAGNOSIS — J01.90 ACUTE BACTERIAL SINUSITIS: Primary | ICD-10-CM

## 2024-01-19 DIAGNOSIS — B96.89 ACUTE BACTERIAL SINUSITIS: Primary | ICD-10-CM

## 2024-01-19 PROCEDURE — 99213 OFFICE O/P EST LOW 20 MIN: CPT

## 2024-01-19 PROCEDURE — 1123F ACP DISCUSS/DSCN MKR DOCD: CPT

## 2024-01-19 PROCEDURE — 3078F DIAST BP <80 MM HG: CPT

## 2024-01-19 PROCEDURE — 3074F SYST BP LT 130 MM HG: CPT

## 2024-01-19 RX ORDER — AMOXICILLIN AND CLAVULANATE POTASSIUM 875; 125 MG/1; MG/1
1 TABLET, FILM COATED ORAL 2 TIMES DAILY
Qty: 20 TABLET | Refills: 0 | Status: SHIPPED | OUTPATIENT
Start: 2024-01-19 | End: 2024-01-29

## 2024-01-19 ASSESSMENT — ENCOUNTER SYMPTOMS
ABDOMINAL PAIN: 0
VOMITING: 0
COUGH: 1
VISUAL CHANGE: 0
SWOLLEN GLANDS: 1
NAUSEA: 0
SORE THROAT: 1
CHANGE IN BOWEL HABIT: 0

## 2024-01-19 ASSESSMENT — PATIENT HEALTH QUESTIONNAIRE - PHQ9
1. LITTLE INTEREST OR PLEASURE IN DOING THINGS: 0
2. FEELING DOWN, DEPRESSED OR HOPELESS: 0
SUM OF ALL RESPONSES TO PHQ QUESTIONS 1-9: 0
SUM OF ALL RESPONSES TO PHQ9 QUESTIONS 1 & 2: 0

## 2024-01-19 NOTE — PROGRESS NOTES
1/19/2024    This is a 63 y.o. female   Chief Complaint   Patient presents with    Pharyngitis     Raspy voice for 1 week, sore throat and left ear pain started yesterday. Both sides of neck hurts.   .    Pharyngitis  This is a new problem. The current episode started in the past 7 days. The problem occurs constantly. The problem has been gradually worsening. Associated symptoms include congestion, coughing, fatigue, a fever, headaches, a sore throat and swollen glands. Pertinent negatives include no abdominal pain, anorexia, arthralgias, change in bowel habit, chest pain, chills, diaphoresis, joint swelling, myalgias, nausea, neck pain, numbness, rash, urinary symptoms, vertigo, visual change, vomiting or weakness. The symptoms are aggravated by coughing. She has tried acetaminophen for the symptoms. The treatment provided mild relief.        Patient Active Problem List   Diagnosis    History of CVA with residual deficit- 2010, vision change    Postmenopausal status    Paralytic strabismus, third or oculomotor nerve palsy, total    Mild intermittent asthma without complication    Mixed hyperlipidemia    GERD (gastroesophageal reflux disease)    Allergic rhinitis    Nonrheumatic aortic valve stenosis    S/P AVR (aortic valve replacement) 2/16    Chronic anticoagulation for AVR- INR 2.5-3.5    Family history of colon cancer in mother    Arthritis of left foot    S/P ascending aortic aneurysm repair    Essential hypertension       Current Outpatient Medications   Medication Sig Dispense Refill    amoxicillin-clavulanate (AUGMENTIN) 875-125 MG per tablet Take 1 tablet by mouth 2 times daily for 10 days 20 tablet 0    atorvastatin (LIPITOR) 40 MG tablet TAKE 1 TABLET NIGHTLY 90 tablet 1    CALCIUM PO Take 2 tablets by mouth daily      warfarin (JANTOVEN) 2.5 MG tablet TAKE 1 TABLET DAILY EXCEPT 2 TABLETS EVERY MONDAY,    WEDNESDAY, AND FRIDAY (Patient taking differently: TAKE 1 TABLET DAILY EXCEPT 2 TABLETS EVERY Monday

## 2024-01-19 NOTE — TELEPHONE ENCOUNTER
Patient calling in to inform us she was prescribed Augmentin x 10 days    D/w her that this has potential to increase her INR    Seen last week and INR was low - suspect due to extra vitamin K intake    Given this, her higher goal of 2.5-3.5 for heart valve, will not adjust her dose at this time.     Will move her appt up to next Friday to monitor INR more closely and make any dose adjustments necessary.     Did d/w her that she could eat a similar number of servings as she had done the previous week and this may help keep her INR in range.

## 2024-01-26 ENCOUNTER — ANTI-COAG VISIT (OUTPATIENT)
Dept: PHARMACY | Age: 64
End: 2024-01-26
Payer: COMMERCIAL

## 2024-01-26 DIAGNOSIS — Z98.890 S/P ASCENDING AORTIC ANEURYSM REPAIR: ICD-10-CM

## 2024-01-26 DIAGNOSIS — Z95.2 S/P AVR (AORTIC VALVE REPLACEMENT): Primary | ICD-10-CM

## 2024-01-26 DIAGNOSIS — Z86.79 S/P ASCENDING AORTIC ANEURYSM REPAIR: ICD-10-CM

## 2024-01-26 LAB — INTERNATIONAL NORMALIZATION RATIO, POC: 3

## 2024-01-26 PROCEDURE — 99211 OFF/OP EST MAY X REQ PHY/QHP: CPT

## 2024-01-26 PROCEDURE — 85610 PROTHROMBIN TIME: CPT

## 2024-01-26 NOTE — PROGRESS NOTES
Sydnee Hernandez is a 63 y.o. here for warfarin management.  Sydnee had an INR test today. Results were reviewed and appropriate warfarin management was completed.     Patient verifies current warfarin dosing regimen: Yes     Warfarin medication reviewed and updated on the patient 's home medication list: Yes   All other medications reviewed and updated on the patient 's home medication list: Yes     Lab Results   Component Value Date    INR 3.0 01/26/2024    INR 2.1 01/12/2024    INR 3.6 12/01/2023     Patient Findings       Positives:  Change in medications    Negatives:  Signs/symptoms of thrombosis, Signs/symptoms of bleeding, Change in diet/appetite, Bruising    Comments:  Amoxicillin - low interaction          Anticoagulation Summary  As of 1/26/2024      INR goal:  2.5-3.5   TTR:  64.1 % (7.9 y)   INR used for dosing:  3.0 (1/26/2024)   Warfarin maintenance plan:  5 mg (2.5 mg x 2) every Mon, Thu; 2.5 mg (2.5 mg x 1) all other days   Weekly warfarin total:  22.5 mg   Plan last modified:  Michelle Juarez (7/14/2023)   Next INR check:  3/8/2024   Priority:  Maintenance   Target end date:  Indefinite    Indications    S/P AVR (aortic valve replacement) 2/16 [Z95.2]  S/P ascending aortic aneurysm repair [Z98.890  Z86.79]                 Anticoagulation Episode Summary       INR check location:  Anticoagulation Clinic    Preferred lab:      Send INR reminders to:  [facility]    Comments:  EPIC... ascending aortic aneurysm repair as well as [place] mechanical AVR 2/2016. Med mgt consent-9/2023, hosp recurring concent-1/26/24          Anticoagulation Care Providers       Provider Role Specialty Phone number    Alonso Magaña MD Referring Cardiology [phone]    Arley Martinez MD  Family Medicine [phone]          [facility] 02/09/2008     Warfarin assessment / plan:     Patient appears well today.      No acute findings with regards to warfarin therapy.  INR today is within goal

## 2024-01-26 NOTE — PROGRESS NOTES
[facility]      Cardiology Progress Note    Sydnee Hernandez  1960 January 29, 2024     Primary care physician: Arley Martinez MD  Reason for Visit: Aortic stenosis and aneurysm repair    CC: \"Everything is the same\"     HPI:  The patient is 63 y.o. 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 Originally, she was referred based on an abnormal echocardiogram. She was seen by her PCP for her annual check up and an echocardiogram was ordered to follow up on a heart murmur. The work-up showed a bicuspid aortic valve with severe stenosis and a severely dilated ascending aorta. She underwent a Bentall procedure on 2/16/16 at [facility] with Dr. Jonas. She had a syncopal episode of uncertain etiology in 4/2016 but denies recurrence. She is following with the coumadin clinic to manage her PT/INR. Today she presents for follow up and states that overall she is feeling well. She denies any new sounding cardiac complaints. She denies any chest pains or worsening shortness of breath. She reports medication compliance and is tolerating. She does question with her past medical history if her children should be \"tested for anything.\" She denies any abnormal bleeding or bruising. She denies exertional chest pain/pressure, dyspnea at rest, worsening NICHOLS, PND, orthopnea, palpitations, lightheadedness, weight changes, changes in LE edema, and syncope.    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

## 2024-01-29 ENCOUNTER — OFFICE VISIT (OUTPATIENT)
Dept: CARDIOLOGY CLINIC | Age: 64
End: 2024-01-29
Payer: COMMERCIAL

## 2024-01-29 VITALS
DIASTOLIC BLOOD PRESSURE: 62 MMHG | OXYGEN SATURATION: 96 % | HEIGHT: 64 IN | WEIGHT: 209.6 LBS | SYSTOLIC BLOOD PRESSURE: 110 MMHG | HEART RATE: 90 BPM | BODY MASS INDEX: 35.78 KG/M2

## 2024-01-29 DIAGNOSIS — Z95.2 S/P AVR (AORTIC VALVE REPLACEMENT): ICD-10-CM

## 2024-01-29 DIAGNOSIS — I71.21 ANEURYSM OF ASCENDING AORTA WITHOUT RUPTURE (HCC): ICD-10-CM

## 2024-01-29 DIAGNOSIS — Z86.73 HISTORY OF CVA (CEREBROVASCULAR ACCIDENT): ICD-10-CM

## 2024-01-29 DIAGNOSIS — I35.0 NONRHEUMATIC AORTIC VALVE STENOSIS: Primary | ICD-10-CM

## 2024-01-29 DIAGNOSIS — Z79.01 CHRONIC ANTICOAGULATION: ICD-10-CM

## 2024-01-29 PROCEDURE — 3074F SYST BP LT 130 MM HG: CPT | Performed by: INTERNAL MEDICINE

## 2024-01-29 PROCEDURE — 1123F ACP DISCUSS/DSCN MKR DOCD: CPT | Performed by: INTERNAL MEDICINE

## 2024-01-29 PROCEDURE — 3078F DIAST BP <80 MM HG: CPT | Performed by: INTERNAL MEDICINE

## 2024-01-29 PROCEDURE — 93000 ELECTROCARDIOGRAM COMPLETE: CPT | Performed by: INTERNAL MEDICINE

## 2024-01-29 PROCEDURE — 99214 OFFICE O/P EST MOD 30 MIN: CPT | Performed by: INTERNAL MEDICINE

## 2024-02-07 ENCOUNTER — OFFICE VISIT (OUTPATIENT)
Dept: FAMILY MEDICINE CLINIC | Age: 64
End: 2024-02-07
Payer: COMMERCIAL

## 2024-02-07 VITALS
DIASTOLIC BLOOD PRESSURE: 74 MMHG | HEART RATE: 67 BPM | HEIGHT: 64 IN | SYSTOLIC BLOOD PRESSURE: 112 MMHG | OXYGEN SATURATION: 97 % | WEIGHT: 210 LBS | BODY MASS INDEX: 35.85 KG/M2

## 2024-02-07 DIAGNOSIS — E78.2 MIXED HYPERLIPIDEMIA: ICD-10-CM

## 2024-02-07 DIAGNOSIS — J45.20 MILD INTERMITTENT ASTHMA WITHOUT COMPLICATION: ICD-10-CM

## 2024-02-07 DIAGNOSIS — Z23 NEED FOR PNEUMOCOCCAL VACCINATION: ICD-10-CM

## 2024-02-07 DIAGNOSIS — Z79.01 CHRONIC ANTICOAGULATION: ICD-10-CM

## 2024-02-07 DIAGNOSIS — I10 ESSENTIAL HYPERTENSION: Primary | ICD-10-CM

## 2024-02-07 PROCEDURE — 3078F DIAST BP <80 MM HG: CPT | Performed by: FAMILY MEDICINE

## 2024-02-07 PROCEDURE — 1123F ACP DISCUSS/DSCN MKR DOCD: CPT | Performed by: FAMILY MEDICINE

## 2024-02-07 PROCEDURE — 99213 OFFICE O/P EST LOW 20 MIN: CPT | Performed by: FAMILY MEDICINE

## 2024-02-07 PROCEDURE — 90677 PCV20 VACCINE IM: CPT | Performed by: FAMILY MEDICINE

## 2024-02-07 PROCEDURE — 90471 IMMUNIZATION ADMIN: CPT | Performed by: FAMILY MEDICINE

## 2024-02-07 PROCEDURE — 3074F SYST BP LT 130 MM HG: CPT | Performed by: FAMILY MEDICINE

## 2024-02-07 NOTE — PATIENT INSTRUCTIONS
cell phone alexis called \"Lose-it\"  Planning your meals ahead of time  Eating breakfast every day  Keeping your diet steady.  Eating the same on weekends,vacations and special occasions.  Watching less than 10 hours of television per week    Should I take weight loss medicines?   Taking medicines may work better than diet and exercise alone for some people. OTC orlistat (brand: Ali) can help weight loss. A multivitamin must be taken every day to prevent vitamin deficiencies. Many people regain weight after they stop taking these medicines.    Should I have weight loss surgery?   Surgery can help with long-term weight loss maintenance, BUT people who make major lifestyle changes can get the same results.

## 2024-02-07 NOTE — PROGRESS NOTES
03/07/2018     Objective:   PHYSICAL EXAM   /74 (Site: Left Upper Arm, Position: Sitting, Cuff Size: Large Adult)   Pulse 67   Ht 1.626 m (5' 4\")   Wt 95.3 kg (210 lb)   LMP 02/09/2006   SpO2 97%   BMI 36.05 kg/m²   BP Readings from Last 5 Encounters:   02/07/24 112/74   01/29/24 110/62   01/19/24 116/64   08/02/23 114/68   05/04/23 126/80     Wt Readings from Last 5 Encounters:   02/07/24 95.3 kg (210 lb)   01/29/24 95.1 kg (209 lb 9.6 oz)   01/19/24 94.3 kg (208 lb)   08/02/23 94.7 kg (208 lb 12.8 oz)   05/04/23 96.6 kg (213 lb)   Weight assessment: weight gain   GENERAL:   Weight: obese  well-developed, alert, no distress.        LUNGS:    Breathing unlabored  clear to auscultation bilaterally and good air movement  CARDIOVASC:   regular rate and rhythm  SKIN: warm and dry

## 2024-02-23 RX ORDER — METOPROLOL SUCCINATE 25 MG/1
25 TABLET, EXTENDED RELEASE ORAL DAILY
Qty: 90 TABLET | Refills: 3 | Status: SHIPPED | OUTPATIENT
Start: 2024-02-23

## 2024-02-23 NOTE — TELEPHONE ENCOUNTER
Requested Prescriptions     Pending Prescriptions Disp Refills    metoprolol succinate (TOPROL XL) 25 MG extended release tablet [Pharmacy Med Name: METOPROL SUC TAB 25MG ER] 90 tablet 3     Sig: TAKE 1 TABLET DAILY          Number: 90    Refills: 3    Last Office Visit: 1/29/2024     Next Office Visit: None scheduled    Last Refill: 03/13/2023    Last Labs:  08/02/2023

## 2024-03-08 ENCOUNTER — ANTI-COAG VISIT (OUTPATIENT)
Dept: PHARMACY | Age: 64
End: 2024-03-08
Payer: COMMERCIAL

## 2024-03-08 DIAGNOSIS — Z95.2 S/P AVR (AORTIC VALVE REPLACEMENT): Primary | ICD-10-CM

## 2024-03-08 DIAGNOSIS — Z98.890 S/P ASCENDING AORTIC ANEURYSM REPAIR: ICD-10-CM

## 2024-03-08 DIAGNOSIS — Z86.79 S/P ASCENDING AORTIC ANEURYSM REPAIR: ICD-10-CM

## 2024-03-08 LAB — INTERNATIONAL NORMALIZATION RATIO, POC: 2.9

## 2024-03-08 PROCEDURE — 85610 PROTHROMBIN TIME: CPT

## 2024-03-08 PROCEDURE — 99211 OFF/OP EST MAY X REQ PHY/QHP: CPT

## 2024-03-08 NOTE — PROGRESS NOTES
Sydnee Hernandez is a 63 y.o. here for warfarin management.  Sydnee had an INR test today. Results were reviewed and appropriate warfarin management was completed.     Patient verifies current warfarin dosing regimen: Yes     Warfarin medication reviewed and updated on the patient 's home medication list: Yes   All other medications reviewed and updated on the patient 's home medication list: Yes     Lab Results   Component Value Date    INR 2.9 2024    INR 3.0 2024    INR 2.1 2024     Patient Findings       Positives:  Change in diet/appetite    Negatives:  Signs/symptoms of bleeding, Change in health, Missed doses, Change in medications, Bruising    Comments:  Out of town last week-did not eat usual diet          Anticoagulation Summary  As of 3/8/2024      INR goal:  2.5-3.5   TTR:  64.6 % (8 y)   INR used for dosin.9 (3/8/2024)   Warfarin maintenance plan:  5 mg (2.5 mg x 2) every Mon, Thu; 2.5 mg (2.5 mg x 1) all other days   Weekly warfarin total:  22.5 mg   Plan last modified:  Michelle Juarez (2023)   Next INR check:  2024   Priority:  Maintenance   Target end date:  Indefinite    Indications    S/P AVR (aortic valve replacement)  [Z95.2]  S/P ascending aortic aneurysm repair [Z98.890  Z86.79]                 Anticoagulation Episode Summary       INR check location:  Anticoagulation Clinic    Preferred lab:      Send INR reminders to:  [facility]    Comments:  EPIC... ascending aortic aneurysm repair as well as [place] mechanical AVR 2016. Med mgt consent-2023, hosp recurring concent-24          Anticoagulation Care Providers       Provider Role Specialty Phone number    Alonso Magaña MD Referring Cardiology [phone]    Arley Martinez MD  Family Medicine [phone]          [facility] 2006     Warfarin assessment / plan:     Patient appears well today. She states that she was on vacation last week. Her diet was a bit off.     No

## 2024-04-19 ENCOUNTER — ANTI-COAG VISIT (OUTPATIENT)
Dept: PHARMACY | Age: 64
End: 2024-04-19
Payer: COMMERCIAL

## 2024-04-19 DIAGNOSIS — Z86.79 S/P ASCENDING AORTIC ANEURYSM REPAIR: ICD-10-CM

## 2024-04-19 DIAGNOSIS — Z98.890 S/P ASCENDING AORTIC ANEURYSM REPAIR: ICD-10-CM

## 2024-04-19 DIAGNOSIS — Z95.2 S/P AVR (AORTIC VALVE REPLACEMENT): Primary | ICD-10-CM

## 2024-04-19 LAB — INTERNATIONAL NORMALIZATION RATIO, POC: 2.3

## 2024-04-19 PROCEDURE — 85610 PROTHROMBIN TIME: CPT

## 2024-04-19 PROCEDURE — 99212 OFFICE O/P EST SF 10 MIN: CPT

## 2024-04-19 NOTE — PROGRESS NOTES
acute findings with regards to warfarin therapy.     Give one time dose of warfarin 5 mg today (typical dose 2.5 mg), then instructed to continue the same weekly warfarin dose.        Description    TODAY ONLY: warfarin 5mg (2 tablets)  CONTINUE: Warfarin 2.5 mg (one tablet) daily EXCEPT 5 mg (two tablets) every Monday and Thursday    Enjoy a small serving of greens (usually broccoli) three times a week.  Start adding spinach to your salads 2-3 times times per week or eat cooked spinach once a week    Started Cranberry juice 1 tablespoon per day as of 3/10/23.    Call with medications changes, especially antibiotics and steroids and including any over-the-counter medications or herbal products. Call if you stop any medications.       Immunization History   Administered Date(s) Administered    COVID-19, MODERNA BLUE border, Primary or Immunocompromised, (age 12y+), IM, 100 mcg/0.5mL 03/16/2021, 04/13/2021, 01/13/2022    COVID-19, PFIZER, (2023-24 formula), (age 12y+), IM, 30mcg/0.3mL 11/04/2023    INFLUENZA, INTRADERMAL, QUADRIVALENT, PRESERVATIVE FREE 10/04/2017    Influenza Virus Vaccine 09/03/2009, 10/01/2016, 10/09/2018    Influenza, FLUAD, (age 65 y+), Adjuvanted, 0.5mL 11/04/2023    Influenza, FLUARIX, FLULAVAL, FLUZONE (age 6 mo+) AND AFLURIA, (age 3 y+), PF, 0.5mL 11/12/2020, 10/15/2021    Influenza, Intradermal, Preservative free 09/14/2012, 11/20/2015    Pneumococcal, PCV20, PREVNAR 20, (age 6w+), IM, 0.5mL 02/07/2024    Pneumococcal, PPSV23, PNEUMOVAX 23, (age 2y+), SC/IM, 0.5mL 09/18/2015    Poliovirus, IPOL, (age 6w+), SC/IM, 0.5mL 09/03/2009    TDaP, ADACEL (age 10y-64y), BOOSTRIX (age 10y+), IM, 0.5mL 09/03/2009, 07/03/2015    Zoster Live (Zostavax) 09/07/2017    Zoster Recombinant (Shingrix) 08/31/2018, 04/12/2019       Orders Placed This Encounter   Procedures    POCT INR     This external order was created through the results console.      No orders of the defined types were placed in this

## 2024-05-29 ENCOUNTER — TELEPHONE (OUTPATIENT)
Dept: PHARMACY | Age: 64
End: 2024-05-29

## 2024-05-29 NOTE — TELEPHONE ENCOUNTER
Reports she started Macrobid 5/26.     Advised no interaction with warfarin no change to current warfarin regimen.  Will check INR 5/31 as planned.     Kristyn Anand, PharmD  [facility]  [facility]  Anticoagulation  [phone]    For Pharmacy Admin Tracking Only    Intervention Detail:   Total # of Interventions Recommended:   Total # of Interventions Accepted:   Time Spent (min): 5

## 2024-05-31 ENCOUNTER — ANTI-COAG VISIT (OUTPATIENT)
Dept: PHARMACY | Age: 64
End: 2024-05-31
Payer: COMMERCIAL

## 2024-05-31 DIAGNOSIS — Z95.2 S/P AVR (AORTIC VALVE REPLACEMENT): Primary | ICD-10-CM

## 2024-05-31 DIAGNOSIS — Z98.890 S/P ASCENDING AORTIC ANEURYSM REPAIR: ICD-10-CM

## 2024-05-31 DIAGNOSIS — Z86.79 S/P ASCENDING AORTIC ANEURYSM REPAIR: ICD-10-CM

## 2024-05-31 LAB — INTERNATIONAL NORMALIZATION RATIO, POC: 2.9

## 2024-05-31 PROCEDURE — 85610 PROTHROMBIN TIME: CPT

## 2024-05-31 PROCEDURE — 99211 OFF/OP EST MAY X REQ PHY/QHP: CPT

## 2024-05-31 NOTE — PROGRESS NOTES
Sydnee Hernandez is a 63 y.o. here for warfarin management.  Sydnee had an INR test today. Results were reviewed and appropriate warfarin management was completed.     Patient verifies current warfarin dosing regimen: Yes     Warfarin medication reviewed and updated on the patient 's home medication list: Yes   All other medications reviewed and updated on the patient 's home medication list: Yes     Lab Results   Component Value Date    INR 2.9 2024    INR 2.3 2024    INR 2.9 2024         Anticoagulation Summary  As of 2024      INR goal:  2.5-3.5   TTR:  64.7 % (8.2 y)   INR used for dosin.9 (2024)   Warfarin maintenance plan:  5 mg (2.5 mg x 2) every Mon, Thu; 2.5 mg (2.5 mg x 1) all other days   Weekly warfarin total:  22.5 mg   Plan last modified:  Michelle Juarez (2023)   Next INR check:  2024   Priority:  Maintenance   Target end date:  Indefinite    Indications    S/P AVR (aortic valve replacement)  [Z95.2]  S/P ascending aortic aneurysm repair [Z98.890  Z86.79]                 Anticoagulation Episode Summary       INR check location:  Anticoagulation Clinic    Preferred lab:      Send INR reminders to:  [facility]    Comments:  EPIC... ascending aortic aneurysm repair as well as [place] mechanical AVR 2016. Med mgt consent-2023, hosp recurring concent-24          Anticoagulation Care Providers       Provider Role Specialty Phone number    Alonso Magaña MD Referring Cardiology [phone]    Arley Martinez MD  Family Medicine [phone]          [facility] 2006     Warfarin assessment / plan:     Patient appears well today.      INR therapeutic today at 2.9  No changes to diet noted  Started taking Macrobid, but no significant interaction with warfarin    Continue same weekly dose  Description    CONTINUE: Warfarin 2.5 mg (one tablet) daily EXCEPT 5 mg (two tablets) every Monday and Thursday    Enjoy a small serving of

## 2024-06-10 ENCOUNTER — OFFICE VISIT (OUTPATIENT)
Dept: FAMILY MEDICINE CLINIC | Age: 64
End: 2024-06-10
Payer: COMMERCIAL

## 2024-06-10 VITALS
DIASTOLIC BLOOD PRESSURE: 73 MMHG | WEIGHT: 213.6 LBS | OXYGEN SATURATION: 98 % | HEART RATE: 81 BPM | HEIGHT: 64 IN | SYSTOLIC BLOOD PRESSURE: 119 MMHG | BODY MASS INDEX: 36.47 KG/M2

## 2024-06-10 DIAGNOSIS — R30.0 DYSURIA: ICD-10-CM

## 2024-06-10 DIAGNOSIS — R31.0 GROSS HEMATURIA: ICD-10-CM

## 2024-06-10 DIAGNOSIS — Z79.01 CHRONIC ANTICOAGULATION: ICD-10-CM

## 2024-06-10 DIAGNOSIS — R35.0 URINARY FREQUENCY: Primary | ICD-10-CM

## 2024-06-10 LAB
BILIRUBIN, POC: NORMAL
BLOOD URINE, POC: NORMAL
CLARITY, POC: NORMAL
COLOR, POC: NORMAL
GLUCOSE URINE, POC: NORMAL
KETONES, POC: NORMAL
LEUKOCYTE EST, POC: NORMAL
NITRITE, POC: NORMAL
PH, POC: 5.5
PROTEIN, POC: NORMAL
SPECIFIC GRAVITY, POC: >=1.03
UROBILINOGEN, POC: NORMAL

## 2024-06-10 PROCEDURE — 3078F DIAST BP <80 MM HG: CPT

## 2024-06-10 PROCEDURE — 1123F ACP DISCUSS/DSCN MKR DOCD: CPT

## 2024-06-10 PROCEDURE — 3074F SYST BP LT 130 MM HG: CPT

## 2024-06-10 PROCEDURE — 99213 OFFICE O/P EST LOW 20 MIN: CPT

## 2024-06-10 PROCEDURE — 81002 URINALYSIS NONAUTO W/O SCOPE: CPT

## 2024-06-10 RX ORDER — SULFAMETHOXAZOLE AND TRIMETHOPRIM 800; 160 MG/1; MG/1
1 TABLET ORAL 2 TIMES DAILY
Qty: 10 TABLET | Refills: 0 | Status: SHIPPED | OUTPATIENT
Start: 2024-06-10 | End: 2024-06-15

## 2024-06-10 SDOH — ECONOMIC STABILITY: FOOD INSECURITY: WITHIN THE PAST 12 MONTHS, YOU WORRIED THAT YOUR FOOD WOULD RUN OUT BEFORE YOU GOT MONEY TO BUY MORE.: NEVER TRUE

## 2024-06-10 SDOH — ECONOMIC STABILITY: FOOD INSECURITY: WITHIN THE PAST 12 MONTHS, THE FOOD YOU BOUGHT JUST DIDN'T LAST AND YOU DIDN'T HAVE MONEY TO GET MORE.: NEVER TRUE

## 2024-06-10 SDOH — ECONOMIC STABILITY: INCOME INSECURITY: HOW HARD IS IT FOR YOU TO PAY FOR THE VERY BASICS LIKE FOOD, HOUSING, MEDICAL CARE, AND HEATING?: NOT HARD AT ALL

## 2024-06-10 NOTE — PROGRESS NOTES
Sydnee Hernandez (: 1960) is a 63 y.o. female is here for evaluation of the following chief complaint(s): Urinary Frequency (Pt has been experiencing urinary frequency and burning while urinating x3 days. )    Assessment/Plan:   1. Urinary frequency  -     POCT Urinalysis no Micro  -     Culture, Urine  -     sulfamethoxazole-trimethoprim (BACTRIM DS) 800-160 MG per tablet; Take 1 tablet by mouth 2 times daily for 5 days, Disp-10 tablet, R-0Normal  2. Gross hematuria  -     sulfamethoxazole-trimethoprim (BACTRIM DS) 800-160 MG per tablet; Take 1 tablet by mouth 2 times daily for 5 days, Disp-10 tablet, R-0Normal  3. Dysuria  -     sulfamethoxazole-trimethoprim (BACTRIM DS) 800-160 MG per tablet; Take 1 tablet by mouth 2 times daily for 5 days, Disp-10 tablet, R-0Normal  4. Chronic anticoagulation for AVR- INR 2.5-3.5    POCT UA +leukocytes and blood  Follow C&S  Treat with bactrim DS BIDx5 days  She will contact the coumadin clinic for guidance on coumadin adjustment while on antibiotics  Will need repeat UA at next visit in August to ensure resolution of hematuria    The above diagnosis is a new problem.  We discussed expected course, resolution, and complications of diagnosis in detail.  I advised her to call back or return to office if symptoms worsen/change/persist.        Return if symptoms worsen or fail to improve.    Subjective/Objective:   She complains of dysuria, frequency, urgency, burning with urination, hematuria for 3 days.  She denies abnormal smelling urine, foul smelling urine, erythema of vaginal area, nausea, vomiting, diarrhea, constipation, hesitancy, inability to void.  Since starting she reports her symptoms are worsened. Treatments tried: none.    Was seen in [facility]- dx'd with UTI and treated with macrobid x5 days  Symptoms improved for about 1 week, then re-occurred on Saturday  She did see some blood in her urine on Saturday and developed dysuria/burning with urination    Pertinent

## 2024-06-11 ENCOUNTER — TELEPHONE (OUTPATIENT)
Dept: PHARMACY | Age: 64
End: 2024-06-11

## 2024-06-11 NOTE — TELEPHONE ENCOUNTER
Patient called, started Bactrim on 6/10 for 5 days. Anticipate an interaction with Warfarin.  She verifies she took warfarin 5mg dose 6/10. Advised patient to Hold warfarin 6/11/24 and take warfarin 2.5mg daily until you finish the Bactrim. Then resume your regular warfarin dose of 2.5mg daily EXCEPT 5mg every Monday and Thursday and follow up 7/12/24 as planned.    Kristyn Anand, PharmD    For Pharmacy Admin Tracking Only    Intervention Detail: Dose Adjustment: 1, reason: Interaction  Total # of Interventions Recommended: 1  Total # of Interventions Accepted: 1  Time Spent (min): 10

## 2024-06-12 LAB
BACTERIA UR CULT: ABNORMAL
ORGANISM: ABNORMAL

## 2024-06-17 RX ORDER — WARFARIN SODIUM 2.5 MG/1
TABLET ORAL
Qty: 130 TABLET | Refills: 3 | Status: SHIPPED | OUTPATIENT
Start: 2024-06-17

## 2024-06-25 ENCOUNTER — PATIENT MESSAGE (OUTPATIENT)
Dept: FAMILY MEDICINE CLINIC | Age: 64
End: 2024-06-25

## 2024-06-26 ENCOUNTER — OFFICE VISIT (OUTPATIENT)
Dept: FAMILY MEDICINE CLINIC | Age: 64
End: 2024-06-26
Payer: COMMERCIAL

## 2024-06-26 VITALS
BODY MASS INDEX: 36.37 KG/M2 | DIASTOLIC BLOOD PRESSURE: 72 MMHG | OXYGEN SATURATION: 97 % | SYSTOLIC BLOOD PRESSURE: 128 MMHG | WEIGHT: 213 LBS | HEART RATE: 72 BPM | HEIGHT: 64 IN

## 2024-06-26 DIAGNOSIS — I10 ESSENTIAL HYPERTENSION: ICD-10-CM

## 2024-06-26 DIAGNOSIS — E66.01 SEVERE OBESITY (BMI 35.0-39.9) WITH COMORBIDITY (HCC): Primary | ICD-10-CM

## 2024-06-26 PROCEDURE — 3078F DIAST BP <80 MM HG: CPT | Performed by: FAMILY MEDICINE

## 2024-06-26 PROCEDURE — 3074F SYST BP LT 130 MM HG: CPT | Performed by: FAMILY MEDICINE

## 2024-06-26 PROCEDURE — 1123F ACP DISCUSS/DSCN MKR DOCD: CPT | Performed by: FAMILY MEDICINE

## 2024-06-26 PROCEDURE — 99213 OFFICE O/P EST LOW 20 MIN: CPT | Performed by: FAMILY MEDICINE

## 2024-06-26 PROCEDURE — G2211 COMPLEX E/M VISIT ADD ON: HCPCS | Performed by: FAMILY MEDICINE

## 2024-06-26 NOTE — PATIENT INSTRUCTIONS
INSTRUCTIONS  NEXT APPOINTMENT: Please schedule check-up in 3 months    PLEASE TAKE THIS FORM TO CHECK-OUT WINDOW TO SCHEDULE NEXT VISIT.   Will see if insurance covers Zepbound. If not, will do compounded at [facility].  Plan to increase every month as tolerated. Let us know if want increase when calling/messaging for refill.  Walk 15 minutes of walking a day.

## 2024-06-26 NOTE — PROGRESS NOTES
CHRONIC CONDITION FOLLOW-UP     Assessment and Plan:      Diagnosis Orders   1. Severe obesity (BMI 35.0-39.9) with comorbidity (HCC)  Tirzepatide-Weight Management 2.5 MG/0.5ML SOAJ      2. Essential hypertension  Tirzepatide-Weight Management 2.5 MG/0.5ML SOAJ        Plan as above and below.     Continue current Tx plan. Any changes marked below.  MEDS  Current Outpatient Medications   Medication Instructions    amoxicillin (AMOXIL) 2,000 mg, Oral, SEE ADMIN INSTRUCTIONS, Take 4 capsules 30-60 minutes prior to each dental appointment.Patient has 2 separate dental appointment scheduled.    aspirin 81 mg, Oral, NIGHTLY    atorvastatin (LIPITOR) 40 MG tablet TAKE 1 TABLET NIGHTLY    CALCIUM PO 2 tablets, Oral, DAILY    metoprolol succinate (TOPROL XL) 25 mg, Oral, DAILY    Tirzepatide-Weight Management 2.5 mg, SubCUTAneous, EVERY 7 DAYS, OK TO COMPOUND    warfarin (JANTOVEN) 2.5 MG tablet TAKE 1 TABLET DAILY EXCEPT 2 TABLETS EVERY Monday and Thursday     INSTRUCTIONS  NEXT APPOINTMENT: Please schedule check-up in 3 months    PLEASE TAKE THIS FORM TO CHECK-OUT WINDOW TO SCHEDULE NEXT VISIT.   Will see if insurance covers Zepbound. If not, will do compounded at [facility].  Plan to increase every month as tolerated. Let us know if want increase when calling/messaging for refill.  Walk 15 minutes of walking a day.    Subjective:      Chief Complaint   Patient presents with    Discuss Medications     Wants to discuss med - Semaglutide..     Sydnee Hernandez is an 64 y.o. female who presents for follow up    Complaints:   Weight gain. Doing Weight Watchers for years.  Sits most of day working on computer    CHART REVIEW  Health Maintenance Due   Topic Date Due    Respiratory Syncytial Virus (RSV) Pregnant or age 60 yrs+ (1 - 1-dose 60+ series) Never done     Social History     Tobacco Use    Smoking status: Former     Current packs/day: 0.00     Average packs/day: 1 pack/day for 21.0 years (21.0 ttl pk-yrs)     Types:

## 2024-06-27 ENCOUNTER — TELEPHONE (OUTPATIENT)
Dept: ADMINISTRATIVE | Age: 64
End: 2024-06-27

## 2024-06-27 DIAGNOSIS — E66.01 SEVERE OBESITY (BMI 35.0-39.9) WITH COMORBIDITY (HCC): Primary | ICD-10-CM

## 2024-06-27 NOTE — TELEPHONE ENCOUNTER
Submitted PA for Zepbound 2.5MG/0.5ML pen-injectors   Via CMM Key: [id number] STATUS: PENDING.    Follow up done daily; if no decision with in three days we will refax.  If another three days goes by with no decision will call the insurance for status.

## 2024-06-28 DIAGNOSIS — E66.01 SEVERE OBESITY (BMI 35.0-39.9) WITH COMORBIDITY (HCC): ICD-10-CM

## 2024-06-28 NOTE — TELEPHONE ENCOUNTER
Called the patient and informed her that her insurance denied prior auth.  Patient would like to go forward with using compound pharmacy.  Call routed for rx

## 2024-07-05 RX ORDER — ATORVASTATIN CALCIUM 40 MG/1
TABLET, FILM COATED ORAL
Qty: 90 TABLET | Refills: 1 | Status: SHIPPED | OUTPATIENT
Start: 2024-07-05

## 2024-07-12 ENCOUNTER — ANTI-COAG VISIT (OUTPATIENT)
Dept: PHARMACY | Age: 64
End: 2024-07-12
Payer: COMMERCIAL

## 2024-07-12 DIAGNOSIS — Z86.79 S/P ASCENDING AORTIC ANEURYSM REPAIR: ICD-10-CM

## 2024-07-12 DIAGNOSIS — Z98.890 S/P ASCENDING AORTIC ANEURYSM REPAIR: ICD-10-CM

## 2024-07-12 DIAGNOSIS — Z95.2 S/P AVR (AORTIC VALVE REPLACEMENT): Primary | ICD-10-CM

## 2024-07-12 LAB
INTERNATIONAL NORMALIZATION RATIO, POC: 2
PROTHROMBIN TIME, POC: NORMAL

## 2024-07-12 PROCEDURE — 99211 OFF/OP EST MAY X REQ PHY/QHP: CPT

## 2024-07-12 PROCEDURE — 85610 PROTHROMBIN TIME: CPT

## 2024-07-19 DIAGNOSIS — E66.01 SEVERE OBESITY (BMI 35.0-39.9) WITH COMORBIDITY (HCC): ICD-10-CM

## 2024-07-19 NOTE — TELEPHONE ENCOUNTER
----- Message from Sydnee Hernandez sent at 7/19/2024  7:14 AM EDT -----  Regarding: Medication Refill  Contact: [phone]  Would you please call in my refill for Tirzepatide to [facility]. I’ve tolerated the first 3 injections well and would like to increase to the 5.0 dose. Thank you

## 2024-07-24 DIAGNOSIS — I10 ESSENTIAL HYPERTENSION: ICD-10-CM

## 2024-07-24 DIAGNOSIS — Z79.01 CHRONIC ANTICOAGULATION: ICD-10-CM

## 2024-07-24 DIAGNOSIS — E78.2 MIXED HYPERLIPIDEMIA: ICD-10-CM

## 2024-07-24 LAB
ALBUMIN SERPL-MCNC: 4.1 G/DL (ref 3.4–5)
ALBUMIN/GLOB SERPL: 1.7 {RATIO} (ref 1.1–2.2)
ALP SERPL-CCNC: 71 U/L (ref 40–129)
ALT SERPL-CCNC: 26 U/L (ref 10–40)
ANION GAP SERPL CALCULATED.3IONS-SCNC: 11 MMOL/L (ref 3–16)
AST SERPL-CCNC: 33 U/L (ref 15–37)
BASOPHILS # BLD: 0.1 K/UL (ref 0–0.2)
BASOPHILS NFR BLD: 1.3 %
BILIRUB SERPL-MCNC: 1.7 MG/DL (ref 0–1)
BUN SERPL-MCNC: 14 MG/DL (ref 7–20)
CALCIUM SERPL-MCNC: 9.2 MG/DL (ref 8.3–10.6)
CHLORIDE SERPL-SCNC: 106 MMOL/L (ref 99–110)
CHOLEST SERPL-MCNC: 113 MG/DL (ref 0–199)
CO2 SERPL-SCNC: 26 MMOL/L (ref 21–32)
CREAT SERPL-MCNC: 0.9 MG/DL (ref 0.6–1.2)
DEPRECATED RDW RBC AUTO: 14.1 % (ref 12.4–15.4)
EOSINOPHIL # BLD: 0.5 K/UL (ref 0–0.6)
EOSINOPHIL NFR BLD: 8.6 %
GFR SERPLBLD CREATININE-BSD FMLA CKD-EPI: 70 ML/MIN/{1.73_M2}
GLUCOSE SERPL-MCNC: 96 MG/DL (ref 70–99)
HCT VFR BLD AUTO: 43.2 % (ref 36–48)
HDLC SERPL-MCNC: 47 MG/DL (ref 40–60)
HGB BLD-MCNC: 14.6 G/DL (ref 12–16)
LDLC SERPL CALC-MCNC: 47 MG/DL
LYMPHOCYTES # BLD: 1.7 K/UL (ref 1–5.1)
LYMPHOCYTES NFR BLD: 30.7 %
MCH RBC QN AUTO: 29.6 PG (ref 26–34)
MCHC RBC AUTO-ENTMCNC: 33.8 G/DL (ref 31–36)
MCV RBC AUTO: 87.5 FL (ref 80–100)
MONOCYTES # BLD: 0.5 K/UL (ref 0–1.3)
MONOCYTES NFR BLD: 9 %
NEUTROPHILS # BLD: 2.9 K/UL (ref 1.7–7.7)
NEUTROPHILS NFR BLD: 50.4 %
PLATELET # BLD AUTO: 201 K/UL (ref 135–450)
PMV BLD AUTO: 10.5 FL (ref 5–10.5)
POTASSIUM SERPL-SCNC: 4.5 MMOL/L (ref 3.5–5.1)
PROT SERPL-MCNC: 6.5 G/DL (ref 6.4–8.2)
RBC # BLD AUTO: 4.93 M/UL (ref 4–5.2)
SODIUM SERPL-SCNC: 143 MMOL/L (ref 136–145)
TRIGL SERPL-MCNC: 95 MG/DL (ref 0–150)
VLDLC SERPL CALC-MCNC: 19 MG/DL
WBC # BLD AUTO: 5.7 K/UL (ref 4–11)

## 2024-08-08 ENCOUNTER — ANTI-COAG VISIT (OUTPATIENT)
Dept: PHARMACY | Age: 64
End: 2024-08-08
Payer: COMMERCIAL

## 2024-08-08 ENCOUNTER — OFFICE VISIT (OUTPATIENT)
Dept: FAMILY MEDICINE CLINIC | Age: 64
End: 2024-08-08
Payer: COMMERCIAL

## 2024-08-08 VITALS
BODY MASS INDEX: 34.18 KG/M2 | WEIGHT: 200.2 LBS | DIASTOLIC BLOOD PRESSURE: 60 MMHG | HEIGHT: 64 IN | RESPIRATION RATE: 16 BRPM | HEART RATE: 78 BPM | OXYGEN SATURATION: 96 % | SYSTOLIC BLOOD PRESSURE: 110 MMHG

## 2024-08-08 DIAGNOSIS — Z95.2 S/P AVR (AORTIC VALVE REPLACEMENT): Primary | ICD-10-CM

## 2024-08-08 DIAGNOSIS — Z86.79 S/P ASCENDING AORTIC ANEURYSM REPAIR: ICD-10-CM

## 2024-08-08 DIAGNOSIS — Z98.890 S/P ASCENDING AORTIC ANEURYSM REPAIR: ICD-10-CM

## 2024-08-08 DIAGNOSIS — I10 ESSENTIAL HYPERTENSION: ICD-10-CM

## 2024-08-08 DIAGNOSIS — Z95.2 S/P AVR (AORTIC VALVE REPLACEMENT): ICD-10-CM

## 2024-08-08 DIAGNOSIS — R17 TOTAL BILIRUBIN, ELEVATED: ICD-10-CM

## 2024-08-08 DIAGNOSIS — Z00.00 ENCOUNTER FOR WELL ADULT EXAM WITHOUT ABNORMAL FINDINGS: Primary | ICD-10-CM

## 2024-08-08 DIAGNOSIS — R31.0 GROSS HEMATURIA: ICD-10-CM

## 2024-08-08 DIAGNOSIS — I69.30 HISTORY OF CVA WITH RESIDUAL DEFICIT: ICD-10-CM

## 2024-08-08 DIAGNOSIS — Z79.01 CHRONIC ANTICOAGULATION: ICD-10-CM

## 2024-08-08 DIAGNOSIS — E78.2 MIXED HYPERLIPIDEMIA: ICD-10-CM

## 2024-08-08 DIAGNOSIS — E66.9 OBESITY (BMI 30.0-34.9): ICD-10-CM

## 2024-08-08 DIAGNOSIS — K21.9 GASTROESOPHAGEAL REFLUX DISEASE WITHOUT ESOPHAGITIS: ICD-10-CM

## 2024-08-08 PROBLEM — M19.072 ARTHRITIS OF LEFT FOOT: Status: RESOLVED | Noted: 2018-06-08 | Resolved: 2024-08-08

## 2024-08-08 PROBLEM — E66.811 OBESITY (BMI 30.0-34.9): Status: ACTIVE | Noted: 2024-06-26

## 2024-08-08 LAB
BILIRUBIN, POC: NORMAL
BLOOD URINE, POC: NORMAL
CLARITY, POC: CLEAR
COLOR, POC: YELLOW
GLUCOSE URINE, POC: NORMAL
INTERNATIONAL NORMALIZATION RATIO, POC: 3.9
KETONES, POC: NORMAL
LEUKOCYTE EST, POC: NORMAL
NITRITE, POC: NORMAL
PH, POC: 5.5
PROTEIN, POC: NORMAL
PROTHROMBIN TIME, POC: NORMAL
SPECIFIC GRAVITY, POC: 1.03
UROBILINOGEN, POC: 0.2

## 2024-08-08 PROCEDURE — 99211 OFF/OP EST MAY X REQ PHY/QHP: CPT | Performed by: INTERNAL MEDICINE

## 2024-08-08 PROCEDURE — 81002 URINALYSIS NONAUTO W/O SCOPE: CPT | Performed by: FAMILY MEDICINE

## 2024-08-08 PROCEDURE — 85610 PROTHROMBIN TIME: CPT | Performed by: INTERNAL MEDICINE

## 2024-08-08 RX ORDER — OMEPRAZOLE 40 MG/1
40 CAPSULE, DELAYED RELEASE ORAL
Qty: 90 CAPSULE | Refills: 3 | Status: SHIPPED | OUTPATIENT
Start: 2024-08-08

## 2024-08-08 NOTE — PATIENT INSTRUCTIONS
may have suggested ways to take good care of yourself. Your doctor also may have recommended tests. You can help prevent illness with healthy eating, good sleep, vaccinations, regular exercise, and other steps.    Get the tests that you and your doctor decide on. Depending on your age and risks, examples might include hearing tests as well as screening for colon, breast, and lung cancer. Screening helps find diseases before any symptoms appear.   Eat healthy foods. Choose fruits, vegetables, whole grains, lean protein, and low-fat dairy foods. Limit saturated fat, and reduce salt.     Limit alcohol. Men should have no more than 2 drinks a day. Women should have no more than 1. For some people, no alcohol is the best choice.   Exercise. It can help prevent falls. Get at least 30 minutes of exercise on most days of the week. Walking, yoga, and mary chi can be good choices.     Reach and stay at your healthy weight. This will lower your risk for many health problems.   Take care of your mental health. Try to stay connected with friends, family, and community, and find ways to manage stress.     If you're feeling depressed or hopeless, talk to someone. A counselor can help. If you don't have a counselor, talk to your doctor.   Talk to your doctor if you think you may have a problem with alcohol or drug use. This includes prescription medicines and illegal drugs.     Avoid tobacco and nicotine: Don't smoke, vape, or chew. If you need help quitting, talk to your doctor.   Practice safer sex. Getting tested, using condoms or dental dams, and limiting sex partners can help prevent STIs.     Make an advance directive. This is a legal way to tell your family and doctor what you want to happen at the end of your life or when you can't speak for yourself.   Prevent problems where you can. Protect your skin from too much sun, wash your hands, brush your teeth twice a day, and wear a seat belt in the car.   Where can you learn

## 2024-08-08 NOTE — PROGRESS NOTES
07/24/2024    VLDL 19 07/24/2024     Lab Results   Component Value Date     07/24/2024    K 4.5 07/24/2024    CREATININE 0.9 07/24/2024     Lab Results   Component Value Date    WBC 5.7 07/24/2024    HGB 14.6 07/24/2024     07/24/2024     Lab Results   Component Value Date    ALT 26 07/24/2024    AST 33 07/24/2024    ALKPHOS 71 07/24/2024    BILITOT 1.7 (H) 07/24/2024     TSH (uIU/mL)   Date Value   09/07/2017 2.50     Lab Results   Component Value Date    GLUCOSE 96 07/24/2024     Lab Results   Component Value Date    LABA1C 5.3 03/13/2019    LABA1C 5.6 08/31/2018    LABA1C 5.4 03/07/2018     Objective:   PHYSICAL EXAM   /60   Pulse 78   Resp 16   Ht 1.626 m (5' 4\")   Wt 90.8 kg (200 lb 3.2 oz)   LMP 02/09/2006   SpO2 96%   BMI 34.36 kg/m²   BP Readings from Last 5 Encounters:   08/08/24 110/60   06/26/24 128/72   06/10/24 119/73   02/07/24 112/74   01/29/24 110/62     Wt Readings from Last 5 Encounters:   08/08/24 90.8 kg (200 lb 3.2 oz)   06/26/24 96.6 kg (213 lb)   06/10/24 96.9 kg (213 lb 9.6 oz)   02/07/24 95.3 kg (210 lb)   01/29/24 95.1 kg (209 lb 9.6 oz)   weight loss   GENERAL:   obese  well-developed, alert, no distress.     EYES:   External findings: lids and lashes normal and conjunctivae and sclerae normal  Eyes: no periorbital cellulitis.  ENT:   External nose and ears appear normal  normal TM's and external ear canals both ears  Pharynx: normal. Exudates: None  Lips, mucosa, and tongue normal  Hearing grossly normal.     NECK:   Supple, symmetrical, trachea midline  Thyroid not enlarged, symmetric, no tenderness/mass/nodules  LYMPH:  no cervical nodes, no supraclavicular nodes  LUNGS:    Breathing unlabored  clear to auscultation bilaterally and good air movement  CARDIOVASC:   regular rate and rhythm, S1, S2 normal. No murmur, click, rub or gallop  Apical impulse normal  LEGS:  Lower extremity edema: none    No carotid bruits  ABDOMEN:   Soft, non-tender, no masses  No

## 2024-08-08 NOTE — PROGRESS NOTES
Points:  Adjust dosage and/or reconcile meds (fill pill box) </= 5 medications - 2 points  BASIC ASSESSMENT UNCOMPLICATED (including but not limited to: vital signs; physical assessment screening; review of secondary markers like lab results, allergies, home readings; instructions on treatment plan and basic education; assessment of medication list with one med change and compliance) - 2 points     For Pharmacy Admin Tracking Only    Intervention Detail: Adherence Monitorin and Dose Adjustment: 1, reason: Therapy Optimization  Total # of Interventions Recommended: 2  Total # of Interventions Accepted: 2  Time Spent (min): 20

## 2024-08-17 ENCOUNTER — PATIENT MESSAGE (OUTPATIENT)
Dept: FAMILY MEDICINE CLINIC | Age: 64
End: 2024-08-17

## 2024-08-17 DIAGNOSIS — E66.01 SEVERE OBESITY (BMI 35.0-39.9) WITH COMORBIDITY (HCC): ICD-10-CM

## 2024-09-06 ENCOUNTER — OFFICE VISIT (OUTPATIENT)
Dept: FAMILY MEDICINE CLINIC | Age: 64
End: 2024-09-06
Payer: COMMERCIAL

## 2024-09-06 ENCOUNTER — TELEPHONE (OUTPATIENT)
Dept: PHARMACY | Age: 64
End: 2024-09-06

## 2024-09-06 VITALS
BODY MASS INDEX: 32.78 KG/M2 | SYSTOLIC BLOOD PRESSURE: 114 MMHG | HEIGHT: 64 IN | OXYGEN SATURATION: 98 % | RESPIRATION RATE: 14 BRPM | HEART RATE: 60 BPM | TEMPERATURE: 97.9 F | DIASTOLIC BLOOD PRESSURE: 68 MMHG | WEIGHT: 192 LBS

## 2024-09-06 DIAGNOSIS — R35.0 URINARY FREQUENCY: Primary | ICD-10-CM

## 2024-09-06 DIAGNOSIS — N30.01 ACUTE CYSTITIS WITH HEMATURIA: ICD-10-CM

## 2024-09-06 LAB
BILIRUBIN, POC: NORMAL
BLOOD URINE, POC: NORMAL
CLARITY, POC: NORMAL
COLOR, POC: NORMAL
GLUCOSE URINE, POC: NORMAL MG/DL
KETONES, POC: NORMAL MG/DL
LEUKOCYTE EST, POC: NORMAL
NITRITE, POC: NORMAL
PH, POC: 6
PROTEIN, POC: NORMAL MG/DL
SPECIFIC GRAVITY, POC: 1.01
UROBILINOGEN, POC: NORMAL MG/DL

## 2024-09-06 PROCEDURE — 3074F SYST BP LT 130 MM HG: CPT

## 2024-09-06 PROCEDURE — 81002 URINALYSIS NONAUTO W/O SCOPE: CPT

## 2024-09-06 PROCEDURE — 3078F DIAST BP <80 MM HG: CPT

## 2024-09-06 PROCEDURE — 99213 OFFICE O/P EST LOW 20 MIN: CPT

## 2024-09-06 PROCEDURE — 1123F ACP DISCUSS/DSCN MKR DOCD: CPT

## 2024-09-06 RX ORDER — CIPROFLOXACIN 500 MG/1
500 TABLET, FILM COATED ORAL 2 TIMES DAILY
Qty: 20 TABLET | Refills: 0 | Status: SHIPPED | OUTPATIENT
Start: 2024-09-06 | End: 2024-09-16

## 2024-09-06 NOTE — TELEPHONE ENCOUNTER
----- Message from Jameson RIOS sent at 9/6/2024  2:04 PM EDT -----  Pt starting new antibiotic that was called in today, [facility] sent automated message saying it was amoxicillin. Can you call pt to let her know if this will affect INR? Ciprofloxacin is also on med list.   [phone]

## 2024-09-06 NOTE — PROGRESS NOTES
Sydnee Hernandez (: 1960) is a 64 y.o. female is here for evaluation of the following chief complaint(s): Urinary Pain (Pt is having burning while urinating x6 days. )    Assessment/Plan:     Assessment & Plan  Urinary frequency   Acute condition, new, POCT UA concerning for UTI with +moderate blood and small leukocytes.  Will send for C&S and empirically treat with cipro BID x10 days.  She will contact coumadin clinic for adjustments due to antibiotic interactions.    Orders:    POCT Urinalysis no Micro    Culture, Urine    Acute cystitis with hematuria   Acute condition, new, Acute condition, new, POCT UA concerning for UTI with +moderate blood and small leukocytes.  Will send for C&S and empirically treat with cipro BID x10 days.  She will contact coumadin clinic for adjustments due to antibiotic interactions.    Orders:    ciprofloxacin (CIPRO) 500 MG tablet; Take 1 tablet by mouth 2 times daily for 10 days    The above diagnosis is a new problem.  We discussed expected course, resolution, and complications of diagnosis in detail.  I advised her to call back or return to office if symptoms worsen/change/persist.        Return if symptoms worsen or fail to improve.    Subjective/Objective:   She complains of dysuria, urgency, foul smelling urine, burning with urination for 6 days.  She denies frequency, nausea, vomiting, diarrhea, constipation, hematuria.  Since starting she reports her symptoms are waxing and waning. Treatments tried: none.    Pertinent items are noted in HPI.      Physical Exam:  General: alert, cooperative, and no distress  Abdomen: soft, nontender, nondistended, no masses or organomegaly  Back: CVA tenderness absent  : exam deferred  /68 (Site: Right Upper Arm, Position: Sitting, Cuff Size: Large Adult)   Pulse 60   Temp 97.9 °F (36.6 °C) (Oral)   Resp 14   Ht 1.626 m (5' 4\")   Wt 87.1 kg (192 lb)   LMP 2006   SpO2 98%   BMI 32.96 kg/m²        An electronic

## 2024-09-06 NOTE — TELEPHONE ENCOUNTER
New antibiotic for Cipro for 10 days.  She reports she also got a call from [facility] that amoxicillin was ready.     Instructed to take warfarin 2.5mg daily EXCEPT do not take warfarin Monday 9/9/24. F/u with us 9/16 as planned.     If the prescription is amoxicillin and not cipro then take your regular warfarin dose of 2.5mg daily EXCEPT 5mg every Tuesday and Thursday.     Indication for warfarin is AVR and ascending aortic repair.     Lab Results   Component Value Date    INR 3.9 08/08/2024    INR 2.0 07/12/2024    INR 2.9 05/31/2024     She agrees to call with any signs or symptoms of bleeding including nosebleeds or unusual bruising.     Kristyn Anand, PharmD  [facility]  [facility]  Anticoagulation  [phone]    For Pharmacy Admin Tracking Only    Intervention Detail: Dose Adjustment: 1, reason: Interaction  Total # of Interventions Recommended: 1  Total # of Interventions Accepted: 1  Time Spent (min): 10

## 2024-09-08 LAB
BACTERIA UR CULT: ABNORMAL
BACTERIA UR CULT: ABNORMAL
ORGANISM: ABNORMAL

## 2024-09-09 LAB
BACTERIA UR CULT: ABNORMAL
BACTERIA UR CULT: ABNORMAL
ORGANISM: ABNORMAL

## 2024-09-16 ENCOUNTER — ANTI-COAG VISIT (OUTPATIENT)
Dept: PHARMACY | Age: 64
End: 2024-09-16
Payer: COMMERCIAL

## 2024-09-16 ENCOUNTER — PATIENT MESSAGE (OUTPATIENT)
Dept: FAMILY MEDICINE CLINIC | Age: 64
End: 2024-09-16

## 2024-09-16 DIAGNOSIS — Z86.79 S/P ASCENDING AORTIC ANEURYSM REPAIR: ICD-10-CM

## 2024-09-16 DIAGNOSIS — E66.9 OBESITY (BMI 30.0-34.9): Primary | ICD-10-CM

## 2024-09-16 DIAGNOSIS — Z95.2 S/P AVR (AORTIC VALVE REPLACEMENT): Primary | ICD-10-CM

## 2024-09-16 DIAGNOSIS — Z98.890 S/P ASCENDING AORTIC ANEURYSM REPAIR: ICD-10-CM

## 2024-09-16 LAB
INTERNATIONAL NORMALIZATION RATIO, POC: 1.9
PROTHROMBIN TIME, POC: 0

## 2024-09-16 PROCEDURE — 99211 OFF/OP EST MAY X REQ PHY/QHP: CPT

## 2024-09-16 PROCEDURE — 85610 PROTHROMBIN TIME: CPT

## 2024-10-02 ENCOUNTER — ANTI-COAG VISIT (OUTPATIENT)
Dept: PHARMACY | Age: 64
End: 2024-10-02
Payer: COMMERCIAL

## 2024-10-02 DIAGNOSIS — Z98.890 S/P ASCENDING AORTIC ANEURYSM REPAIR: ICD-10-CM

## 2024-10-02 DIAGNOSIS — Z95.2 S/P AVR (AORTIC VALVE REPLACEMENT): Primary | ICD-10-CM

## 2024-10-02 DIAGNOSIS — Z86.79 S/P ASCENDING AORTIC ANEURYSM REPAIR: ICD-10-CM

## 2024-10-02 LAB
INTERNATIONAL NORMALIZATION RATIO, POC: 5.7
PROTHROMBIN TIME, POC: 0

## 2024-10-02 PROCEDURE — 99211 OFF/OP EST MAY X REQ PHY/QHP: CPT

## 2024-10-02 PROCEDURE — 85610 PROTHROMBIN TIME: CPT

## 2024-10-02 NOTE — PROGRESS NOTES
Sydnee Hernandez is a 64 y.o. here for warfarin management.  Sydnee had an INR test today. Results were reviewed and appropriate warfarin management was completed.     Patient verifies current warfarin dosing regimen: Yes     Warfarin medication reviewed and updated on the patient 's home medication list: Yes   All other medications reviewed and updated on the patient 's home medication list: Yes     Lab Results   Component Value Date    INR 5.7 10/02/2024    INR 1.9 2024    INR 3.9 2024       Anticoagulation Summary  As of 10/2/2024      INR goal:  2.5-3.5   TTR:  63.9% (8.6 y)   INR used for dosin.7 (10/2/2024)   Warfarin maintenance plan:  5 mg (2.5 mg x 2) every Tue, Thu; 2.5 mg (2.5 mg x 1) all other days   Weekly warfarin total:  22.5 mg   Plan last modified:  Isabela Walter RPH (2024)   Next INR check:  10/18/2024   Priority:  Maintenance   Target end date:  Indefinite    Indications    S/P AVR (aortic valve replacement)  [Z95.2]  S/P ascending aortic aneurysm repair [Z98.890  Z86.79]                 Anticoagulation Episode Summary       INR check location:  Anticoagulation Clinic    Preferred lab:  --    Send INR reminders to:  [facility]    Comments:  EPIC... ascending aortic aneurysm repair as well as [place] mechanical AVR 2016. Med mgt consent-2023, hosp recurring concent-24          Anticoagulation Care Providers       Provider Role Specialty Phone number    Alonso Magaña MD Referring Cardiology [phone]    Arley Martinez MD  Family Medicine [phone]          [facility] 2008     Warfarin assessment / plan:     Appears well  Supra-therapeutic INR.     Denies signs and symptoms of bleeding/bruising.  Denies medication changes.  Denies extra warfarin doses.  Denies change in appetite.  Denies decrease in vitamin K intake.     Patient has been eating less with being on weight loss injection but she continues to eat her greens. She

## 2024-10-11 DIAGNOSIS — Z95.2 S/P AVR (AORTIC VALVE REPLACEMENT): Primary | ICD-10-CM

## 2024-10-13 ENCOUNTER — PATIENT MESSAGE (OUTPATIENT)
Dept: FAMILY MEDICINE CLINIC | Age: 64
End: 2024-10-13

## 2024-10-18 ENCOUNTER — ANTI-COAG VISIT (OUTPATIENT)
Dept: PHARMACY | Age: 64
End: 2024-10-18
Payer: COMMERCIAL

## 2024-10-18 DIAGNOSIS — Z86.79 S/P ASCENDING AORTIC ANEURYSM REPAIR: ICD-10-CM

## 2024-10-18 DIAGNOSIS — Z95.2 S/P AVR (AORTIC VALVE REPLACEMENT): Primary | ICD-10-CM

## 2024-10-18 DIAGNOSIS — Z98.890 S/P ASCENDING AORTIC ANEURYSM REPAIR: ICD-10-CM

## 2024-10-18 LAB
INTERNATIONAL NORMALIZATION RATIO, POC: 3.6
PROTHROMBIN TIME, POC: 0

## 2024-10-18 PROCEDURE — 85610 PROTHROMBIN TIME: CPT

## 2024-10-18 PROCEDURE — 99211 OFF/OP EST MAY X REQ PHY/QHP: CPT

## 2024-10-18 NOTE — PROGRESS NOTES
Sydnee Hernandez is a 64 y.o. here for warfarin management.  Sydnee had an INR test today. Results were reviewed and appropriate warfarin management was completed.     Patient verifies current warfarin dosing regimen: Yes     Warfarin medication reviewed and updated on the patient 's home medication list: Yes   All other medications reviewed and updated on the patient 's home medication list: No: no changes     Lab Results   Component Value Date    INR 3.6 10/18/2024    INR 5.7 10/02/2024    INR 1.9 09/16/2024     Anticoagulation Summary  As of 10/18/2024      INR goal:  2.5-3.5   TTR:  63.6% (8.6 y)   INR used for dosing:  3.6 (10/18/2024)   Warfarin maintenance plan:  5 mg (2.5 mg x 2) every Tue, Thu; 2.5 mg (2.5 mg x 1) all other days   Weekly warfarin total:  22.5 mg   Plan last modified:  Isabela Walter RPH (8/8/2024)   Next INR check:  11/15/2024   Priority:  Maintenance   Target end date:  Indefinite    Indications    S/P AVR (aortic valve replacement) 2/16 [Z95.2]  S/P ascending aortic aneurysm repair [Z98.890  Z86.79]                 Anticoagulation Episode Summary       INR check location:  Anticoagulation Clinic    Preferred lab:  --    Send INR reminders to:  [facility]    Comments:  EPIC... ascending aortic aneurysm repair as well as [place] mechanical AVR 2/2016. Med mgt consent-9/2023, hosp recurring concent-1/26/24          Anticoagulation Care Providers       Provider Role Specialty Phone number    Alonso Magaña MD Referring Cardiology [phone]    Arley Martinez MD  Family Medicine [phone]          [facility] 02/09/2008     Warfarin assessment / plan:     Appears well  Supra-therapeutic INR.     Denies signs and symptoms of bleeding/bruising.  Denies medication changes.  Denies decrease in vitamin K intake.     INR just slightly above goal range at 3.6.  Last INR was elevated however, the INR before that was low on the same weekly dose.  Seems to be diet changes

## 2024-10-29 RX ORDER — AMOXICILLIN 500 MG/1
2000 CAPSULE ORAL SEE ADMIN INSTRUCTIONS
Qty: 8 CAPSULE | Refills: 0 | Status: SHIPPED | OUTPATIENT
Start: 2024-10-29

## 2024-10-29 NOTE — TELEPHONE ENCOUNTER
Sydnee Diego called in and has a dentist appointment today and she needs amoxil called into the phar today for her appointment   She states forgot to call and her appointment is today at 9 am   Is it ok to send this over

## 2024-11-10 DIAGNOSIS — E66.811 OBESITY (BMI 30.0-34.9): Primary | ICD-10-CM

## 2024-11-15 ENCOUNTER — ANTI-COAG VISIT (OUTPATIENT)
Dept: PHARMACY | Age: 64
End: 2024-11-15
Payer: COMMERCIAL

## 2024-11-15 DIAGNOSIS — Z95.2 S/P AVR (AORTIC VALVE REPLACEMENT): Primary | ICD-10-CM

## 2024-11-15 DIAGNOSIS — Z86.79 S/P ASCENDING AORTIC ANEURYSM REPAIR: ICD-10-CM

## 2024-11-15 DIAGNOSIS — Z98.890 S/P ASCENDING AORTIC ANEURYSM REPAIR: ICD-10-CM

## 2024-11-15 LAB
INTERNATIONAL NORMALIZATION RATIO, POC: 4.4
PROTHROMBIN TIME, POC: 0

## 2024-11-15 PROCEDURE — 99212 OFFICE O/P EST SF 10 MIN: CPT

## 2024-11-15 PROCEDURE — 85610 PROTHROMBIN TIME: CPT

## 2024-11-15 NOTE — PROGRESS NOTES
Intradermal, Preservative free 2012, 2015    Pneumococcal, PCV20, PREVNAR 20, (age 6w+), IM, 0.5mL 2024    Pneumococcal, PPSV23, PNEUMOVAX 23, (age 2y+), SC/IM, 0.5mL 2015    Poliovirus, IPOL, (age 6w+), SC/IM, 0.5mL 2009    TDaP, ADACEL (age 10y-64y), BOOSTRIX (age 10y+), IM, 0.5mL 2009, 2015    Zoster Live (Zostavax) 2017    Zoster Recombinant (Shingrix) 2018, 2019       Orders Placed This Encounter   Procedures    POCT INR     This external order was created through the results console.      No orders of the defined types were placed in this encounter.     Reviewed AVS with patient / caregiver.    Billing Points:  Adjust dosage and/or reconcile meds (fill pill box) </= 5 medications - 2 points  BASIC ASSESSMENT UNCOMPLICATED (including but not limited to: vital signs; physical assessment screening; review of secondary markers like lab results, allergies, home readings; instructions on treatment plan and basic education; assessment of medication list with one med change and compliance) - 2 points     For Pharmacy Admin Tracking Only    Intervention Detail: Adherence Monitorin and Dose Adjustment: 1, reason: Therapy De-escalation  Total # of Interventions Recommended: 1  Total # of Interventions Accepted: 1  Time Spent (min): 15

## 2024-11-29 ENCOUNTER — ANTI-COAG VISIT (OUTPATIENT)
Dept: PHARMACY | Age: 64
End: 2024-11-29
Payer: COMMERCIAL

## 2024-11-29 DIAGNOSIS — Z86.79 S/P ASCENDING AORTIC ANEURYSM REPAIR: ICD-10-CM

## 2024-11-29 DIAGNOSIS — Z98.890 S/P ASCENDING AORTIC ANEURYSM REPAIR: ICD-10-CM

## 2024-11-29 DIAGNOSIS — Z95.2 S/P AVR (AORTIC VALVE REPLACEMENT): Primary | ICD-10-CM

## 2024-11-29 LAB
INTERNATIONAL NORMALIZATION RATIO, POC: 2.7
PROTHROMBIN TIME, POC: 0

## 2024-11-29 PROCEDURE — 85610 PROTHROMBIN TIME: CPT

## 2024-11-29 PROCEDURE — 99211 OFF/OP EST MAY X REQ PHY/QHP: CPT

## 2024-11-29 NOTE — PROGRESS NOTES
Sydnee Hernandez is a 64 y.o. here for warfarin management.  Sydnee had an INR test today. Results were reviewed and appropriate warfarin management was completed.     Patient verifies current warfarin dosing regimen: Yes     Warfarin medication reviewed and updated on the patient 's home medication list: Yes   All other medications reviewed and updated on the patient 's home medication list: No: no changes     Lab Results   Component Value Date    INR 2.7 2024    INR 4.4 11/15/2024    INR 3.6 10/18/2024     Patient Findings       Negatives:  Signs/symptoms of thrombosis, Signs/symptoms of bleeding, Change in medications, Change in diet/appetite, Bruising          Anticoagulation Summary  As of 2024      INR goal:  2.5-3.5   TTR:  62.9% (8.7 y)   INR used for dosin.7 (2024)   Warfarin maintenance plan:  5 mg (2.5 mg x 2) every Tue; 2.5 mg (2.5 mg x 1) all other days   Weekly warfarin total:  20 mg   Plan last modified:  Kristyn Anand RP (11/15/2024)   Next INR check:  1/10/2025   Priority:  Maintenance   Target end date:  Indefinite    Indications    S/P AVR (aortic valve replacement)  [Z95.2]  S/P ascending aortic aneurysm repair [Z98.890  Z86.79]                 Anticoagulation Episode Summary       INR check location:  Anticoagulation Clinic    Preferred lab:  --    Send INR reminders to:  [facility]    Comments:  EPIC... ascending aortic aneurysm repair as well as [place] mechanical AVR 2016. Med mgt consent-2023, hosp recurring concent-24          Anticoagulation Care Providers       Provider Role Specialty Phone number    Alonso Magaña MD Referring Cardiology [phone]    Arley Martinez MD  Family Medicine [phone]          [facility] 2008     Warfarin assessment / plan:     Patient appears well today.      No changes affecting warfarin therapy were noted.   No acute findings with regards to warfarin therapy.  INR today is within goal

## 2024-12-03 NOTE — PROGRESS NOTES
[facility]      Cardiology Progress Note    Sydnee Hernandez  1960 December 11, 2024     Primary care physician: Arley Martinez MD  Reason for Visit: Aortic stenosis and aneurysm repair    CC: \"Nothing new\"     HPI:  The patient is 64 y.o. 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 Originally, she was referred based on an abnormal echocardiogram. She was seen by her PCP for her annual check up and an echocardiogram was ordered to follow up on a heart murmur. The work-up showed a bicuspid aortic valve with severe stenosis and a severely dilated ascending aorta. She underwent a Bentall procedure on 2/16/16 at [facility] with Dr. Jonas. She had a syncopal episode of uncertain etiology in 4/2016 but denies recurrence. She is following with the coumadin clinic to manage her PT/INR. Today she presents for follow up and states that overall she is feeling well. She denies any new sounding cardiac complaints. She denies any chest pains or worsening shortness of breath. She reports medication compliance and is tolerating. She denies any abnormal bleeding or bruising. She denies exertional chest pain/pressure, dyspnea at rest, worsening NICHOLS, PND, orthopnea, palpitations, lightheadedness, weight changes, changes in LE edema, and syncope.    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

## 2024-12-09 ENCOUNTER — TELEPHONE (OUTPATIENT)
Dept: FAMILY MEDICINE CLINIC | Age: 64
End: 2024-12-09

## 2024-12-09 DIAGNOSIS — E66.811 OBESITY (BMI 30.0-34.9): ICD-10-CM

## 2024-12-09 RX ORDER — SEMAGLUTIDE 2.4 MG/.75ML
2.4 INJECTION, SOLUTION SUBCUTANEOUS
Qty: 3 ML | Refills: 2 | Status: SHIPPED | OUTPATIENT
Start: 2024-12-09 | End: 2025-01-07 | Stop reason: SDUPTHER

## 2024-12-09 NOTE — TELEPHONE ENCOUNTER
The patient has been notified of this information and all questions answered.  She said ok. thanks

## 2024-12-11 ENCOUNTER — OFFICE VISIT (OUTPATIENT)
Dept: CARDIOLOGY CLINIC | Age: 64
End: 2024-12-11
Payer: COMMERCIAL

## 2024-12-11 VITALS
OXYGEN SATURATION: 99 % | BODY MASS INDEX: 30.39 KG/M2 | HEART RATE: 64 BPM | HEIGHT: 64 IN | DIASTOLIC BLOOD PRESSURE: 62 MMHG | SYSTOLIC BLOOD PRESSURE: 112 MMHG | WEIGHT: 178 LBS | TEMPERATURE: 96.8 F

## 2024-12-11 DIAGNOSIS — I71.21 ANEURYSM OF ASCENDING AORTA WITHOUT RUPTURE (HCC): ICD-10-CM

## 2024-12-11 DIAGNOSIS — Z86.73 HISTORY OF CVA (CEREBROVASCULAR ACCIDENT): ICD-10-CM

## 2024-12-11 DIAGNOSIS — I35.0 NONRHEUMATIC AORTIC VALVE STENOSIS: Primary | ICD-10-CM

## 2024-12-11 DIAGNOSIS — Z95.2 S/P AVR (AORTIC VALVE REPLACEMENT): ICD-10-CM

## 2024-12-11 PROCEDURE — 3074F SYST BP LT 130 MM HG: CPT | Performed by: INTERNAL MEDICINE

## 2024-12-11 PROCEDURE — 3078F DIAST BP <80 MM HG: CPT | Performed by: INTERNAL MEDICINE

## 2024-12-11 PROCEDURE — 99214 OFFICE O/P EST MOD 30 MIN: CPT | Performed by: INTERNAL MEDICINE

## 2024-12-11 PROCEDURE — 1123F ACP DISCUSS/DSCN MKR DOCD: CPT | Performed by: INTERNAL MEDICINE

## 2024-12-27 RX ORDER — ATORVASTATIN CALCIUM 40 MG/1
TABLET, FILM COATED ORAL
Qty: 90 TABLET | Refills: 1 | Status: SHIPPED | OUTPATIENT
Start: 2024-12-27

## 2025-01-07 DIAGNOSIS — E66.811 OBESITY (BMI 30.0-34.9): ICD-10-CM

## 2025-01-07 RX ORDER — SEMAGLUTIDE 2.4 MG/.75ML
2.4 INJECTION, SOLUTION SUBCUTANEOUS
Qty: 3 ML | Refills: 2 | Status: SHIPPED | OUTPATIENT
Start: 2025-01-07

## 2025-01-17 ENCOUNTER — ANTI-COAG VISIT (OUTPATIENT)
Dept: PHARMACY | Age: 65
End: 2025-01-17
Payer: COMMERCIAL

## 2025-01-17 DIAGNOSIS — Z95.2 S/P AVR (AORTIC VALVE REPLACEMENT): Primary | ICD-10-CM

## 2025-01-17 DIAGNOSIS — Z98.890 S/P ASCENDING AORTIC ANEURYSM REPAIR: ICD-10-CM

## 2025-01-17 DIAGNOSIS — Z86.79 S/P ASCENDING AORTIC ANEURYSM REPAIR: ICD-10-CM

## 2025-01-17 LAB
INTERNATIONAL NORMALIZATION RATIO, POC: 2.4
PROTHROMBIN TIME, POC: 0

## 2025-01-17 PROCEDURE — 99212 OFFICE O/P EST SF 10 MIN: CPT

## 2025-01-17 PROCEDURE — 85610 PROTHROMBIN TIME: CPT

## 2025-01-17 NOTE — PROGRESS NOTES
2015    Zoster Live (Zostavax) 2017    Zoster Recombinant (Shingrix) 2018, 2019     Orders Placed This Encounter   Procedures    POCT INR     This external order was created through the results console.      No orders of the defined types were placed in this encounter.     Reviewed AVS with patient / caregiver.    Billing Points:  Adjust dosage and/or reconcile meds (fill pill box) </= 5 medications - 2 points  BASIC ASSESSMENT UNCOMPLICATED (including but not limited to: vital signs; physical assessment screening; review of secondary markers like lab results, allergies, home readings; instructions on treatment plan and basic education; assessment of medication list with one med change and compliance) - 2 points     For Pharmacy Admin Tracking Only    Intervention Detail: Adherence Monitorin and Dose Adjustment: 1, reason: Therapy Optimization  Total # of Interventions Recommended: 1  Total # of Interventions Accepted: 1  Time Spent (min): 15

## 2025-02-03 DIAGNOSIS — E66.811 OBESITY (BMI 30.0-34.9): ICD-10-CM

## 2025-02-03 RX ORDER — SEMAGLUTIDE 2.4 MG/.75ML
2.4 INJECTION, SOLUTION SUBCUTANEOUS
Qty: 3 ML | Refills: 2 | Status: SHIPPED | OUTPATIENT
Start: 2025-02-03

## 2025-02-09 SDOH — ECONOMIC STABILITY: FOOD INSECURITY: WITHIN THE PAST 12 MONTHS, THE FOOD YOU BOUGHT JUST DIDN'T LAST AND YOU DIDN'T HAVE MONEY TO GET MORE.: NEVER TRUE

## 2025-02-09 SDOH — ECONOMIC STABILITY: FOOD INSECURITY: WITHIN THE PAST 12 MONTHS, YOU WORRIED THAT YOUR FOOD WOULD RUN OUT BEFORE YOU GOT MONEY TO BUY MORE.: NEVER TRUE

## 2025-02-09 SDOH — ECONOMIC STABILITY: TRANSPORTATION INSECURITY
IN THE PAST 12 MONTHS, HAS THE LACK OF TRANSPORTATION KEPT YOU FROM MEDICAL APPOINTMENTS OR FROM GETTING MEDICATIONS?: NO

## 2025-02-09 SDOH — ECONOMIC STABILITY: INCOME INSECURITY: IN THE LAST 12 MONTHS, WAS THERE A TIME WHEN YOU WERE NOT ABLE TO PAY THE MORTGAGE OR RENT ON TIME?: NO

## 2025-02-09 ASSESSMENT — PATIENT HEALTH QUESTIONNAIRE - PHQ9
1. LITTLE INTEREST OR PLEASURE IN DOING THINGS: NOT AT ALL
2. FEELING DOWN, DEPRESSED OR HOPELESS: NOT AT ALL
SUM OF ALL RESPONSES TO PHQ9 QUESTIONS 1 & 2: 0
SUM OF ALL RESPONSES TO PHQ QUESTIONS 1-9: 0
1. LITTLE INTEREST OR PLEASURE IN DOING THINGS: NOT AT ALL
SUM OF ALL RESPONSES TO PHQ QUESTIONS 1-9: 0
2. FEELING DOWN, DEPRESSED OR HOPELESS: NOT AT ALL
SUM OF ALL RESPONSES TO PHQ QUESTIONS 1-9: 0
SUM OF ALL RESPONSES TO PHQ QUESTIONS 1-9: 0
SUM OF ALL RESPONSES TO PHQ9 QUESTIONS 1 & 2: 0

## 2025-02-10 RX ORDER — METOPROLOL SUCCINATE 25 MG/1
25 TABLET, EXTENDED RELEASE ORAL DAILY
Qty: 90 TABLET | Refills: 3 | Status: SHIPPED | OUTPATIENT
Start: 2025-02-10

## 2025-02-10 NOTE — TELEPHONE ENCOUNTER
Last OV: 12/11/24  Next OV: 12/15/25  Last refill: 2/23/24 #90 3 R/F  Most recent Labs: 7/24/24  Last EKG (if needed): 1/29/24

## 2025-02-12 ENCOUNTER — OFFICE VISIT (OUTPATIENT)
Dept: FAMILY MEDICINE CLINIC | Age: 65
End: 2025-02-12
Payer: COMMERCIAL

## 2025-02-12 VITALS
OXYGEN SATURATION: 98 % | HEART RATE: 72 BPM | WEIGHT: 172 LBS | HEIGHT: 64 IN | SYSTOLIC BLOOD PRESSURE: 120 MMHG | BODY MASS INDEX: 29.37 KG/M2 | DIASTOLIC BLOOD PRESSURE: 60 MMHG

## 2025-02-12 DIAGNOSIS — E66.3 OVERWEIGHT (BMI 25.0-29.9): Primary | ICD-10-CM

## 2025-02-12 DIAGNOSIS — I10 ESSENTIAL HYPERTENSION: ICD-10-CM

## 2025-02-12 DIAGNOSIS — R17 TOTAL BILIRUBIN, ELEVATED: ICD-10-CM

## 2025-02-12 PROCEDURE — 99213 OFFICE O/P EST LOW 20 MIN: CPT | Performed by: FAMILY MEDICINE

## 2025-02-12 PROCEDURE — G2211 COMPLEX E/M VISIT ADD ON: HCPCS | Performed by: FAMILY MEDICINE

## 2025-02-12 PROCEDURE — 1123F ACP DISCUSS/DSCN MKR DOCD: CPT | Performed by: FAMILY MEDICINE

## 2025-02-12 PROCEDURE — 3078F DIAST BP <80 MM HG: CPT | Performed by: FAMILY MEDICINE

## 2025-02-12 PROCEDURE — 3074F SYST BP LT 130 MM HG: CPT | Performed by: FAMILY MEDICINE

## 2025-02-12 NOTE — PROGRESS NOTES
CHRONIC CONDITION FOLLOW-UP     Assessment and Plan:      Diagnosis Orders   1. Overweight (BMI 25.0-29.9)        2. Total bilirubin, elevated- chronic, mild        3. Essential hypertension          Plan as above and below.     Continue current Tx plan. Any changes marked below.    INSTRUCTIONS  NEXT APPOINTMENT: Please schedule fasting annual physical (30 minutes) in 6 months.  OK to have water, black coffee and medications (except for diabetes medicines)     Subjective:      Chief Complaint   Patient presents with    Medication Check     6 mo f/u for weight loss injections     Sydnee Hernandez is an 64 y.o. female who presents for follow up    Complaints:   none    CHART REVIEW  There are no preventive care reminders to display for this patient.  Social History     Tobacco Use    Smoking status: Former     Current packs/day: 0.00     Average packs/day: 1 pack/day for 21.0 years (21.0 ttl pk-yrs)     Types: Cigarettes     Start date: 1978     Quit date: 1999     Years since quittin.1     Passive exposure: Past    Smokeless tobacco: Never    Tobacco comments:     started age 16   Vaping Use    Vaping status: Never Used   Substance Use Topics    Alcohol use: Yes     Alcohol/week: 1.0 standard drink of alcohol     Types: 1 Glasses of wine per week     Comment: 1 per month     Drug use: No     Comment: never     MEDS  Current Outpatient Medications   Medication Instructions    aspirin 81 mg, Oral, NIGHTLY    atorvastatin (LIPITOR) 40 MG tablet TAKE 1 TABLET NIGHTLY    CALCIUM PO 2 tablets, Oral, DAILY    metoprolol succinate (TOPROL XL) 25 mg, Oral, DAILY    omeprazole (PRILOSEC) 40 mg, Oral, DAILY BEFORE BREAKFAST    warfarin (JANTOVEN) 2.5 MG tablet TAKE 1 TABLET DAILY EXCEPT 2 TABLETS EVERY Monday and Thursday    Wegovy 2.4 mg, SubCUTAneous, EVERY 7 DAYS     LAST LABS  Lab Results   Component Value Date    CHOL 113 2024    TRIG 95 2024    HDL 47 2024    LDL 47 2024     Lab

## 2025-02-28 ENCOUNTER — ANTI-COAG VISIT (OUTPATIENT)
Dept: PHARMACY | Age: 65
End: 2025-02-28
Payer: COMMERCIAL

## 2025-02-28 DIAGNOSIS — Z95.2 S/P AVR (AORTIC VALVE REPLACEMENT): Primary | ICD-10-CM

## 2025-02-28 DIAGNOSIS — Z86.79 S/P ASCENDING AORTIC ANEURYSM REPAIR: ICD-10-CM

## 2025-02-28 DIAGNOSIS — Z98.890 S/P ASCENDING AORTIC ANEURYSM REPAIR: ICD-10-CM

## 2025-02-28 LAB
INTERNATIONAL NORMALIZATION RATIO, POC: 2.3
PROTHROMBIN TIME, POC: 0

## 2025-02-28 PROCEDURE — 99212 OFFICE O/P EST SF 10 MIN: CPT

## 2025-02-28 PROCEDURE — 85610 PROTHROMBIN TIME: CPT

## 2025-02-28 NOTE — PROGRESS NOTES
Sydnee Hernandez is a 64 y.o. here for warfarin management.  Sydnee had an INR test today. Results were reviewed and appropriate warfarin management was completed.     Patient verifies current warfarin dosing regimen: Yes     Warfarin medication reviewed and updated on the patient 's home medication list: Yes   All other medications reviewed and updated on the patient 's home medication list: No: no changes     Lab Results   Component Value Date    INR 2.3 2025    INR 2.4 2025    INR 2.7 2024     Patient Findings       Negatives:  Signs/symptoms of thrombosis, Signs/symptoms of bleeding, Change in medications, Change in diet/appetite, Bruising          Anticoagulation Summary  As of 2025      INR goal:  2.5-3.5   TTR:  62.2% (9 y)   INR used for dosin.3 (2025)   Warfarin maintenance plan:  5 mg (2.5 mg x 2) every Tue, Fri; 2.5 mg (2.5 mg x 1) all other days   Weekly warfarin total:  22.5 mg   Plan last modified:  Kristyn Anand RP (2025)   Next INR check:  3/28/2025   Priority:  Maintenance   Target end date:  Indefinite    Indications    S/P AVR (aortic valve replacement)  [Z95.2]  S/P ascending aortic aneurysm repair [Z98.890  Z86.79]                 Anticoagulation Episode Summary       INR check location:  Anticoagulation Clinic    Preferred lab:  --    Send INR reminders to:  [facility]    Comments:  EPIC... ascending aortic aneurysm repair as well as [place] mechanical AVR 2016. Med mgt consent-2023, hosp recurring concent-24          Anticoagulation Care Providers       Provider Role Specialty Phone number    Alonso Magaña MD Referring Cardiovascular Disease [phone]    Arley Martinez MD  Family Medicine [phone]          [facility] 2006     Warfarin assessment / plan:     Appears well  Sub-therapeutic INR     Denies missed doses.  Denies increased vitamin K intake.  Denies signs or symptoms of clotting.  Denies signs

## 2025-03-05 DIAGNOSIS — E66.811 OBESITY (BMI 30.0-34.9): ICD-10-CM

## 2025-03-06 RX ORDER — SEMAGLUTIDE 2.4 MG/.75ML
2.4 INJECTION, SOLUTION SUBCUTANEOUS
Qty: 3 ML | Refills: 2 | Status: SHIPPED | OUTPATIENT
Start: 2025-03-06

## 2025-03-24 ENCOUNTER — OFFICE VISIT (OUTPATIENT)
Dept: FAMILY MEDICINE CLINIC | Age: 65
End: 2025-03-24
Payer: COMMERCIAL

## 2025-03-24 VITALS
SYSTOLIC BLOOD PRESSURE: 114 MMHG | TEMPERATURE: 98.3 F | HEIGHT: 64 IN | BODY MASS INDEX: 27.83 KG/M2 | HEART RATE: 71 BPM | WEIGHT: 163 LBS | OXYGEN SATURATION: 97 % | DIASTOLIC BLOOD PRESSURE: 62 MMHG

## 2025-03-24 DIAGNOSIS — R30.0 DYSURIA: Primary | ICD-10-CM

## 2025-03-24 LAB
BILIRUBIN, POC: ABNORMAL
BLOOD URINE, POC: 5.5
CLARITY, POC: ABNORMAL
COLOR, POC: YELLOW
GLUCOSE URINE, POC: ABNORMAL MG/DL
KETONES, POC: ABNORMAL MG/DL
LEUKOCYTE EST, POC: ABNORMAL
NITRITE, POC: ABNORMAL
PH, POC: ABNORMAL
PROTEIN, POC: ABNORMAL MG/DL
SPECIFIC GRAVITY, POC: ABNORMAL
UROBILINOGEN, POC: 0.2 MG/DL

## 2025-03-24 PROCEDURE — 99213 OFFICE O/P EST LOW 20 MIN: CPT

## 2025-03-24 PROCEDURE — 3078F DIAST BP <80 MM HG: CPT

## 2025-03-24 PROCEDURE — 1123F ACP DISCUSS/DSCN MKR DOCD: CPT

## 2025-03-24 PROCEDURE — 81002 URINALYSIS NONAUTO W/O SCOPE: CPT

## 2025-03-24 PROCEDURE — 3074F SYST BP LT 130 MM HG: CPT

## 2025-03-24 RX ORDER — SULFAMETHOXAZOLE AND TRIMETHOPRIM 800; 160 MG/1; MG/1
1 TABLET ORAL 2 TIMES DAILY
Qty: 14 TABLET | Refills: 0 | Status: SHIPPED | OUTPATIENT
Start: 2025-03-24 | End: 2025-03-31

## 2025-03-24 NOTE — PROGRESS NOTES
Sydnee Hernandez (: 1960) is a 64 y.o. female is here for evaluation of the following chief complaint(s): Urinary Frequency (Has had for 2 weeks, some burning, not a lot at a time. Just a drizzle and it is cloudy.)    Assessment/Plan:     Assessment & Plan  Dysuria   Acute condition, new, POCT UA +leukocytes and blood. Follow C&S.  Treat with bactrim DS BID x 7 days.  She will contact the coumadin clinic for guidance on coumadin adjustment while on antibiotics.    Orders:    POCT Urinalysis no Micro    Culture, Urine    sulfamethoxazole-trimethoprim (BACTRIM DS) 800-160 MG per tablet; Take 1 tablet by mouth 2 times daily for 7 days    The above diagnosis is an  acute  problem.  We discussed expected course, resolution, and complications of diagnosis in detail.  I advised her to call back or return to office if symptoms worsen/change/persist.        Return if symptoms worsen or fail to improve.    Subjective/Objective:   She complains of dysuria, frequency, burning with urination, hematuria for 2 weeks.  She denies nausea, vomiting, diarrhea, constipation, pain in in the lower abdomen, in the left flank, and in the right flank.  Since starting she reports her symptoms are waxing and waning. Treatments tried: none.    Pertinent items are noted in HPI.      Physical Exam:  General: alert, cooperative, and no distress  Abdomen: soft, nontender, nondistended, no masses or organomegaly  Back: CVA tenderness absent  : exam deferred  /62 (BP Site: Left Upper Arm, Patient Position: Sitting, BP Cuff Size: Medium Adult)   Pulse 71   Temp 98.3 °F (36.8 °C) (Oral)   Ht 1.626 m (5' 4\")   Wt 73.9 kg (163 lb)   LMP 2006   SpO2 97%   BMI 27.98 kg/m²      An electronic signature was used to authenticate this note.  -- FELICITAS Garcia - CNP

## 2025-03-26 LAB
BACTERIA UR CULT: ABNORMAL
ORGANISM: ABNORMAL

## 2025-03-27 ENCOUNTER — TELEPHONE (OUTPATIENT)
Dept: PHARMACY | Age: 65
End: 2025-03-27

## 2025-03-27 NOTE — TELEPHONE ENCOUNTER
Pt was started on bactrim on 3/24 for 7 days. She called the clinic today to inform us, and to ask about a dose reduction. I called and LVM twice with the patient today to discuss, she never called us back. Will follow up on Friday 3/28.    Isabela Walter, PharmD 3/27/2025 3:39 PM   [facility]  [phone]

## 2025-03-28 ENCOUNTER — RESULTS FOLLOW-UP (OUTPATIENT)
Dept: FAMILY MEDICINE CLINIC | Age: 65
End: 2025-03-28

## 2025-03-28 ENCOUNTER — TELEPHONE (OUTPATIENT)
Dept: PHARMACY | Age: 65
End: 2025-03-28

## 2025-03-28 NOTE — TELEPHONE ENCOUNTER
I spoke to Sydnee today about her starting Bactrim DS for a UTI (started 2/26 and will be taking for 7 days). I let her know this medication does interact with warfarin and can increase the INR and therefore the risk of bleeding.  I recommended she lower her warfarin dose for the next week and take 2.5mg (1 tablet) every day through 4/1 (~20% decrease).  She is agreeable and says she will watch for any extra bleeding symptoms and call the clinic if needed.  Her next INR is scheduled for 4/11.    Katy Ocasio [facility] , PharmD  [facility]  Anticoagulation Clinic  [phone]

## 2025-04-01 DIAGNOSIS — E66.811 OBESITY (BMI 30.0-34.9): ICD-10-CM

## 2025-04-01 NOTE — TELEPHONE ENCOUNTER
Patient comment: Would you please call in a refill of this prescription to [facility]? Same dose. Thank you

## 2025-04-02 RX ORDER — SEMAGLUTIDE 2.4 MG/.75ML
2.4 INJECTION, SOLUTION SUBCUTANEOUS
Qty: 3 ML | Refills: 2 | Status: SHIPPED | OUTPATIENT
Start: 2025-04-02

## 2025-04-11 ENCOUNTER — ANTI-COAG VISIT (OUTPATIENT)
Dept: PHARMACY | Age: 65
End: 2025-04-11
Payer: COMMERCIAL

## 2025-04-11 DIAGNOSIS — Z86.79 S/P ASCENDING AORTIC ANEURYSM REPAIR: ICD-10-CM

## 2025-04-11 DIAGNOSIS — Z98.890 S/P ASCENDING AORTIC ANEURYSM REPAIR: ICD-10-CM

## 2025-04-11 DIAGNOSIS — Z95.2 S/P AVR (AORTIC VALVE REPLACEMENT): Primary | ICD-10-CM

## 2025-04-11 LAB
INTERNATIONAL NORMALIZATION RATIO, POC: 3.1
PROTHROMBIN TIME, POC: 0

## 2025-04-11 PROCEDURE — 99211 OFF/OP EST MAY X REQ PHY/QHP: CPT

## 2025-04-11 PROCEDURE — 85610 PROTHROMBIN TIME: CPT

## 2025-04-11 NOTE — PROGRESS NOTES
Encounter   Procedures    POCT INR     This order was created through the anticoagulation tracking navigator section.      No orders of the defined types were placed in this encounter.     Reviewed AVS with patient / caregiver.    Billing Points:  BASIC ASSESSMENT UNCOMPLICATED (including but not limited to: vital signs; physical assessment screening; review of secondary markers like lab results, allergies, home readings; instructions on treatment plan and basic education; assessment of medication list with one med change and compliance) - 2 points     For Pharmacy Admin Tracking Only    Intervention Detail: Adherence Monitorin  Total # of Interventions Recommended: 0  Total # of Interventions Accepted: 0  Time Spent (min): 10

## 2025-04-25 ENCOUNTER — OFFICE VISIT (OUTPATIENT)
Dept: FAMILY MEDICINE CLINIC | Age: 65
End: 2025-04-25

## 2025-04-25 VITALS
HEART RATE: 74 BPM | WEIGHT: 160 LBS | OXYGEN SATURATION: 98 % | SYSTOLIC BLOOD PRESSURE: 134 MMHG | DIASTOLIC BLOOD PRESSURE: 76 MMHG | RESPIRATION RATE: 14 BRPM | BODY MASS INDEX: 27.46 KG/M2

## 2025-04-25 DIAGNOSIS — R31.9 HEMATURIA, UNSPECIFIED TYPE: ICD-10-CM

## 2025-04-25 DIAGNOSIS — N30.01 ACUTE CYSTITIS WITH HEMATURIA: Primary | ICD-10-CM

## 2025-04-25 DIAGNOSIS — R30.0 DYSURIA: ICD-10-CM

## 2025-04-25 LAB
BILIRUBIN, POC: ABNORMAL
BLOOD URINE, POC: ABNORMAL
CLARITY, POC: ABNORMAL
COLOR, POC: ABNORMAL
GLUCOSE URINE, POC: ABNORMAL MG/DL
KETONES, POC: ABNORMAL MG/DL
LEUKOCYTE EST, POC: ABNORMAL
NITRITE, POC: NEGATIVE
PH, POC: 5.5
PROTEIN, POC: ABNORMAL MG/DL
SPECIFIC GRAVITY, POC: 1.02
UROBILINOGEN, POC: ABNORMAL MG/DL

## 2025-04-25 RX ORDER — AMOXICILLIN 500 MG/1
500 CAPSULE ORAL 3 TIMES DAILY
Qty: 30 CAPSULE | Refills: 0 | Status: SHIPPED | OUTPATIENT
Start: 2025-04-25 | End: 2025-05-05

## 2025-04-25 NOTE — PATIENT INSTRUCTIONS
Fill Rx for antibiotics if symptoms return.   Take pyridium Rx or Azo OTC for discomfort.  Our office will call with urine culture results.  Call If not better in  3 days.

## 2025-04-25 NOTE — PROGRESS NOTES
URINARY SYMPTOMS  Subjective:     Chief Complaint   Patient presents with    Urinary Tract Infection     Urinary urgency, pressure, burning for one day      Sydnee Hernandez is a 64 y.o. female who complains of dysuria, frequency, urgency.  She has had symptoms for 1 day. Patient denies fever, backpain, and diarrhea, no vaginal discharge. Patient does have a history of recurrent UTI.     Feels fine this AM.    CHART REVIEW  Health Maintenance   Topic Date Due    Polio vaccine (2 of 3 - Adult catch-up series) 02/07/2028 (Originally 10/1/2009)    COVID-19 Vaccine (6 - 2024-25 season) 05/10/2025    DTaP/Tdap/Td vaccine (3 - Td or Tdap) 07/03/2025    Lipids  07/24/2025    Breast cancer screen  11/15/2025    Depression Screen  02/09/2026    Colorectal Cancer Screen  10/21/2026    DEXA (modify frequency per FRAX score)  Completed    Flu vaccine  Completed    Shingles vaccine  Completed    Pneumococcal 50+ years Vaccine  Completed    Respiratory Syncytial Virus (RSV) Pregnant or age 60 yrs+  Completed    Hepatitis C screen  Completed    Hepatitis A vaccine  Aged Out    Hepatitis B vaccine  Aged Out    Hib vaccine  Aged Out    Meningococcal (ACWY) vaccine  Aged Out    Meningococcal B vaccine  Aged Out    Pneumococcal 0-49 years Vaccine  Discontinued    HIV screen  Discontinued    Cervical cancer screen  Discontinued     Prior to Visit Medications    Medication Sig Taking? Authorizing Provider   amoxicillin (AMOXIL) 500 MG capsule Take 1 capsule by mouth 3 times daily for 10 days Yes Arley Martinez MD   Semaglutide-Weight Management (WEGOVY) 2.4 MG/0.75ML SOAJ SC injection Inject 2.4 mg into the skin every 7 days Yes Arley Martinez MD   metoprolol succinate (TOPROL XL) 25 MG extended release tablet TAKE 1 TABLET DAILY Yes Alonso Magaña MD   atorvastatin (LIPITOR) 40 MG tablet TAKE 1 TABLET NIGHTLY Yes Arley Martinez MD   omeprazole (PRILOSEC) 40 MG delayed release capsule Take 1 capsule by mouth every morning (before

## 2025-04-27 LAB
BACTERIA UR CULT: ABNORMAL
ORGANISM: ABNORMAL

## 2025-04-28 ENCOUNTER — RESULTS FOLLOW-UP (OUTPATIENT)
Dept: FAMILY MEDICINE CLINIC | Age: 65
End: 2025-04-28

## 2025-04-30 DIAGNOSIS — E66.811 OBESITY (BMI 30.0-34.9): ICD-10-CM

## 2025-04-30 RX ORDER — SEMAGLUTIDE 2.4 MG/.75ML
2.4 INJECTION, SOLUTION SUBCUTANEOUS
Qty: 3 ML | Refills: 2 | Status: SHIPPED | OUTPATIENT
Start: 2025-04-30

## 2025-05-16 RX ORDER — AMOXICILLIN 500 MG/1
CAPSULE ORAL
Qty: 4 CAPSULE | Refills: 0 | Status: SHIPPED | OUTPATIENT
Start: 2025-05-16

## 2025-05-23 ENCOUNTER — ANTI-COAG VISIT (OUTPATIENT)
Dept: PHARMACY | Age: 65
End: 2025-05-23
Payer: COMMERCIAL

## 2025-05-23 DIAGNOSIS — Z86.79 S/P ASCENDING AORTIC ANEURYSM REPAIR: ICD-10-CM

## 2025-05-23 DIAGNOSIS — Z98.890 S/P ASCENDING AORTIC ANEURYSM REPAIR: ICD-10-CM

## 2025-05-23 DIAGNOSIS — Z95.2 S/P AVR (AORTIC VALVE REPLACEMENT): Primary | ICD-10-CM

## 2025-05-23 LAB
INTERNATIONAL NORMALIZATION RATIO, POC: 2.7
PROTHROMBIN TIME, POC: 0

## 2025-05-23 PROCEDURE — 85610 PROTHROMBIN TIME: CPT

## 2025-05-23 PROCEDURE — 99211 OFF/OP EST MAY X REQ PHY/QHP: CPT

## 2025-05-23 NOTE — PROGRESS NOTES
Sydnee Hernandez is a 64 y.o. here for warfarin management.  Sydnee had an INR test today. Results were reviewed and appropriate warfarin management was completed.     Patient verifies current warfarin dosing regimen: Yes     Warfarin medication reviewed and updated on the patient 's home medication list: Yes   All other medications reviewed and updated on the patient 's home medication list: Yes     Lab Results   Component Value Date    INR 2.7 2025    INR 3.1 2025    INR 2.3 2025       Anticoagulation Summary  As of 2025      INR goal:  2.5-3.5   TTR:  62.8% (9.2 y)   INR used for dosin.7 (2025)   Warfarin maintenance plan:  5 mg (2.5 mg x 2) every Tue, Fri; 2.5 mg (2.5 mg x 1) all other days   Weekly warfarin total:  22.5 mg   Plan last modified:  Kristyn Anand RPH (2025)   Next INR check:  7/3/2025   Priority:  Maintenance   Target end date:  Indefinite    Indications    S/P AVR (aortic valve replacement)  [Z95.2]  S/P ascending aortic aneurysm repair [Z98.890  Z86.79]                 Anticoagulation Episode Summary       INR check location:  Anticoagulation Clinic    Preferred lab:  --    Send INR reminders to:  [facility]    Comments:  EPIC... ascending aortic aneurysm repair as well as [place] mechanical AVR 2016. Med mgt consent-2023, hosp recurring concent-24          Anticoagulation Care Providers       Provider Role Specialty Phone number    Alonso Magaña MD Referring Cardiovascular Disease [phone]    Arley Martinez MD  Family Medicine [phone]          [facility] 2006     Warfarin assessment / plan:     Patient appears well today.      No changes affecting warfarin therapy were noted.   No acute findings with regards to warfarin therapy.  INR today is within goal range.     Instructed to continue the same weekly warfarin dose.    See in 6 weeks    Description    Warfarin 2.5 mg (one tablet) daily EXCEPT 5 mg (two

## 2025-06-05 RX ORDER — WARFARIN SODIUM 2.5 MG/1
TABLET ORAL
Qty: 117 TABLET | Refills: 3 | Status: SHIPPED | OUTPATIENT
Start: 2025-06-05

## 2025-06-05 NOTE — TELEPHONE ENCOUNTER
Requested Prescriptions     Pending Prescriptions Disp Refills    warfarin (SAMTOVEN) 2.5 MG tablet [Pharmacy Med Name: SAMTOVEN TAB 2.5MG] 117 tablet 3     Sig: TAKE 1 TABLET DAILY EXCEPT 2 TABLETS EVERY MONDAY AND THURSAY        Last OV: 12/11/2024  Next OV: 12/15/2025  Last refill:6/17/2024

## 2025-06-25 RX ORDER — ATORVASTATIN CALCIUM 40 MG/1
40 TABLET, FILM COATED ORAL NIGHTLY
Qty: 90 TABLET | Refills: 1 | Status: SHIPPED | OUTPATIENT
Start: 2025-06-25

## 2025-07-03 ENCOUNTER — ANTI-COAG VISIT (OUTPATIENT)
Dept: PHARMACY | Age: 65
End: 2025-07-03
Payer: COMMERCIAL

## 2025-07-03 DIAGNOSIS — Z98.890 S/P ASCENDING AORTIC ANEURYSM REPAIR: ICD-10-CM

## 2025-07-03 DIAGNOSIS — Z95.2 S/P AVR (AORTIC VALVE REPLACEMENT): Primary | ICD-10-CM

## 2025-07-03 DIAGNOSIS — Z86.79 S/P ASCENDING AORTIC ANEURYSM REPAIR: ICD-10-CM

## 2025-07-03 LAB
INTERNATIONAL NORMALIZATION RATIO, POC: 2.2
PROTHROMBIN TIME, POC: 0

## 2025-07-03 PROCEDURE — 99211 OFF/OP EST MAY X REQ PHY/QHP: CPT

## 2025-07-03 PROCEDURE — 85610 PROTHROMBIN TIME: CPT

## 2025-07-03 NOTE — PROGRESS NOTES
Sydnee Hernandez is a 65 y.o. here for warfarin management.  Sydnee had an INR test today. Results were reviewed and appropriate warfarin management was completed.     Patient verifies current warfarin dosing regimen: Yes     Warfarin medication reviewed and updated on the patient 's home medication list: Yes   All other medications reviewed and updated on the patient 's home medication list: Yes     Lab Results   Component Value Date    INR 2.2 2025    INR 2.7 2025    INR 3.1 2025     Patient Findings       Negatives:  Signs/symptoms of thrombosis, Signs/symptoms of bleeding, Change in health, Missed doses, Change in medications, Change in diet/appetite, Bruising          Anticoagulation Summary  As of 7/3/2025      INR goal:  2.5-3.5   TTR:  62.6% (9.3 y)   INR used for dosin.2 (7/3/2025)   Warfarin maintenance plan:  5 mg (2.5 mg x 2) every Tue, Fri; 2.5 mg (2.5 mg x 1) all other days   Weekly warfarin total:  22.5 mg   Plan last modified:  Kristyn Anand [facility] (2025)   Next INR check:  8/15/2025   Priority:  Maintenance   Target end date:  Indefinite    Indications    S/P AVR (aortic valve replacement)  [Z95.2]  S/P ascending aortic aneurysm repair [Z98.890  Z86.79]                 Anticoagulation Episode Summary       INR check location:  Anticoagulation Clinic    Preferred lab:  --    Send INR reminders to:  [facility]    Comments:  EPIC... ascending aortic aneurysm repair as well as [place] mechanical AVR 2016. Med mgt consent-2023, hosp recurring concent-24          Anticoagulation Care Providers       Provider Role Specialty Phone number    Alonso Magaña MD Referring Cardiovascular Disease [phone]    Arley Martinez MD  Family Medicine [phone]          [facility] 2006     Warfarin assessment / plan:     Appears well  Sub-therapeutic INR     Denies missed doses.  Denies medication changes.  Denies signs or symptoms of

## 2025-07-21 DIAGNOSIS — K21.9 GASTROESOPHAGEAL REFLUX DISEASE WITHOUT ESOPHAGITIS: ICD-10-CM

## 2025-07-21 RX ORDER — OMEPRAZOLE 40 MG/1
40 CAPSULE, DELAYED RELEASE ORAL
Qty: 90 CAPSULE | Refills: 3 | Status: SHIPPED | OUTPATIENT
Start: 2025-07-21

## 2025-07-22 DIAGNOSIS — E66.811 OBESITY (BMI 30.0-34.9): ICD-10-CM

## 2025-07-22 RX ORDER — SEMAGLUTIDE 2.4 MG/.75ML
2.4 INJECTION, SOLUTION SUBCUTANEOUS
Qty: 3 ML | Refills: 2 | Status: SHIPPED | OUTPATIENT
Start: 2025-07-22

## 2025-08-15 ENCOUNTER — ANTI-COAG VISIT (OUTPATIENT)
Dept: PHARMACY | Age: 65
End: 2025-08-15
Payer: COMMERCIAL

## 2025-08-15 DIAGNOSIS — Z86.79 S/P ASCENDING AORTIC ANEURYSM REPAIR: ICD-10-CM

## 2025-08-15 DIAGNOSIS — Z98.890 S/P ASCENDING AORTIC ANEURYSM REPAIR: ICD-10-CM

## 2025-08-15 DIAGNOSIS — Z95.2 S/P AVR (AORTIC VALVE REPLACEMENT): Primary | ICD-10-CM

## 2025-08-15 LAB
INR BLD: 2.3
PROTIME: NORMAL

## 2025-08-15 PROCEDURE — 85610 PROTHROMBIN TIME: CPT

## 2025-08-15 PROCEDURE — 99212 OFFICE O/P EST SF 10 MIN: CPT

## 2025-09-03 ENCOUNTER — OFFICE VISIT (OUTPATIENT)
Dept: FAMILY MEDICINE CLINIC | Age: 65
End: 2025-09-03
Payer: COMMERCIAL

## 2025-09-03 VITALS
SYSTOLIC BLOOD PRESSURE: 122 MMHG | RESPIRATION RATE: 16 BRPM | WEIGHT: 150 LBS | HEART RATE: 72 BPM | DIASTOLIC BLOOD PRESSURE: 74 MMHG | OXYGEN SATURATION: 93 % | BODY MASS INDEX: 25.61 KG/M2 | HEIGHT: 64 IN

## 2025-09-03 DIAGNOSIS — R31.9 HEMATURIA, UNSPECIFIED TYPE: ICD-10-CM

## 2025-09-03 DIAGNOSIS — I10 ESSENTIAL HYPERTENSION: Primary | ICD-10-CM

## 2025-09-03 DIAGNOSIS — Z23 NEED FOR TETANUS BOOSTER: ICD-10-CM

## 2025-09-03 DIAGNOSIS — I69.30 HISTORY OF CVA WITH RESIDUAL DEFICIT: ICD-10-CM

## 2025-09-03 DIAGNOSIS — E78.2 MIXED HYPERLIPIDEMIA: ICD-10-CM

## 2025-09-03 DIAGNOSIS — Z95.2 S/P AVR (AORTIC VALVE REPLACEMENT): ICD-10-CM

## 2025-09-03 DIAGNOSIS — Z79.01 CHRONIC ANTICOAGULATION: ICD-10-CM

## 2025-09-03 DIAGNOSIS — E66.3 OVERWEIGHT (BMI 25.0-29.9): ICD-10-CM

## 2025-09-03 PROBLEM — R31.29 PERSISTENT MICROSCOPIC HEMATURIA: Status: ACTIVE | Noted: 2025-04-25

## 2025-09-03 LAB
BILIRUBIN, POC: NORMAL
BLOOD URINE, POC: NORMAL
CLARITY, POC: CLEAR
COLOR, POC: YELLOW
GLUCOSE URINE, POC: NORMAL MG/DL
KETONES, POC: NORMAL MG/DL
LEUKOCYTE EST, POC: NORMAL
NITRITE, POC: NORMAL
PH, POC: 5.5
PROTEIN, POC: NORMAL MG/DL
SPECIFIC GRAVITY, POC: >=1.03
UROBILINOGEN, POC: 0.2 MG/DL

## 2025-09-03 PROCEDURE — 90471 IMMUNIZATION ADMIN: CPT | Performed by: FAMILY MEDICINE

## 2025-09-03 PROCEDURE — 3074F SYST BP LT 130 MM HG: CPT | Performed by: FAMILY MEDICINE

## 2025-09-03 PROCEDURE — 99213 OFFICE O/P EST LOW 20 MIN: CPT | Performed by: FAMILY MEDICINE

## 2025-09-03 PROCEDURE — 81002 URINALYSIS NONAUTO W/O SCOPE: CPT | Performed by: FAMILY MEDICINE

## 2025-09-03 PROCEDURE — 1123F ACP DISCUSS/DSCN MKR DOCD: CPT | Performed by: FAMILY MEDICINE

## 2025-09-03 PROCEDURE — 90715 TDAP VACCINE 7 YRS/> IM: CPT | Performed by: FAMILY MEDICINE

## 2025-09-03 PROCEDURE — 3078F DIAST BP <80 MM HG: CPT | Performed by: FAMILY MEDICINE

## 2025-09-04 LAB — BACTERIA UR CULT: NORMAL
